# Patient Record
Sex: FEMALE | Race: WHITE | NOT HISPANIC OR LATINO | Employment: OTHER | URBAN - METROPOLITAN AREA
[De-identification: names, ages, dates, MRNs, and addresses within clinical notes are randomized per-mention and may not be internally consistent; named-entity substitution may affect disease eponyms.]

---

## 2019-03-29 ENCOUNTER — APPOINTMENT (OUTPATIENT)
Dept: RADIOLOGY | Facility: CLINIC | Age: 64
End: 2019-03-29
Payer: MEDICARE

## 2019-03-29 ENCOUNTER — OFFICE VISIT (OUTPATIENT)
Dept: URGENT CARE | Facility: CLINIC | Age: 64
End: 2019-03-29
Payer: MEDICARE

## 2019-03-29 VITALS
RESPIRATION RATE: 20 BRPM | HEART RATE: 72 BPM | DIASTOLIC BLOOD PRESSURE: 76 MMHG | OXYGEN SATURATION: 97 % | TEMPERATURE: 98.2 F | SYSTOLIC BLOOD PRESSURE: 128 MMHG

## 2019-03-29 DIAGNOSIS — M72.2 PLANTAR FASCIITIS, RIGHT: Primary | ICD-10-CM

## 2019-03-29 DIAGNOSIS — M79.671 RIGHT FOOT PAIN: ICD-10-CM

## 2019-03-29 PROCEDURE — 99203 OFFICE O/P NEW LOW 30 MIN: CPT | Performed by: NURSE PRACTITIONER

## 2019-03-29 PROCEDURE — G0463 HOSPITAL OUTPT CLINIC VISIT: HCPCS | Performed by: NURSE PRACTITIONER

## 2019-03-29 PROCEDURE — 73630 X-RAY EXAM OF FOOT: CPT

## 2019-03-29 RX ORDER — NAPROXEN 500 MG/1
250 TABLET ORAL 2 TIMES DAILY WITH MEALS
Qty: 30 TABLET | Refills: 0 | Status: SHIPPED | OUTPATIENT
Start: 2019-03-29 | End: 2019-12-03

## 2019-03-29 RX ORDER — PRAVASTATIN SODIUM 10 MG
10 TABLET ORAL DAILY
COMMUNITY
End: 2019-12-03

## 2019-03-29 RX ORDER — LEVOTHYROXINE SODIUM 0.07 MG/1
75 TABLET ORAL DAILY
COMMUNITY
End: 2019-12-03

## 2019-03-29 RX ORDER — IRBESARTAN AND HYDROCHLOROTHIAZIDE 150; 12.5 MG/1; MG/1
1 TABLET, FILM COATED ORAL DAILY
COMMUNITY
End: 2019-12-03

## 2019-03-29 NOTE — PROGRESS NOTES
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 since she has access to care there  Advised her that she will need to follow up with Podiatry for orthopedic specialist if symptoms persist     Follow up with PCP in 3-5 days  Proceed to  ER if symptoms worsen  Chief Complaint     Chief Complaint   Patient presents with    Foot Pain     right foot pain on bottom of foot for 2 days after walking         History of Present Illness       68-year-old female with a chief complaint of pain on the plantar aspect of the right foot  Four days ago she was walking [place] and felt a hole on the bottom of her right foot it has radiated to the right heel and ankle at times  It seemed to be improving until this morning  She took a step and felt a pull on the bottom of the foot again  Current pain level is a 5/10 on a 0-10 scale  She denies numbness tingling or weakness in her right lower extremity  Review of Systems   Review of Systems   Constitutional: Negative  HENT: Negative  Eyes: Negative  Respiratory: Negative  Cardiovascular: Negative  Gastrointestinal: Negative  Endocrine: Negative  Genitourinary: Negative  Musculoskeletal: Positive for arthralgias (Right foot pain)  Neurological: Negative  Psychiatric/Behavioral: Negative            Current Medications       Current Outpatient Medications:     irbesartan-hydrochlorothiazide (AVALIDE) 150-12 5 MG per tablet, Take 1 tablet by mouth daily, Disp: , Rfl:     levothyroxine 75 mcg tablet, Take 75 mcg by mouth daily, Disp: , Rfl:     pravastatin (PRAVACHOL) 10 mg tablet, Take 10 mg by mouth daily, Disp: , Rfl:     naproxen (NAPROSYN) 500 mg tablet, Take 0 5 tablets (250 mg total) by mouth 2 (two) times a day with meals for 15 days, Disp: 30 tablet, Rfl: 0    Current Allergies     Allergies as of 03/29/2019 - Reviewed 03/29/2019   Allergen Reaction Noted    Penicillins  03/29/2019            The following portions of the patient's history were reviewed and updated as appropriate: allergies, current medications, past family history, past medical history, past social history, past surgical history and problem list      Past Medical History:   Diagnosis Date    Disease of thyroid gland        History reviewed  No pertinent surgical history  History reviewed  No pertinent family history  Medications have been verified  Objective   /76   Pulse 72   Temp 98 2 °F (36 8 °C) (Tympanic)   Resp 20   SpO2 97%        Physical Exam     Physical Exam   Constitutional: She appears well-developed and well-nourished  No distress  HENT:   Head: Normocephalic and atraumatic  Right Ear: External ear normal    Left Ear: External ear normal    Mouth/Throat: Oropharynx is clear and moist    Eyes: Pupils are equal, round, and reactive to light  Conjunctivae and EOM are normal    Neck: Normal range of motion  Neck supple  Cardiovascular: Normal rate, regular rhythm and normal heart sounds  Pulmonary/Chest: Effort normal and breath sounds normal  No stridor  No respiratory distress  She has no wheezes  Abdominal: Soft  Bowel sounds are normal    Musculoskeletal:        Feet:    Lymphadenopathy:     She has no cervical adenopathy  Skin: Skin is warm and dry  She is not diaphoretic  Psychiatric: She has a normal mood and affect   Her behavior is normal  Judgment and thought content normal  Nursing note and vitals reviewed

## 2019-03-29 NOTE — PATIENT INSTRUCTIONS
Plantar Fasciitis   WHAT YOU NEED TO KNOW:   Rest, ice, and foot supports can help your plantar fascia heal  You may need medicines to decrease swelling and pain  A physical therapist can teach you exercises to help improve movement and strength, and to decrease pain  DISCHARGE INSTRUCTIONS:   Contact your healthcare provider or podiatrist if:   · Your pain and swelling increase  · You develop knee, hip, or back pain  · You have questions or concerns about your condition or care  Medicines: You may  need any of the following:  · Acetaminophen  decreases pain and fever  It is available without a doctor's order  Ask how much to give your child and how often to give it  Follow directions  Read the labels of all other medicines your child uses to see if they also contain acetaminophen, or ask your child's doctor or pharmacist  Acetaminophen can cause liver damage if not taken correctly  · NSAIDs , such as ibuprofen, help decrease swelling, pain, and fever  NSAIDs can cause stomach bleeding or kidney problems in certain people  If you take blood thinner medicine, always ask your healthcare provider if NSAIDs are safe for you  Always read the medicine label and follow directions  · Take your medicine as directed  Contact your healthcare provider if you think your medicine is not helping or if you have side effects  Tell him of her if you are allergic to any medicine  Keep a list of the medicines, vitamins, and herbs you take  Include the amounts, and when and why you take them  Bring the list or the pill bottles to follow-up visits  Carry your medicine list with you in case of an emergency  Self-care:   · Wear your splint or shoe inserts as directed  You may need to wear a splint at night to keep your foot stretched while you sleep  This will help prevent sharp pain first thing in the morning  Shoe inserts will help decrease stress on your plantar fascia when you walk or exercise       · Rest as directed  Rest as much as possible to decrease swelling and prevent more damage  Ask your healthcare provider when you can return to your normal activities  · Apply ice on your plantar fascia  Ice helps prevent tissue damage and decreases swelling and pain  Fill a water bottle with water and freeze it  Wrap a towel around the bottle or cover it with a pillow case  Roll the water bottle under your foot for 10 minutes in the morning and after work  · Massage your plantar fascia as directed  This may help decrease swelling and pain  Roll a golf ball under your foot for 10 minutes  Repeat 3 times each day  · Go to physical therapy as directed  A physical therapist teaches you exercises to help improve movement and strength, and to decrease pain  Prevent plantar fasciitis:   · Maintain a healthy weight  This will help decrease stress on your feet  Ask your healthcare provider how much you should weigh  Ask him to help you create a weight loss plan if you are overweight  · Do low-impact exercises  Low-impact exercises decrease stress on your plantar fascia  Examples include swimming or bicycling  · Start new activities slowly  Increase the intensity and time gradually  · Wear shoes that fit well and support your arch  Replace your shoes before the padding or shock absorption wears out  Do not walk or  bare feet or sandals for long periods of time  · Stretch before you exercise  Ask your healthcare provider how to stretch your plantar fascia and calf muscles  Follow up with your healthcare provider or podiatrist as directed:  Write down your questions so you remember to ask them during your visits  © 2017 [employer] Information is for End User's use only and may not be sold, redistributed or otherwise used for commercial purposes  All illustrations and images included in CareNotes® are the copyrighted property of [employer]  or [employer]    The above information is an  only  It is not intended as medical advice for individual conditions or treatments  Talk to your doctor, nurse or pharmacist before following any medical regimen to see if it is safe and effective for you

## 2019-12-03 ENCOUNTER — OFFICE VISIT (OUTPATIENT)
Dept: INTERNAL MEDICINE CLINIC | Facility: CLINIC | Age: 64
End: 2019-12-03
Payer: MEDICARE

## 2019-12-03 ENCOUNTER — APPOINTMENT (OUTPATIENT)
Dept: LAB | Facility: CLINIC | Age: 64
End: 2019-12-03
Payer: MEDICARE

## 2019-12-03 VITALS
HEART RATE: 78 BPM | SYSTOLIC BLOOD PRESSURE: 126 MMHG | RESPIRATION RATE: 14 BRPM | TEMPERATURE: 98.2 F | DIASTOLIC BLOOD PRESSURE: 74 MMHG | HEIGHT: 64 IN | WEIGHT: 237 LBS | BODY MASS INDEX: 40.46 KG/M2

## 2019-12-03 DIAGNOSIS — Z13.29 SCREENING FOR HYPOTHYROIDISM: ICD-10-CM

## 2019-12-03 DIAGNOSIS — Z13.1 SCREENING FOR DIABETES MELLITUS: ICD-10-CM

## 2019-12-03 DIAGNOSIS — Z78.0 MENOPAUSE: ICD-10-CM

## 2019-12-03 DIAGNOSIS — E78.5 HYPERLIPIDEMIA, UNSPECIFIED HYPERLIPIDEMIA TYPE: ICD-10-CM

## 2019-12-03 DIAGNOSIS — Z12.11 SCREENING FOR COLON CANCER: ICD-10-CM

## 2019-12-03 DIAGNOSIS — Z13.220 SCREENING FOR HYPERLIPIDEMIA: ICD-10-CM

## 2019-12-03 DIAGNOSIS — Z13.0 SCREENING FOR DEFICIENCY ANEMIA: ICD-10-CM

## 2019-12-03 DIAGNOSIS — Z12.39 SCREENING FOR BREAST CANCER: ICD-10-CM

## 2019-12-03 DIAGNOSIS — E03.9 ACQUIRED HYPOTHYROIDISM: ICD-10-CM

## 2019-12-03 DIAGNOSIS — Z13.21 ENCOUNTER FOR VITAMIN DEFICIENCY SCREENING: ICD-10-CM

## 2019-12-03 DIAGNOSIS — I10 ESSENTIAL HYPERTENSION: Primary | ICD-10-CM

## 2019-12-03 LAB
25(OH)D3 SERPL-MCNC: 19.6 NG/ML (ref 30–100)
ALBUMIN SERPL BCP-MCNC: 4.2 G/DL (ref 3.5–5)
ALP SERPL-CCNC: 81 U/L (ref 46–116)
ALT SERPL W P-5'-P-CCNC: 24 U/L (ref 12–78)
ANION GAP SERPL CALCULATED.3IONS-SCNC: 5 MMOL/L (ref 4–13)
AST SERPL W P-5'-P-CCNC: 10 U/L (ref 5–45)
BASOPHILS # BLD AUTO: 0.04 THOUSANDS/ΜL (ref 0–0.1)
BASOPHILS NFR BLD AUTO: 1 % (ref 0–1)
BILIRUB SERPL-MCNC: 0.58 MG/DL (ref 0.2–1)
BUN SERPL-MCNC: 12 MG/DL (ref 5–25)
CALCIUM SERPL-MCNC: 9.4 MG/DL (ref 8.3–10.1)
CHLORIDE SERPL-SCNC: 107 MMOL/L (ref 100–108)
CHOLEST SERPL-MCNC: 202 MG/DL (ref 50–200)
CO2 SERPL-SCNC: 29 MMOL/L (ref 21–32)
CREAT SERPL-MCNC: 0.61 MG/DL (ref 0.6–1.3)
EOSINOPHIL # BLD AUTO: 0.12 THOUSAND/ΜL (ref 0–0.61)
EOSINOPHIL NFR BLD AUTO: 2 % (ref 0–6)
ERYTHROCYTE [DISTWIDTH] IN BLOOD BY AUTOMATED COUNT: 13 % (ref 11.6–15.1)
GFR SERPL CREATININE-BSD FRML MDRD: 95 ML/MIN/1.73SQ M
GLUCOSE P FAST SERPL-MCNC: 112 MG/DL (ref 65–99)
HCT VFR BLD AUTO: 44.1 % (ref 34.8–46.1)
HDLC SERPL-MCNC: 56 MG/DL
HGB BLD-MCNC: 14 G/DL (ref 11.5–15.4)
IMM GRANULOCYTES # BLD AUTO: 0.01 THOUSAND/UL (ref 0–0.2)
IMM GRANULOCYTES NFR BLD AUTO: 0 % (ref 0–2)
LDLC SERPL CALC-MCNC: 109 MG/DL (ref 0–100)
LYMPHOCYTES # BLD AUTO: 1.98 THOUSANDS/ΜL (ref 0.6–4.47)
LYMPHOCYTES NFR BLD AUTO: 30 % (ref 14–44)
MCH RBC QN AUTO: 30.8 PG (ref 26.8–34.3)
MCHC RBC AUTO-ENTMCNC: 31.7 G/DL (ref 31.4–37.4)
MCV RBC AUTO: 97 FL (ref 82–98)
MONOCYTES # BLD AUTO: 0.64 THOUSAND/ΜL (ref 0.17–1.22)
MONOCYTES NFR BLD AUTO: 10 % (ref 4–12)
NEUTROPHILS # BLD AUTO: 3.93 THOUSANDS/ΜL (ref 1.85–7.62)
NEUTS SEG NFR BLD AUTO: 57 % (ref 43–75)
NRBC BLD AUTO-RTO: 0 /100 WBCS
PLATELET # BLD AUTO: 253 THOUSANDS/UL (ref 149–390)
PMV BLD AUTO: 9.7 FL (ref 8.9–12.7)
POTASSIUM SERPL-SCNC: 4 MMOL/L (ref 3.5–5.3)
PROT SERPL-MCNC: 7.1 G/DL (ref 6.4–8.2)
RBC # BLD AUTO: 4.54 MILLION/UL (ref 3.81–5.12)
SODIUM SERPL-SCNC: 141 MMOL/L (ref 136–145)
TRIGL SERPL-MCNC: 184 MG/DL
TSH SERPL DL<=0.05 MIU/L-ACNC: 2.88 UIU/ML (ref 0.36–3.74)
WBC # BLD AUTO: 6.72 THOUSAND/UL (ref 4.31–10.16)

## 2019-12-03 PROCEDURE — 99203 OFFICE O/P NEW LOW 30 MIN: CPT | Performed by: PHYSICIAN ASSISTANT

## 2019-12-03 PROCEDURE — 82306 VITAMIN D 25 HYDROXY: CPT

## 2019-12-03 PROCEDURE — 80053 COMPREHEN METABOLIC PANEL: CPT

## 2019-12-03 PROCEDURE — 84443 ASSAY THYROID STIM HORMONE: CPT

## 2019-12-03 PROCEDURE — 36415 COLL VENOUS BLD VENIPUNCTURE: CPT

## 2019-12-03 PROCEDURE — 80061 LIPID PANEL: CPT

## 2019-12-03 PROCEDURE — 85025 COMPLETE CBC W/AUTO DIFF WBC: CPT

## 2019-12-03 RX ORDER — MULTIVITAMIN
1 TABLET ORAL DAILY
COMMUNITY

## 2019-12-03 RX ORDER — LEVOTHYROXINE SODIUM 88 UG/1
TABLET ORAL
Refills: 0 | COMMUNITY
Start: 2019-09-01 | End: 2019-12-03 | Stop reason: SDUPTHER

## 2019-12-03 RX ORDER — PRAVASTATIN SODIUM 20 MG
20 TABLET ORAL DAILY
Qty: 30 TABLET | Refills: 2 | Status: SHIPPED | OUTPATIENT
Start: 2019-12-03 | End: 2019-12-30 | Stop reason: SDUPTHER

## 2019-12-03 RX ORDER — LEVOTHYROXINE SODIUM 88 UG/1
88 TABLET ORAL DAILY
Qty: 30 TABLET | Refills: 5 | Status: SHIPPED | OUTPATIENT
Start: 2019-12-03 | End: 2020-01-03 | Stop reason: SDUPTHER

## 2019-12-03 RX ORDER — PRAVASTATIN SODIUM 20 MG
TABLET ORAL
Refills: 0 | COMMUNITY
Start: 2019-09-01 | End: 2019-12-03 | Stop reason: SDUPTHER

## 2019-12-03 RX ORDER — LORATADINE 10 MG/1
10 TABLET ORAL DAILY
COMMUNITY
End: 2021-11-23

## 2019-12-03 RX ORDER — LOSARTAN POTASSIUM AND HYDROCHLOROTHIAZIDE 12.5; 1 MG/1; MG/1
1 TABLET ORAL DAILY
Qty: 30 TABLET | Refills: 5 | Status: SHIPPED | OUTPATIENT
Start: 2019-12-03 | End: 2020-01-03 | Stop reason: SDUPTHER

## 2019-12-03 RX ORDER — LOSARTAN POTASSIUM AND HYDROCHLOROTHIAZIDE 12.5; 1 MG/1; MG/1
TABLET ORAL
Refills: 0 | COMMUNITY
Start: 2019-10-05 | End: 2019-12-03 | Stop reason: SDUPTHER

## 2019-12-03 NOTE — PROGRESS NOTES
Assessment/Plan:  Problem List Items Addressed This Visit        Endocrine    Acquired hypothyroidism    Relevant Medications    levothyroxine 88 mcg tablet       Cardiovascular and Mediastinum    Essential hypertension - Primary     Pts symptoms are stable with current regime  No changes at present  Update labs and all other needed health maintenance studies  Relevant Medications    losartan-hydrochlorothiazide (HYZAAR) 100-12 5 MG per tablet       Other    Hyperlipidemia    Relevant Medications    pravastatin (PRAVACHOL) 20 mg tablet      Other Visit Diagnoses     Screening for hyperlipidemia        Relevant Orders    Lipid Panel with Direct LDL reflex    Screening for deficiency anemia        Relevant Orders    CBC and differential    Screening for diabetes mellitus        Relevant Orders    Comprehensive metabolic panel    Screening for hypothyroidism        Relevant Orders    TSH, 3rd generation with Free T4 reflex    Encounter for vitamin deficiency screening        Relevant Orders    Vitamin D 25 hydroxy    Screening for breast cancer        Relevant Orders    Mammo screening bilateral w 3d & cad    Menopause        Relevant Orders    DXA bone density spine hip and pelvis    Screening for colon cancer               Diagnoses and all orders for this visit:    Essential hypertension  -     losartan-hydrochlorothiazide (HYZAAR) 100-12 5 MG per tablet; Take 1 tablet by mouth daily    Screening for hyperlipidemia  -     Lipid Panel with Direct LDL reflex; Future    Screening for deficiency anemia  -     CBC and differential; Future    Screening for diabetes mellitus  -     Comprehensive metabolic panel; Future    Screening for hypothyroidism  -     TSH, 3rd generation with Free T4 reflex; Future    Encounter for vitamin deficiency screening  -     Vitamin D 25 hydroxy;  Future    Screening for breast cancer  -     Mammo screening bilateral w 3d & cad; Future    Menopause  -     DXA bone density spine hip and pelvis; Future    Screening for colon cancer    Hyperlipidemia, unspecified hyperlipidemia type  -     pravastatin (PRAVACHOL) 20 mg tablet; Take 1 tablet (20 mg total) by mouth daily    Acquired hypothyroidism  -     levothyroxine 88 mcg tablet; Take 1 tablet (88 mcg total) by mouth daily    Other orders  -     Discontinue: losartan-hydrochlorothiazide (HYZAAR) 100-12 5 MG per tablet  -     Discontinue: pravastatin (PRAVACHOL) 20 mg tablet  -     Discontinue: levothyroxine 88 mcg tablet  -     loratadine (CLARITIN) 10 mg tablet; Take 10 mg by mouth daily  -     Multiple Vitamin (MULTIVITAMIN) tablet; Take 1 tablet by mouth daily        Essential hypertension  Pts symptoms are stable with current regime  No changes at present  Update labs and all other needed health maintenance studies  Subjective:      Patient ID: Anayeli Mathews is a 75 y o  female  Pt presents to [facility] care as a transfer from [facility] significant for hypothyroidism, HTN, hyperlipidemia and seasonal allergies  PSurgHx includes tubal ligation and melanoma removal  Allergy to PCN  Medications noted in the chart  She works as a Physical Therapy Asst  She is   Both parents are , mother had thyroid disease and father had skin CA  She is due for dexa scan, mammogram, CRC screening  Depression screen is negative  She defers Shingrix or pneumonia vaccines at this time  BP is well controlled with current regime  The following portions of the patient's history were reviewed and updated as appropriate:   She has a past medical history of Allergic, Disease of thyroid gland, and Hypertension  ,  does not have any pertinent problems on file  ,   has no past surgical history on file  ,  family history includes Cancer in her father; Thyroid disease in her mother  ,   reports that she has quit smoking  She has never used smokeless tobacco  She reports that she drinks alcohol   She reports that she does not use drugs ,  is allergic to penicillins     Current Outpatient Medications   Medication Sig Dispense Refill    levothyroxine 88 mcg tablet Take 1 tablet (88 mcg total) by mouth daily 30 tablet 5    loratadine (CLARITIN) 10 mg tablet Take 10 mg by mouth daily      losartan-hydrochlorothiazide (HYZAAR) 100-12 5 MG per tablet Take 1 tablet by mouth daily 30 tablet 5    Multiple Vitamin (MULTIVITAMIN) tablet Take 1 tablet by mouth daily      pravastatin (PRAVACHOL) 20 mg tablet Take 1 tablet (20 mg total) by mouth daily 30 tablet 2     No current facility-administered medications for this visit  Review of Systems   Constitutional: Negative for chills and fever  HENT: Negative for congestion, ear pain, hearing loss, postnasal drip, rhinorrhea, sinus pressure, sinus pain, sore throat and trouble swallowing  Eyes: Negative for pain and visual disturbance  Respiratory: Negative for cough, chest tightness, shortness of breath and wheezing  Cardiovascular: Negative  Negative for chest pain, palpitations and leg swelling  Gastrointestinal: Negative for abdominal pain, blood in stool, constipation, diarrhea, nausea and vomiting  Endocrine: Negative for cold intolerance, heat intolerance, polydipsia, polyphagia and polyuria  Genitourinary: Negative for difficulty urinating, dysuria, flank pain and urgency  Musculoskeletal: Negative for arthralgias, back pain, gait problem and myalgias  Skin: Negative for rash  Allergic/Immunologic: Negative  Neurological: Negative for dizziness, weakness, light-headedness and headaches  Hematological: Negative  Psychiatric/Behavioral: Negative for behavioral problems, dysphoric mood and sleep disturbance  The patient is not nervous/anxious            PHQ-9 Depression Screening    PHQ-9:    Frequency of the following problems over the past two weeks:       Little interest or pleasure in doing things:  0 - not at all  Feeling down, depressed, or hopeless:  0 - not at all  PHQ-2 Score:  0          Objective:  Vitals:    12/03/19 0954   BP: 126/74   Pulse: 78   Resp: 14   Temp: 98 2 °F (36 8 °C)   TempSrc: Tympanic   Weight: 108 kg (237 lb)   Height: 5' 4" (1 626 m)     Body mass index is 40 68 kg/m²  Physical Exam   Constitutional: She is oriented to person, place, and time  She appears well-developed and well-nourished  No distress  HENT:   Head: Normocephalic and atraumatic  Right Ear: External ear normal    Left Ear: External ear normal    Nose: Nose normal    Mouth/Throat: Oropharynx is clear and moist  No oropharyngeal exudate  Eyes: Pupils are equal, round, and reactive to light  Conjunctivae and EOM are normal  Right eye exhibits no discharge  Left eye exhibits no discharge  No scleral icterus  Neck: Normal range of motion  Neck supple  No thyromegaly present  Cardiovascular: Normal rate, regular rhythm and normal heart sounds  Exam reveals no gallop and no friction rub  No murmur heard  Pulmonary/Chest: Effort normal and breath sounds normal  No respiratory distress  She has no wheezes  She has no rales  Abdominal: Soft  Bowel sounds are normal  She exhibits no distension  There is no tenderness  Musculoskeletal: Normal range of motion  She exhibits no edema, tenderness or deformity  Neurological: She is alert and oriented to person, place, and time  No cranial nerve deficit  Skin: Skin is warm and dry  She is not diaphoretic  Psychiatric: She has a normal mood and affect  Her behavior is normal  Judgment and thought content normal        BMI Counseling: Body mass index is 40 68 kg/m²  The BMI is above normal  Nutrition recommendations include decreasing portion sizes, consuming healthier snacks and limiting drinks that contain sugar

## 2019-12-03 NOTE — ASSESSMENT & PLAN NOTE
Pts symptoms are stable with current regime  No changes at present  Update labs and all other needed health maintenance studies

## 2019-12-06 RX ORDER — CHOLECALCIFEROL (VITAMIN D3) 125 MCG
2000 CAPSULE ORAL DAILY
COMMUNITY

## 2019-12-30 DIAGNOSIS — E78.5 HYPERLIPIDEMIA, UNSPECIFIED HYPERLIPIDEMIA TYPE: ICD-10-CM

## 2019-12-31 RX ORDER — PRAVASTATIN SODIUM 20 MG
20 TABLET ORAL DAILY
Qty: 30 TABLET | Refills: 5 | Status: SHIPPED | OUTPATIENT
Start: 2019-12-31 | End: 2020-01-03 | Stop reason: SDUPTHER

## 2020-01-02 ENCOUNTER — DOCUMENTATION (OUTPATIENT)
Dept: INTERNAL MEDICINE CLINIC | Facility: CLINIC | Age: 65
End: 2020-01-02

## 2020-01-03 DIAGNOSIS — E78.5 HYPERLIPIDEMIA, UNSPECIFIED HYPERLIPIDEMIA TYPE: ICD-10-CM

## 2020-01-03 DIAGNOSIS — I10 ESSENTIAL HYPERTENSION: ICD-10-CM

## 2020-01-03 DIAGNOSIS — E03.9 ACQUIRED HYPOTHYROIDISM: ICD-10-CM

## 2020-01-03 RX ORDER — LOSARTAN POTASSIUM AND HYDROCHLOROTHIAZIDE 12.5; 1 MG/1; MG/1
1 TABLET ORAL DAILY
Qty: 90 TABLET | Refills: 1 | Status: SHIPPED | OUTPATIENT
Start: 2020-01-03 | End: 2020-01-04 | Stop reason: SDUPTHER

## 2020-01-03 RX ORDER — LEVOTHYROXINE SODIUM 88 UG/1
88 TABLET ORAL DAILY
Qty: 90 TABLET | Refills: 1 | Status: SHIPPED | OUTPATIENT
Start: 2020-01-03 | End: 2020-04-25 | Stop reason: SDUPTHER

## 2020-01-03 RX ORDER — PRAVASTATIN SODIUM 20 MG
20 TABLET ORAL DAILY
Qty: 90 TABLET | Refills: 1 | Status: SHIPPED | OUTPATIENT
Start: 2020-01-03 | End: 2020-01-30 | Stop reason: SDUPTHER

## 2020-01-04 DIAGNOSIS — I10 ESSENTIAL HYPERTENSION: ICD-10-CM

## 2020-01-06 DIAGNOSIS — I10 ESSENTIAL HYPERTENSION: Primary | ICD-10-CM

## 2020-01-06 RX ORDER — LOSARTAN POTASSIUM AND HYDROCHLOROTHIAZIDE 12.5; 1 MG/1; MG/1
1 TABLET ORAL DAILY
Qty: 90 TABLET | Refills: 1 | Status: SHIPPED | OUTPATIENT
Start: 2020-01-06 | End: 2020-01-06

## 2020-01-06 RX ORDER — LOSARTAN POTASSIUM 100 MG/1
100 TABLET ORAL DAILY
Qty: 30 TABLET | Refills: 5 | Status: SHIPPED | OUTPATIENT
Start: 2020-01-06 | End: 2020-01-30 | Stop reason: SDUPTHER

## 2020-01-06 RX ORDER — HYDROCHLOROTHIAZIDE 12.5 MG/1
12.5 TABLET ORAL DAILY
Qty: 30 TABLET | Refills: 5 | Status: SHIPPED | OUTPATIENT
Start: 2020-01-06 | End: 2020-01-30 | Stop reason: SDUPTHER

## 2020-01-30 DIAGNOSIS — E78.5 HYPERLIPIDEMIA, UNSPECIFIED HYPERLIPIDEMIA TYPE: ICD-10-CM

## 2020-01-30 DIAGNOSIS — I10 ESSENTIAL HYPERTENSION: ICD-10-CM

## 2020-01-30 RX ORDER — LOSARTAN POTASSIUM 100 MG/1
100 TABLET ORAL DAILY
Qty: 30 TABLET | Refills: 0 | Status: SHIPPED | OUTPATIENT
Start: 2020-01-30 | End: 2020-02-25 | Stop reason: SDUPTHER

## 2020-01-30 RX ORDER — HYDROCHLOROTHIAZIDE 12.5 MG/1
12.5 TABLET ORAL DAILY
Qty: 30 TABLET | Refills: 0 | Status: SHIPPED | OUTPATIENT
Start: 2020-01-30 | End: 2020-02-25 | Stop reason: SDUPTHER

## 2020-01-30 RX ORDER — PRAVASTATIN SODIUM 20 MG
20 TABLET ORAL DAILY
Qty: 90 TABLET | Refills: 0 | Status: SHIPPED | OUTPATIENT
Start: 2020-01-30 | End: 2020-04-25 | Stop reason: SDUPTHER

## 2020-02-25 DIAGNOSIS — I10 ESSENTIAL HYPERTENSION: ICD-10-CM

## 2020-02-25 RX ORDER — HYDROCHLOROTHIAZIDE 12.5 MG/1
12.5 TABLET ORAL DAILY
Qty: 90 TABLET | Refills: 1 | Status: SHIPPED | OUTPATIENT
Start: 2020-02-25 | End: 2020-05-25 | Stop reason: SDUPTHER

## 2020-02-25 RX ORDER — LOSARTAN POTASSIUM 100 MG/1
100 TABLET ORAL DAILY
Qty: 90 TABLET | Refills: 1 | Status: SHIPPED | OUTPATIENT
Start: 2020-02-25 | End: 2020-05-25 | Stop reason: SDUPTHER

## 2020-04-25 DIAGNOSIS — E78.5 HYPERLIPIDEMIA, UNSPECIFIED HYPERLIPIDEMIA TYPE: ICD-10-CM

## 2020-04-25 DIAGNOSIS — E03.9 ACQUIRED HYPOTHYROIDISM: ICD-10-CM

## 2020-04-27 RX ORDER — LEVOTHYROXINE SODIUM 88 UG/1
88 TABLET ORAL DAILY
Qty: 90 TABLET | Refills: 0 | Status: SHIPPED | OUTPATIENT
Start: 2020-04-27 | End: 2020-07-29 | Stop reason: SDUPTHER

## 2020-04-27 RX ORDER — LEVOTHYROXINE SODIUM 88 UG/1
88 TABLET ORAL DAILY
Qty: 90 TABLET | Refills: 1 | Status: SHIPPED | OUTPATIENT
Start: 2020-04-27 | End: 2020-10-30 | Stop reason: SDUPTHER

## 2020-04-27 RX ORDER — PRAVASTATIN SODIUM 20 MG
20 TABLET ORAL DAILY
Qty: 90 TABLET | Refills: 1 | Status: SHIPPED | OUTPATIENT
Start: 2020-04-27 | End: 2020-10-30 | Stop reason: SDUPTHER

## 2020-04-27 RX ORDER — PRAVASTATIN SODIUM 20 MG
20 TABLET ORAL DAILY
Qty: 90 TABLET | Refills: 0 | Status: SHIPPED | OUTPATIENT
Start: 2020-04-27 | End: 2020-07-29 | Stop reason: SDUPTHER

## 2020-05-25 DIAGNOSIS — I10 ESSENTIAL HYPERTENSION: ICD-10-CM

## 2020-05-26 RX ORDER — LOSARTAN POTASSIUM 100 MG/1
100 TABLET ORAL DAILY
Qty: 90 TABLET | Refills: 1 | Status: SHIPPED | OUTPATIENT
Start: 2020-05-26 | End: 2020-08-27 | Stop reason: SDUPTHER

## 2020-05-26 RX ORDER — HYDROCHLOROTHIAZIDE 12.5 MG/1
12.5 TABLET ORAL DAILY
Qty: 90 TABLET | Refills: 1 | Status: SHIPPED | OUTPATIENT
Start: 2020-05-26 | End: 2020-08-27 | Stop reason: SDUPTHER

## 2020-07-29 DIAGNOSIS — E03.9 ACQUIRED HYPOTHYROIDISM: ICD-10-CM

## 2020-07-29 DIAGNOSIS — E78.5 HYPERLIPIDEMIA, UNSPECIFIED HYPERLIPIDEMIA TYPE: ICD-10-CM

## 2020-07-29 RX ORDER — LEVOTHYROXINE SODIUM 88 UG/1
88 TABLET ORAL DAILY
Qty: 90 TABLET | Refills: 1 | Status: SHIPPED | OUTPATIENT
Start: 2020-07-29 | End: 2020-10-30

## 2020-07-29 RX ORDER — PRAVASTATIN SODIUM 20 MG
20 TABLET ORAL DAILY
Qty: 90 TABLET | Refills: 1 | Status: SHIPPED | OUTPATIENT
Start: 2020-07-29 | End: 2020-10-30

## 2020-08-27 DIAGNOSIS — I10 ESSENTIAL HYPERTENSION: ICD-10-CM

## 2020-08-27 RX ORDER — HYDROCHLOROTHIAZIDE 12.5 MG/1
12.5 TABLET ORAL DAILY
Qty: 90 TABLET | Refills: 1 | Status: SHIPPED | OUTPATIENT
Start: 2020-08-27 | End: 2020-11-27 | Stop reason: SDUPTHER

## 2020-08-27 RX ORDER — LOSARTAN POTASSIUM 100 MG/1
100 TABLET ORAL DAILY
Qty: 90 TABLET | Refills: 1 | Status: SHIPPED | OUTPATIENT
Start: 2020-08-27 | End: 2020-11-27 | Stop reason: SDUPTHER

## 2020-10-29 DIAGNOSIS — E03.9 ACQUIRED HYPOTHYROIDISM: ICD-10-CM

## 2020-10-29 DIAGNOSIS — E78.5 HYPERLIPIDEMIA, UNSPECIFIED HYPERLIPIDEMIA TYPE: ICD-10-CM

## 2020-10-29 RX ORDER — LEVOTHYROXINE SODIUM 88 UG/1
88 TABLET ORAL DAILY
Qty: 90 TABLET | Refills: 0 | Status: CANCELLED | OUTPATIENT
Start: 2020-10-29

## 2020-10-29 RX ORDER — PRAVASTATIN SODIUM 20 MG
20 TABLET ORAL DAILY
Qty: 90 TABLET | Refills: 0 | Status: CANCELLED | OUTPATIENT
Start: 2020-10-29

## 2020-10-30 DIAGNOSIS — E78.5 HYPERLIPIDEMIA, UNSPECIFIED HYPERLIPIDEMIA TYPE: ICD-10-CM

## 2020-10-30 DIAGNOSIS — E03.9 ACQUIRED HYPOTHYROIDISM: ICD-10-CM

## 2020-10-30 RX ORDER — LEVOTHYROXINE SODIUM 88 UG/1
88 TABLET ORAL DAILY
Qty: 90 TABLET | Refills: 1 | Status: SHIPPED | OUTPATIENT
Start: 2020-10-30 | End: 2021-08-17

## 2020-10-30 RX ORDER — PRAVASTATIN SODIUM 20 MG
20 TABLET ORAL DAILY
Qty: 90 TABLET | Refills: 1 | Status: SHIPPED | OUTPATIENT
Start: 2020-10-30 | End: 2021-08-17

## 2020-11-02 RX ORDER — LEVOTHYROXINE SODIUM 88 UG/1
88 TABLET ORAL DAILY
Qty: 90 TABLET | Refills: 0 | Status: SHIPPED | OUTPATIENT
Start: 2020-11-02 | End: 2021-01-24 | Stop reason: SDUPTHER

## 2020-11-02 RX ORDER — PRAVASTATIN SODIUM 20 MG
20 TABLET ORAL DAILY
Qty: 90 TABLET | Refills: 0 | Status: SHIPPED | OUTPATIENT
Start: 2020-11-02 | End: 2021-01-24 | Stop reason: SDUPTHER

## 2020-11-27 DIAGNOSIS — I10 ESSENTIAL HYPERTENSION: ICD-10-CM

## 2020-11-30 RX ORDER — LOSARTAN POTASSIUM 100 MG/1
100 TABLET ORAL DAILY
Qty: 90 TABLET | Refills: 1 | Status: SHIPPED | OUTPATIENT
Start: 2020-11-30 | End: 2021-02-23 | Stop reason: SDUPTHER

## 2020-11-30 RX ORDER — HYDROCHLOROTHIAZIDE 12.5 MG/1
12.5 TABLET ORAL DAILY
Qty: 90 TABLET | Refills: 1 | Status: SHIPPED | OUTPATIENT
Start: 2020-11-30 | End: 2021-02-23 | Stop reason: SDUPTHER

## 2021-01-24 DIAGNOSIS — E78.5 HYPERLIPIDEMIA, UNSPECIFIED HYPERLIPIDEMIA TYPE: ICD-10-CM

## 2021-01-24 DIAGNOSIS — E03.9 ACQUIRED HYPOTHYROIDISM: ICD-10-CM

## 2021-01-25 DIAGNOSIS — Z23 ENCOUNTER FOR IMMUNIZATION: ICD-10-CM

## 2021-01-25 RX ORDER — PRAVASTATIN SODIUM 20 MG
20 TABLET ORAL DAILY
Qty: 90 TABLET | Refills: 1 | Status: SHIPPED | OUTPATIENT
Start: 2021-01-25 | End: 2021-10-22 | Stop reason: SDUPTHER

## 2021-01-25 RX ORDER — LEVOTHYROXINE SODIUM 88 UG/1
88 TABLET ORAL DAILY
Qty: 90 TABLET | Refills: 1 | Status: SHIPPED | OUTPATIENT
Start: 2021-01-25 | End: 2021-10-22 | Stop reason: SDUPTHER

## 2021-01-29 ENCOUNTER — IMMUNIZATIONS (OUTPATIENT)
Dept: FAMILY MEDICINE CLINIC | Facility: HOSPITAL | Age: 66
End: 2021-01-29

## 2021-01-29 DIAGNOSIS — Z23 ENCOUNTER FOR IMMUNIZATION: Primary | ICD-10-CM

## 2021-01-29 PROCEDURE — 0011A SARS-COV-2 / COVID-19 MRNA VACCINE (MODERNA) 100 MCG: CPT

## 2021-01-29 PROCEDURE — 91301 SARS-COV-2 / COVID-19 MRNA VACCINE (MODERNA) 100 MCG: CPT

## 2021-02-23 ENCOUNTER — NURSE TRIAGE (OUTPATIENT)
Dept: INTERNAL MEDICINE CLINIC | Facility: CLINIC | Age: 66
End: 2021-02-23

## 2021-02-23 DIAGNOSIS — I10 ESSENTIAL HYPERTENSION: ICD-10-CM

## 2021-02-23 RX ORDER — HYDROCHLOROTHIAZIDE 12.5 MG/1
12.5 TABLET ORAL DAILY
Qty: 90 TABLET | Refills: 1 | Status: SHIPPED | OUTPATIENT
Start: 2021-02-23 | End: 2021-08-17 | Stop reason: SDUPTHER

## 2021-02-23 RX ORDER — LOSARTAN POTASSIUM 100 MG/1
100 TABLET ORAL DAILY
Qty: 90 TABLET | Refills: 1 | Status: SHIPPED | OUTPATIENT
Start: 2021-02-23 | End: 2021-08-17 | Stop reason: SDUPTHER

## 2021-03-03 ENCOUNTER — IMMUNIZATIONS (OUTPATIENT)
Dept: FAMILY MEDICINE CLINIC | Facility: HOSPITAL | Age: 66
End: 2021-03-03

## 2021-03-03 DIAGNOSIS — Z23 ENCOUNTER FOR IMMUNIZATION: Primary | ICD-10-CM

## 2021-03-03 PROCEDURE — 91301 SARS-COV-2 / COVID-19 MRNA VACCINE (MODERNA) 100 MCG: CPT

## 2021-03-03 PROCEDURE — 0012A SARS-COV-2 / COVID-19 MRNA VACCINE (MODERNA) 100 MCG: CPT

## 2021-05-04 ENCOUNTER — TELEPHONE (OUTPATIENT)
Dept: INTERNAL MEDICINE CLINIC | Facility: CLINIC | Age: 66
End: 2021-05-04

## 2021-05-10 ENCOUNTER — TELEPHONE (OUTPATIENT)
Dept: INTERNAL MEDICINE CLINIC | Facility: CLINIC | Age: 66
End: 2021-05-10

## 2021-05-10 DIAGNOSIS — E78.5 HYPERLIPIDEMIA, UNSPECIFIED HYPERLIPIDEMIA TYPE: ICD-10-CM

## 2021-05-10 DIAGNOSIS — E55.9 VITAMIN D DEFICIENCY: ICD-10-CM

## 2021-05-10 DIAGNOSIS — E03.9 ACQUIRED HYPOTHYROIDISM: ICD-10-CM

## 2021-05-10 DIAGNOSIS — I10 ESSENTIAL HYPERTENSION: Primary | ICD-10-CM

## 2021-05-10 DIAGNOSIS — Z13.0 SCREENING FOR DEFICIENCY ANEMIA: ICD-10-CM

## 2021-08-10 ENCOUNTER — APPOINTMENT (OUTPATIENT)
Dept: LAB | Facility: CLINIC | Age: 66
End: 2021-08-10
Payer: MEDICARE

## 2021-08-10 DIAGNOSIS — Z13.0 SCREENING FOR DEFICIENCY ANEMIA: ICD-10-CM

## 2021-08-10 DIAGNOSIS — I10 ESSENTIAL HYPERTENSION: ICD-10-CM

## 2021-08-10 DIAGNOSIS — E55.9 VITAMIN D DEFICIENCY: ICD-10-CM

## 2021-08-10 DIAGNOSIS — E78.5 HYPERLIPIDEMIA, UNSPECIFIED HYPERLIPIDEMIA TYPE: ICD-10-CM

## 2021-08-10 DIAGNOSIS — E03.9 ACQUIRED HYPOTHYROIDISM: ICD-10-CM

## 2021-08-10 LAB
25(OH)D3 SERPL-MCNC: 36.6 NG/ML (ref 30–100)
ALBUMIN SERPL BCP-MCNC: 3.6 G/DL (ref 3.5–5)
ALP SERPL-CCNC: 80 U/L (ref 46–116)
ALT SERPL W P-5'-P-CCNC: 29 U/L (ref 12–78)
ANION GAP SERPL CALCULATED.3IONS-SCNC: 6 MMOL/L (ref 4–13)
AST SERPL W P-5'-P-CCNC: 14 U/L (ref 5–45)
BASOPHILS # BLD AUTO: 0.04 THOUSANDS/ΜL (ref 0–0.1)
BASOPHILS NFR BLD AUTO: 1 % (ref 0–1)
BILIRUB SERPL-MCNC: 0.75 MG/DL (ref 0.2–1)
BUN SERPL-MCNC: 10 MG/DL (ref 5–25)
CALCIUM SERPL-MCNC: 9.4 MG/DL (ref 8.3–10.1)
CHLORIDE SERPL-SCNC: 104 MMOL/L (ref 100–108)
CHOLEST SERPL-MCNC: 199 MG/DL (ref 50–200)
CO2 SERPL-SCNC: 28 MMOL/L (ref 21–32)
CREAT SERPL-MCNC: 0.62 MG/DL (ref 0.6–1.3)
EOSINOPHIL # BLD AUTO: 0.21 THOUSAND/ΜL (ref 0–0.61)
EOSINOPHIL NFR BLD AUTO: 3 % (ref 0–6)
ERYTHROCYTE [DISTWIDTH] IN BLOOD BY AUTOMATED COUNT: 13.6 % (ref 11.6–15.1)
GFR SERPL CREATININE-BSD FRML MDRD: 94 ML/MIN/1.73SQ M
GLUCOSE P FAST SERPL-MCNC: 137 MG/DL (ref 65–99)
HCT VFR BLD AUTO: 41.7 % (ref 34.8–46.1)
HDLC SERPL-MCNC: 51 MG/DL
HGB BLD-MCNC: 13.4 G/DL (ref 11.5–15.4)
IMM GRANULOCYTES # BLD AUTO: 0.01 THOUSAND/UL (ref 0–0.2)
IMM GRANULOCYTES NFR BLD AUTO: 0 % (ref 0–2)
LDLC SERPL CALC-MCNC: 105 MG/DL (ref 0–100)
LYMPHOCYTES # BLD AUTO: 2.15 THOUSANDS/ΜL (ref 0.6–4.47)
LYMPHOCYTES NFR BLD AUTO: 32 % (ref 14–44)
MCH RBC QN AUTO: 31.5 PG (ref 26.8–34.3)
MCHC RBC AUTO-ENTMCNC: 32.1 G/DL (ref 31.4–37.4)
MCV RBC AUTO: 98 FL (ref 82–98)
MONOCYTES # BLD AUTO: 0.65 THOUSAND/ΜL (ref 0.17–1.22)
MONOCYTES NFR BLD AUTO: 10 % (ref 4–12)
NEUTROPHILS # BLD AUTO: 3.71 THOUSANDS/ΜL (ref 1.85–7.62)
NEUTS SEG NFR BLD AUTO: 54 % (ref 43–75)
NONHDLC SERPL-MCNC: 148 MG/DL
NRBC BLD AUTO-RTO: 0 /100 WBCS
PLATELET # BLD AUTO: 259 THOUSANDS/UL (ref 149–390)
PMV BLD AUTO: 9.8 FL (ref 8.9–12.7)
POTASSIUM SERPL-SCNC: 4.2 MMOL/L (ref 3.5–5.3)
PROT SERPL-MCNC: 7 G/DL (ref 6.4–8.2)
RBC # BLD AUTO: 4.26 MILLION/UL (ref 3.81–5.12)
SODIUM SERPL-SCNC: 138 MMOL/L (ref 136–145)
TRIGL SERPL-MCNC: 216 MG/DL
TSH SERPL DL<=0.05 MIU/L-ACNC: 3 UIU/ML (ref 0.36–3.74)
WBC # BLD AUTO: 6.77 THOUSAND/UL (ref 4.31–10.16)

## 2021-08-10 PROCEDURE — 84443 ASSAY THYROID STIM HORMONE: CPT

## 2021-08-10 PROCEDURE — 80053 COMPREHEN METABOLIC PANEL: CPT

## 2021-08-10 PROCEDURE — 82306 VITAMIN D 25 HYDROXY: CPT

## 2021-08-10 PROCEDURE — 36415 COLL VENOUS BLD VENIPUNCTURE: CPT

## 2021-08-10 PROCEDURE — 85025 COMPLETE CBC W/AUTO DIFF WBC: CPT

## 2021-08-10 PROCEDURE — 80061 LIPID PANEL: CPT

## 2021-08-17 ENCOUNTER — APPOINTMENT (OUTPATIENT)
Dept: RADIOLOGY | Facility: CLINIC | Age: 66
End: 2021-08-17
Payer: MEDICARE

## 2021-08-17 ENCOUNTER — OFFICE VISIT (OUTPATIENT)
Dept: INTERNAL MEDICINE CLINIC | Facility: CLINIC | Age: 66
End: 2021-08-17
Payer: MEDICARE

## 2021-08-17 VITALS
TEMPERATURE: 97.5 F | SYSTOLIC BLOOD PRESSURE: 132 MMHG | WEIGHT: 243.38 LBS | BODY MASS INDEX: 41.55 KG/M2 | OXYGEN SATURATION: 97 % | HEIGHT: 64 IN | HEART RATE: 96 BPM | DIASTOLIC BLOOD PRESSURE: 80 MMHG

## 2021-08-17 DIAGNOSIS — I10 ESSENTIAL HYPERTENSION: ICD-10-CM

## 2021-08-17 DIAGNOSIS — M25.561 CHRONIC PAIN OF RIGHT KNEE: ICD-10-CM

## 2021-08-17 DIAGNOSIS — G89.29 CHRONIC PAIN OF RIGHT KNEE: ICD-10-CM

## 2021-08-17 DIAGNOSIS — M62.541 ATROPHY OF MUSCLE OF RIGHT HAND: ICD-10-CM

## 2021-08-17 DIAGNOSIS — Z12.11 COLON CANCER SCREENING: ICD-10-CM

## 2021-08-17 DIAGNOSIS — L23.7 ALLERGIC CONTACT DERMATITIS DUE TO PLANTS, EXCEPT FOOD: ICD-10-CM

## 2021-08-17 DIAGNOSIS — Z00.00 MEDICARE ANNUAL WELLNESS VISIT, SUBSEQUENT: ICD-10-CM

## 2021-08-17 DIAGNOSIS — Z12.31 ENCOUNTER FOR SCREENING MAMMOGRAM FOR MALIGNANT NEOPLASM OF BREAST: ICD-10-CM

## 2021-08-17 DIAGNOSIS — M25.531 RIGHT WRIST PAIN: ICD-10-CM

## 2021-08-17 DIAGNOSIS — R73.03 PRE-DIABETES: Primary | ICD-10-CM

## 2021-08-17 DIAGNOSIS — E66.01 OBESITY, MORBID (HCC): ICD-10-CM

## 2021-08-17 PROCEDURE — 1123F ACP DISCUSS/DSCN MKR DOCD: CPT | Performed by: PHYSICIAN ASSISTANT

## 2021-08-17 PROCEDURE — 73562 X-RAY EXAM OF KNEE 3: CPT

## 2021-08-17 PROCEDURE — 99214 OFFICE O/P EST MOD 30 MIN: CPT | Performed by: PHYSICIAN ASSISTANT

## 2021-08-17 PROCEDURE — G0438 PPPS, INITIAL VISIT: HCPCS | Performed by: PHYSICIAN ASSISTANT

## 2021-08-17 PROCEDURE — 73110 X-RAY EXAM OF WRIST: CPT

## 2021-08-17 RX ORDER — HYDROCHLOROTHIAZIDE 12.5 MG/1
12.5 TABLET ORAL DAILY
Qty: 90 TABLET | Refills: 1 | Status: SHIPPED | OUTPATIENT
Start: 2021-08-17 | End: 2022-02-18 | Stop reason: SDUPTHER

## 2021-08-17 RX ORDER — TRIAMCINOLONE ACETONIDE 1 MG/G
CREAM TOPICAL 2 TIMES DAILY
Qty: 30 G | Refills: 0 | Status: SHIPPED | OUTPATIENT
Start: 2021-08-17 | End: 2021-11-23

## 2021-08-17 RX ORDER — LOSARTAN POTASSIUM 100 MG/1
100 TABLET ORAL DAILY
Qty: 90 TABLET | Refills: 1 | Status: SHIPPED | OUTPATIENT
Start: 2021-08-17 | End: 2022-02-18 | Stop reason: SDUPTHER

## 2021-08-17 NOTE — ASSESSMENT & PLAN NOTE
Lifestyle modification discussed  Start low dose metformin  Directions for use and possible side effects discussed and patient verbalized understanding of these  Recheck 3 months

## 2021-08-17 NOTE — PROGRESS NOTES
Assessment and Plan:     Problem List Items Addressed This Visit     None      Visit Diagnoses     Encounter for screening mammogram for malignant neoplasm of breast    -  Primary        BMI Counseling: Body mass index is 41 78 kg/m²  The BMI is above normal  Nutrition recommendations include decreasing portion sizes, consuming healthier snacks and limiting drinks that contain sugar  Preventive health issues were discussed with patient, and age appropriate screening tests were ordered as noted in patient's After Visit Summary  Personalized health advice and appropriate referrals for health education or preventive services given if needed, as noted in patient's After Visit Summary  History of Present Illness:     Patient presents for Medicare Annual Wellness visit    Patient Care Team:  Ellis Loomis PA-C as PCP - General (Internal Medicine)     Problem List:     Patient Active Problem List   Diagnosis    Acquired hypothyroidism    Essential hypertension    Hyperlipidemia      Past Medical and Surgical History:     Past Medical History:   Diagnosis Date    Allergic     Disease of thyroid gland     Hypertension      No past surgical history on file     Family History:     Family History   Problem Relation Age of Onset    Thyroid disease Mother     Cancer Father       Social History:     Social History     Socioeconomic History    Marital status: /Civil Union     Spouse name: None    Number of children: None    Years of education: None    Highest education level: None   Occupational History    Occupation: PTA   Tobacco Use    Smoking status: Former Smoker    Smokeless tobacco: Never Used   Substance and Sexual Activity    Alcohol use: Yes     Comment: Socially    Drug use: Never    Sexual activity: Not Currently   Other Topics Concern    None   Social History Narrative    None     Social Determinants of Health     Financial Resource Strain:     Difficulty of Paying Living Expenses:    Food Insecurity:     Worried About Running Out of Food in the Last Year:     [address] in the Last Year:    Transportation Needs:     Lack of Transportation (Medical):  Lack of Transportation (Non-Medical):    Physical Activity:     Days of Exercise per Week:     Minutes of Exercise per Session:    Stress:     Feeling of Stress :    Social Connections:     Frequency of Communication with Friends and Family:     Frequency of Social Gatherings with Friends and Family:     Attends [employer] Services:     Active Member of Clubs or Organizations:     Attends Club or Organization Meetings:     Marital Status:    Intimate Partner Violence:     Fear of Current or Ex-Partner:     Emotionally Abused:     Physically Abused:     Sexually Abused:       Medications and Allergies:     Current Outpatient Medications   Medication Sig Dispense Refill    Cholecalciferol (VITAMIN D3) 50 MCG (2000 UT) TABS Take 2,000 Units by mouth daily      hydrochlorothiazide (HYDRODIURIL) 12 5 mg tablet Take 1 tablet (12 5 mg total) by mouth daily 90 tablet 1    levothyroxine 88 mcg tablet Take 1 tablet (88 mcg total) by mouth daily 90 tablet 1    losartan (COZAAR) 100 MG tablet Take 1 tablet (100 mg total) by mouth daily 90 tablet 1    Multiple Vitamin (MULTIVITAMIN) tablet Take 1 tablet by mouth daily      pravastatin (PRAVACHOL) 20 mg tablet Take 1 tablet (20 mg total) by mouth daily 90 tablet 1    levothyroxine 88 mcg tablet Take 1 tablet (88 mcg total) by mouth daily 90 tablet 1    loratadine (CLARITIN) 10 mg tablet Take 10 mg by mouth daily      pravastatin (PRAVACHOL) 20 mg tablet Take 1 tablet (20 mg total) by mouth daily 90 tablet 1     No current facility-administered medications for this visit       Allergies   Allergen Reactions    Penicillins       Immunizations:     Immunization History   Administered Date(s) Administered    INFLUENZA 10/16/2019, 09/10/2020    SARS-CoV-2 / COVID-19 mRNA IM (Moderna) 01/29/2021, 03/03/2021    Tdap 01/15/2018      Health Maintenance:         Topic Date Due    Hepatitis C Screening  Never done    Breast Cancer Screening: Mammogram  Never done    Colorectal Cancer Screening  Never done         Topic Date Due    Pneumococcal Vaccine: 65+ Years (1 of 1 - PPSV23) Never done    Influenza Vaccine (1) 09/01/2021      Medicare Health Risk Assessment:     /80   Pulse 96   Temp 97 5 °F (36 4 °C)   Ht 5' 4" (1 626 m)   Wt 110 kg (243 lb 6 oz)   SpO2 97%   BMI 41 78 kg/m²      John Hodge is here for her Subsequent Wellness visit  Last Medicare Wellness visit information reviewed, patient interviewed and updates made to the record today  Health Risk Assessment:   Patient rates overall health as good  Patient feels that their physical health rating is same  Patient is satisfied with their life  Eyesight was rated as same  Hearing was rated as same  Patient feels that their emotional and mental health rating is slightly better  Patients states they are sometimes angry  Patient states they are sometimes unusually tired/fatigued  Pain experienced in the last 7 days has been some  Patient's pain rating has been 5/10  Patient states that she has experienced no weight loss or gain in last 6 months  Depression Screening:   PHQ-2 Score: 0      Fall Risk Screening: In the past year, patient has experienced: no history of falling in past year      Urinary Incontinence Screening:   Patient has leaked urine accidently in the last six months  Home Safety:  Patient does not have trouble with stairs inside or outside of their home  Patient has working smoke alarms and has no working carbon monoxide detector  Home safety hazards include: none  Step to pattern on stairs when having knee pain    Nutrition:   Current diet is Regular  Medications:   Patient is currently taking over-the-counter supplements   OTC medications include: Centrum silver multivitamin, vitamin D and CoQ10 as asvised, claritin discontinued at this time without problem    Patient is able to manage medications  Interested in somethimg to address rash when touching pines, cucumber and zucchini  jose    Activities of Daily Living (ADLs)/Instrumental Activities of Daily Living (IADLs):   Walk and transfer into and out of bed and chair?: Yes  Dress and groom yourself?: Yes    Bathe or shower yourself?: Yes    Feed yourself?  Yes  Do your laundry/housekeeping?: Yes  Manage your money, pay your bills and track your expenses?: Yes  Make your own meals?: Yes    Do your own shopping?: Yes    Previous Hospitalizations:   Any hospitalizations or ED visits within the last 12 months?: No      Advance Care Planning:   Living will: No    Durable POA for healthcare: No    Advanced directive: No    Advanced directive counseling given: Yes    Five wishes given: Yes    Patient declined ACP directive: No    End of Life Decisions reviewed with patient: Yes    Provider agrees with end of life decisions: Yes      Cognitive Screening:   Provider or family/friend/caregiver concerned regarding cognition?: No    PREVENTIVE SCREENINGS      Cardiovascular Screening:    General: Screening Not Indicated and History Lipid Disorder      Diabetes Screening:     General: Screening Current      Colorectal Cancer Screening:       Due for: Colonoscopy - Low Risk      Breast Cancer Screening:       Due for: Mammogram        Cervical Cancer Screening:    General: Screening Not Indicated      Osteoporosis Screening:      Due for: DXA Appendicular      Abdominal Aortic Aneurysm (AAA) Screening:        General: Screening Not Indicated      Lung Cancer Screening:     General: Screening Not Indicated      Hepatitis C Screening:    General: Screening Not Indicated    Screening, Brief Intervention, and Referral to Treatment (SBIRT)    Screening  Typical number of drinks in a day: 1  Typical number of drinks in a week: 2  Interpretation: Low risk drinking behavior  AUDIT-C Screenin) How often did you have a drink containing alcohol in the past year? 2 to 3 times a week  2) How many drinks did you have on a typical day when you were drinking in the past year? 1 to 2  3) How often did you have 6 or more drinks on one occasion in the past year? never    AUDIT-C Score: 3  Interpretation: Score 3-12 (female): POSITIVE screen for alcohol misuse    AUDIT Screenin) How often during the last year have you found that you were not able to stop drinking once you had started? 0 - never  5) How often during the last year have you failed to do what was normally expected from you because of drinking? 0 - never  6) How often during the last year have you needed a first drink in the morning to get yourself going after a heavy drinking session?  0 - never  7) How often during the last year have you had a feeling of guilt or remorse after drinking? 0 - never  8) How often during the last year have you been unable to remember what happened the night before because you had been drinking? 0 - never  9) Have you or someone else been injured as a result of your drinking? 0 - no  10) Has a relative or friend or a doctor or another health worker been concerned about your drinking or suggested you cut down? 0 - no    AUDIT Score: 3  Interpretation: Low risk alcohol consumption    Single Item Drug Screening:  How often have you used an illegal drug (including marijuana) or a prescription medication for non-medical reasons in the past year? never    Single Item Drug Screen Score: 0  Interpretation: Negative screen for possible drug use disorder      Antwon Allison PA-C

## 2021-08-17 NOTE — PROGRESS NOTES
Assessment/Plan:  Problem List Items Addressed This Visit        Cardiovascular and Mediastinum    Essential hypertension    Relevant Medications    losartan (COZAAR) 100 MG tablet    hydrochlorothiazide (HYDRODIURIL) 12 5 mg tablet       Musculoskeletal and Integument    Atrophy of muscle of right hand     Will get xray right wrist and refer to hand surgery  Pt may need EMG  Defers medication at this time  Relevant Orders    XR wrist 3+ vw right    Ambulatory referral to Hand Surgery    Allergic contact dermatitis due to plants, except food     Triamcinolone cream ordered  If no improvement consider PO hydroxyzine prn  Relevant Medications    triamcinolone (KENALOG) 0 1 % cream       Other    Obesity, morbid (HCC)    Right wrist pain    Pre-diabetes - Primary     Lifestyle modification discussed  Start low dose metformin  Directions for use and possible side effects discussed and patient verbalized understanding of these  Recheck 3 months  Relevant Medications    metFORMIN (GLUCOPHAGE) 500 mg tablet    Chronic pain of right knee     Will get xray right knee  Orthopedic referral placed  Defers medication at this time  Relevant Orders    XR knee 3 vw right non injury    Ambulatory referral to Orthopedic Surgery      Other Visit Diagnoses     Encounter for screening mammogram for malignant neoplasm of breast        Relevant Orders    Mammo screening bilateral w 3d & cad    Medicare annual wellness visit, subsequent        Colon cancer screening        Relevant Orders    Ambulatory referral to Gastroenterology           Diagnoses and all orders for this visit:    Pre-diabetes  -     metFORMIN (GLUCOPHAGE) 500 mg tablet;  Take 1 tablet (500 mg total) by mouth 2 (two) times a day with meals    Encounter for screening mammogram for malignant neoplasm of breast  -     Mammo screening bilateral w 3d & cad; Future    Medicare annual wellness visit, subsequent    Colon cancer screening  - Ambulatory referral to Gastroenterology; Future    Obesity, morbid (HCC)    Right wrist pain    Atrophy of muscle of right hand  -     XR wrist 3+ vw right; Future  -     Ambulatory referral to Hand Surgery; Future    Allergic contact dermatitis due to plants, except food  -     triamcinolone (KENALOG) 0 1 % cream; Apply topically 2 (two) times a day    Essential hypertension  -     losartan (COZAAR) 100 MG tablet; Take 1 tablet (100 mg total) by mouth daily  -     hydrochlorothiazide (HYDRODIURIL) 12 5 mg tablet; Take 1 tablet (12 5 mg total) by mouth daily    Chronic pain of right knee  -     XR knee 3 vw right non injury; Future  -     Ambulatory referral to Orthopedic Surgery; Future        Allergic contact dermatitis due to plants, except food  Triamcinolone cream ordered  If no improvement consider PO hydroxyzine prn  Atrophy of muscle of right hand  Will get xray right wrist and refer to hand surgery  Pt may need EMG  Defers medication at this time  Chronic pain of right knee  Will get xray right knee  Orthopedic referral placed  Defers medication at this time  Pre-diabetes  Lifestyle modification discussed  Start low dose metformin  Directions for use and possible side effects discussed and patient verbalized understanding of these  Recheck 3 months  Subjective:      Patient ID: Ray Taylor is a 77 y o  female  Pt presents for routine visit  Subsequent AWV also performed  Labs are up to date and revealed elevated fasting glucose and elevated triglycerides  A1C in office is 6 3  PT agreeable to starting low herrera metformin  She is due for CRC Screening, mammogram, dexa scan  Covid vaccination complete  She desires a cream for a rash that she gets on her arms when gardening that is red and itchy  She also desires evaluation for right knee pain and right wrist pain  Both are chronic  She has atrophy of the muscles along her right forearm  She is left hand dominant   She has numbness and tingling in her thumb, index, middle fingers  ROM of wrist is limited  Strength intact  There is swelling of the medial wrist  She tried voltaren gel for both her knee and wrist without relief  The following portions of the patient's history were reviewed and updated as appropriate:   She has a past medical history of Allergic, Disease of thyroid gland, and Hypertension  ,  does not have any pertinent problems on file  ,   has no past surgical history on file  ,  family history includes Cancer in her father; Thyroid disease in her mother  ,   reports that she has quit smoking  She has never used smokeless tobacco  She reports current alcohol use  She reports that she does not use drugs  ,  is allergic to penicillins     Current Outpatient Medications   Medication Sig Dispense Refill    Cholecalciferol (VITAMIN D3) 50 MCG (2000 UT) TABS Take 2,000 Units by mouth daily      hydrochlorothiazide (HYDRODIURIL) 12 5 mg tablet Take 1 tablet (12 5 mg total) by mouth daily 90 tablet 1    levothyroxine 88 mcg tablet Take 1 tablet (88 mcg total) by mouth daily 90 tablet 1    losartan (COZAAR) 100 MG tablet Take 1 tablet (100 mg total) by mouth daily 90 tablet 1    Multiple Vitamin (MULTIVITAMIN) tablet Take 1 tablet by mouth daily      pravastatin (PRAVACHOL) 20 mg tablet Take 1 tablet (20 mg total) by mouth daily 90 tablet 1    loratadine (CLARITIN) 10 mg tablet Take 10 mg by mouth daily      metFORMIN (GLUCOPHAGE) 500 mg tablet Take 1 tablet (500 mg total) by mouth 2 (two) times a day with meals 60 tablet 5    triamcinolone (KENALOG) 0 1 % cream Apply topically 2 (two) times a day 30 g 0     No current facility-administered medications for this visit  Review of Systems   Constitutional: Negative for chills and fever  HENT: Negative for congestion, ear pain, hearing loss, postnasal drip, rhinorrhea, sinus pressure, sinus pain, sore throat and trouble swallowing      Eyes: Negative for pain and visual disturbance  Respiratory: Negative for cough, chest tightness, shortness of breath and wheezing  Cardiovascular: Negative  Negative for chest pain, palpitations and leg swelling  Gastrointestinal: Negative for abdominal pain, blood in stool, constipation, diarrhea, nausea and vomiting  Endocrine: Negative for cold intolerance, heat intolerance, polydipsia, polyphagia and polyuria  Genitourinary: Negative for difficulty urinating, dysuria, flank pain and urgency  Musculoskeletal: Positive for arthralgias  Negative for back pain, gait problem and myalgias  Skin: Positive for rash  Allergic/Immunologic: Negative  Neurological: Positive for numbness  Negative for dizziness, weakness, light-headedness and headaches  Hematological: Negative  Psychiatric/Behavioral: Negative for behavioral problems, dysphoric mood and sleep disturbance  The patient is not nervous/anxious  PHQ-9 Depression Screening    PHQ-9:   Frequency of the following problems over the past two weeks:      Little interest or pleasure in doing things: 0 - not at all  Feeling down, depressed, or hopeless: 0 - not at all  PHQ-2 Score: 0          Objective:  Vitals:    08/17/21 1018   BP: 132/80   Pulse: 96   Temp: 97 5 °F (36 4 °C)   SpO2: 97%   Weight: 110 kg (243 lb 6 oz)   Height: 5' 4" (1 626 m)     Body mass index is 41 78 kg/m²  Physical Exam  Constitutional:       General: She is not in acute distress  Appearance: She is well-developed  She is not diaphoretic  HENT:      Head: Normocephalic and atraumatic  Right Ear: External ear normal       Left Ear: External ear normal       Nose: Nose normal       Mouth/Throat:      Pharynx: No oropharyngeal exudate  Eyes:      General: No scleral icterus  Right eye: No discharge  Left eye: No discharge  Conjunctiva/sclera: Conjunctivae normal       Pupils: Pupils are equal, round, and reactive to light  Neck:      Thyroid: No thyromegaly  Cardiovascular:      Rate and Rhythm: Normal rate and regular rhythm  Heart sounds: Normal heart sounds  No murmur heard  No friction rub  No gallop  Pulmonary:      Effort: Pulmonary effort is normal  No respiratory distress  Breath sounds: Normal breath sounds  No wheezing or rales  Abdominal:      General: Bowel sounds are normal  There is no distension  Palpations: Abdomen is soft  Tenderness: There is no abdominal tenderness  Musculoskeletal:         General: No tenderness or deformity  Right wrist: Swelling present  Decreased range of motion  Cervical back: Normal range of motion and neck supple  Skin:     General: Skin is warm and dry  Neurological:      Mental Status: She is alert and oriented to person, place, and time  Cranial Nerves: No cranial nerve deficit  Psychiatric:         Behavior: Behavior normal          Thought Content:  Thought content normal          Judgment: Judgment normal

## 2021-08-17 NOTE — PATIENT INSTRUCTIONS
Medicare Preventive Visit Patient Instructions  Thank you for completing your Welcome to Medicare Visit or Medicare Annual Wellness Visit today  Your next wellness visit will be due in one year (8/18/2022)  The screening/preventive services that you may require over the next 5-10 years are detailed below  Some tests may not apply to you based off risk factors and/or age  Screening tests ordered at today's visit but not completed yet may show as past due  Also, please note that scanned in results may not display below  Preventive Screenings:  Service Recommendations Previous Testing/Comments   Colorectal Cancer Screening  * Colonoscopy    * Fecal Occult Blood Test (FOBT)/Fecal Immunochemical Test (FIT)  * Fecal DNA/Cologuard Test  * Flexible Sigmoidoscopy Age: 54-65 years old   Colonoscopy: every 10 years (may be performed more frequently if at higher risk)  OR  FOBT/FIT: every 1 year  OR  Cologuard: every 3 years  OR  Sigmoidoscopy: every 5 years  Screening may be recommended earlier than age 48 if at higher risk for colorectal cancer  Also, an individualized decision between you and your healthcare provider will decide whether screening between the ages of 74-80 would be appropriate  Colonoscopy: Not on file  FOBT/FIT: Not on file  Cologuard: Not on file  Sigmoidoscopy: Not on file          Breast Cancer Screening Age: 36 years old  Frequency: every 1-2 years  Not required if history of left and right mastectomy Mammogram: Not on file        Cervical Cancer Screening Between the ages of 21-29, pap smear recommended once every 3 years  Between the ages of 33-67, can perform pap smear with HPV co-testing every 5 years     Recommendations may differ for women with a history of total hysterectomy, cervical cancer, or abnormal pap smears in past  Pap Smear: Not on file    Screening Not Indicated   Hepatitis C Screening Once for adults born between [facility]  More frequently in patients at high risk for Hepatitis C Hep C Antibody: Not on file        Diabetes Screening 1-2 times per year if you're at risk for diabetes or have pre-diabetes Fasting glucose: 137 mg/dL   A1C: No results in last 5 years    Screening Current   Cholesterol Screening Once every 5 years if you don't have a lipid disorder  May order more often based on risk factors  Lipid panel: 08/10/2021    Screening Not Indicated  History Lipid Disorder     Other Preventive Screenings Covered by Medicare:  1  Abdominal Aortic Aneurysm (AAA) Screening: covered once if your at risk  You're considered to be at risk if you have a family history of AAA  2  Lung Cancer Screening: covers low dose CT scan once per year if you meet all of the following conditions: (1) Age 50-69; (2) No signs or symptoms of lung cancer; (3) Current smoker or have quit smoking within the last 15 years; (4) You have a tobacco smoking history of at least 30 pack years (packs per day multiplied by number of years you smoked); (5) You get a written order from a healthcare provider  3  Glaucoma Screening: covered annually if you're considered high risk: (1) You have diabetes OR (2) Family history of glaucoma OR (3)  aged 48 and older OR (3)  American aged 72 and older  3  Osteoporosis Screening: covered every 2 years if you meet one of the following conditions: (1) You're estrogen deficient and at risk for osteoporosis based off medical history and other findings; (2) Have a vertebral abnormality; (3) On glucocorticoid therapy for more than 3 months; (4) Have primary hyperparathyroidism; (5) On osteoporosis medications and need to assess response to drug therapy  · Last bone density test (DXA Scan): Not on file  5  HIV Screening: covered annually if you're between the age of 12-76  Also covered annually if you are younger than 13 and older than 72 with risk factors for HIV infection   For pregnant patients, it is covered up to 3 times per pregnancy  Immunizations:  Immunization Recommendations   Influenza Vaccine Annual influenza vaccination during flu season is recommended for all persons aged >= 6 months who do not have contraindications   Pneumococcal Vaccine (Prevnar and Pneumovax)  * Prevnar = PCV13  * Pneumovax = PPSV23   Adults 25-60 years old: 1-3 doses may be recommended based on certain risk factors  Adults 72 years old: Prevnar (PCV13) vaccine recommended followed by Pneumovax (PPSV23) vaccine  If already received PPSV23 since turning 65, then PCV13 recommended at least one year after PPSV23 dose  Hepatitis B Vaccine 3 dose series if at intermediate or high risk (ex: diabetes, end stage renal disease, liver disease)   Tetanus (Td) Vaccine - COST NOT COVERED BY MEDICARE PART B Following completion of primary series, a booster dose should be given every 10 years to maintain immunity against tetanus  Td may also be given as tetanus wound prophylaxis  Tdap Vaccine - COST NOT COVERED BY MEDICARE PART B Recommended at least once for all adults  For pregnant patients, recommended with each pregnancy  Shingles Vaccine (Shingrix) - COST NOT COVERED BY MEDICARE PART B  2 shot series recommended in those aged 48 and above     Health Maintenance Due:      Topic Date Due    Hepatitis C Screening  Never done    Breast Cancer Screening: Mammogram  Never done    Colorectal Cancer Screening  Never done     Immunizations Due:      Topic Date Due    Pneumococcal Vaccine: 65+ Years (1 of 1 - PPSV23) Never done    Influenza Vaccine (1) 09/01/2021     Advance Directives   What are advance directives? Advance directives are legal documents that state your wishes and plans for medical care  These plans are made ahead of time in case you lose your ability to make decisions for yourself  Advance directives can apply to any medical decision, such as the treatments you want, and if you want to donate organs  What are the types of advance directives? There are many types of advance directives, and each state has rules about how to use them  You may choose a combination of any of the following:  · Living will: This is a written record of the treatment you want  You can also choose which treatments you do not want, which to limit, and which to stop at a certain time  This includes surgery, medicine, IV fluid, and tube feedings  · Durable power of  for healthcare [facility]): This is a written record that states who you want to make healthcare choices for you when you are unable to make them for yourself  This person, called a proxy, is usually a family member or a friend  You may choose more than 1 proxy  · Do not resuscitate (DNR) order:  A DNR order is used in case your heart stops beating or you stop breathing  It is a request not to have certain forms of treatment, such as CPR  A DNR order may be included in other types of advance directives  · Medical directive: This covers the care that you want if you are in a coma, near death, or unable to make decisions for yourself  You can list the treatments you want for each condition  Treatment may include pain medicine, surgery, blood transfusions, dialysis, IV or tube feedings, and a ventilator (breathing machine)  · Values history: This document has questions about your views, beliefs, and how you feel and think about life  This information can help others choose the care that you would choose  Why are advance directives important? An advance directive helps you control your care  Although spoken wishes may be used, it is better to have your wishes written down  Spoken wishes can be misunderstood, or not followed  Treatments may be given even if you do not want them  An advance directive may make it easier for your family to make difficult choices about your care  Urinary Incontinence   Urinary incontinence (UI)  is when you lose control of your bladder   UI develops because your bladder cannot store or empty urine properly  The 3 most common types of UI are stress incontinence, urge incontinence, or both  Medicines:   · May be given to help strengthen your bladder control  Report any side effects of medication to your healthcare provider  Do pelvic muscle exercises often:  Your pelvic muscles help you stop urinating  Squeeze these muscles tight for 5 seconds, then relax for 5 seconds  Gradually work up to squeezing for 10 seconds  Do 3 sets of 15 repetitions a day, or as directed  This will help strengthen your pelvic muscles and improve bladder control  Train your bladder:  Go to the bathroom at set times, such as every 2 hours, even if you do not feel the urge to go  You can also try to hold your urine when you feel the urge to go  For example, hold your urine for 5 minutes when you feel the urge to go  As that becomes easier, hold your urine for 10 minutes  Self-care:   · Keep a UI record  Write down how often you leak urine and how much you leak  Make a note of what you were doing when you leaked urine  · Drink liquids as directed  You may need to limit the amount of liquid you drink to help control your urine leakage  Do not drink any liquid right before you go to bed  Limit or do not have drinks that contain caffeine or alcohol  · Prevent constipation  Eat a variety of high-fiber foods  Good examples are high-fiber cereals, beans, vegetables, and whole-grain breads  Walking is the best way to trigger your intestines to have a bowel movement  · Exercise regularly and maintain a healthy weight  Weight loss and exercise will decrease pressure on your bladder and help you control your leakage  · Use a catheter as directed  to help empty your bladder  A catheter is a tiny, plastic tube that is put into your bladder to drain your urine  · Go to behavior therapy as directed  Behavior therapy may be used to help you learn to control your urge to urinate      Weight Management   Why it is important to manage your weight:  Being overweight increases your risk of health conditions such as heart disease, high blood pressure, type 2 diabetes, and certain types of cancer  It can also increase your risk for osteoarthritis, sleep apnea, and other respiratory problems  Aim for a slow, steady weight loss  Even a small amount of weight loss can lower your risk of health problems  How to lose weight safely:  A safe and healthy way to lose weight is to eat fewer calories and get regular exercise  You can lose up about 1 pound a week by decreasing the number of calories you eat by 500 calories each day  Healthy meal plan for weight management:  A healthy meal plan includes a variety of foods, contains fewer calories, and helps you stay healthy  A healthy meal plan includes the following:  · Eat whole-grain foods more often  A healthy meal plan should contain fiber  Fiber is the part of grains, fruits, and vegetables that is not broken down by your body  Whole-grain foods are healthy and provide extra fiber in your diet  Some examples of whole-grain foods are whole-wheat breads and pastas, oatmeal, brown rice, and bulgur  · Eat a variety of vegetables every day  Include dark, leafy greens such as spinach, kale, live greens, and mustard greens  Eat yellow and orange vegetables such as carrots, sweet potatoes, and winter squash  · Eat a variety of fruits every day  Choose fresh or canned fruit (canned in its own juice or light syrup) instead of juice  Fruit juice has very little or no fiber  · Eat low-fat dairy foods  Drink fat-free (skim) milk or 1% milk  Eat fat-free yogurt and low-fat cottage cheese  Try low-fat cheeses such as mozzarella and other reduced-fat cheeses  · Choose meat and other protein foods that are low in fat  Choose beans or other legumes such as split peas or lentils  Choose fish, skinless poultry (chicken or turkey), or lean cuts of red meat (beef or pork)   Before you cook meat or poultry, cut off any visible fat  · Use less fat and oil  Try baking foods instead of frying them  Add less fat, such as margarine, sour cream, regular salad dressing and mayonnaise to foods  Eat fewer high-fat foods  Some examples of high-fat foods include french fries, doughnuts, ice cream, and cakes  · Eat fewer sweets  Limit foods and drinks that are high in sugar  This includes candy, cookies, regular soda, and sweetened drinks  Exercise:  Exercise at least 30 minutes per day on most days of the week  Some examples of exercise include walking, biking, dancing, and swimming  You can also fit in more physical activity by taking the stairs instead of the elevator or parking farther away from stores  Ask your healthcare provider about the best exercise plan for you  Alcohol Use and Your Health    Drinking too much can harm your health  Excessive alcohol use leads to about 88,000 death in the [place] each year, and shortens the life of those who diet by almost 30 years  Further, excessive drinking cost the economy $249 billion in 2010  Most excessive drinkers are not alcohol dependent  Excessive alcohol use has immediate effects that increase the risk of many harmful health conditions  These are most often the result of binge drinking  Over time, excessive alcohol use can lead to the development of chronic diseases and other series health problems  What is considered a "drink"? Excessive alcohol use includes:  · Binge Drinking: For women, 4 or more drinks consumed on one occasion  For men, 5 or more drinks consumed on one occasion  · Heavy Drinking: For women, 8 or more drinks per week   For men, 15 or more drinks per week  · Any alcohol used by pregnant women  · Any alcohol used by those under the age of 21 years    If you choose to drink, do so in moderation:  · Do not drink at all if you are under the age of 24, or if you are or may be pregnant, or have health problems that could be made worse by drinking  · For women, up to 1 drink per day  · For men, up to 2 drinks a day    No one should begin drinking or drink more frequently based on potential health benefits    Short-Term Health Risks:  · Injuries: motor vehicle crashes, falls, drownings, burns  · Violence: homicide, suicide, sexual assault, intimate partner violence  · Alcohol poisoning  · Reproductive health: risky sexual behaviors, unintended prengnacy, sexually transmitted diseases, miscarriage, stillbirth, fetal alcohol syndrome    Long-Term Health Risks:  · Chronic diseases: high blood pressure, heart disease, stroke, liver disease, digestive problems  · Cancers: breast, mouth and throat, liver, colon  · Learning and memory problems: dementia, poor school performance  · Mental health: depression, anxiety, insomnia  · Social problems: lost productivity, family problems, unemployment  · Alcohol dependence    For support and more information:  · Substance Abuse and [facility] [address] [place]  Web Address: https://Lumaqco/    · Alcoholics Anonymous        Web Address: http://www parker info/    https://www cdc gov/alcohol/fact-sheets/alcohol-use htm     © Copyright [employer] 2018 Information is for End User's use only and may not be sold, redistributed or otherwise used for commercial purposes   All illustrations and images included in CareNotes® are the copyrighted property of A D A M , Inc  or [address] [place] [address]

## 2021-08-17 NOTE — ASSESSMENT & PLAN NOTE
Will get xray right wrist and refer to hand surgery  Pt may need EMG  Defers medication at this time

## 2021-08-24 ENCOUNTER — TELEPHONE (OUTPATIENT)
Dept: INTERNAL MEDICINE CLINIC | Facility: CLINIC | Age: 66
End: 2021-08-24

## 2021-08-24 NOTE — TELEPHONE ENCOUNTER
----- Message from Ramon Navarrete sent at 8/24/2021  1:05 PM EDT -----  Regarding: Test Results Question  Contact: [phone]  I had x-rays of right wrist and right knee on 8/17  I have yet to see results on my chart  Will these be posted?

## 2021-08-24 NOTE — TELEPHONE ENCOUNTER
Patient had xrays completed 8/17  Can you please review these so we can update the patient on results  Thank you!

## 2021-08-30 ENCOUNTER — TELEPHONE (OUTPATIENT)
Dept: GASTROENTEROLOGY | Facility: CLINIC | Age: 66
End: 2021-08-30

## 2021-09-15 ENCOUNTER — TELEPHONE (OUTPATIENT)
Dept: OBGYN CLINIC | Facility: CLINIC | Age: 66
End: 2021-09-15

## 2021-09-16 DIAGNOSIS — M25.531 RIGHT WRIST PAIN: Primary | ICD-10-CM

## 2021-10-22 DIAGNOSIS — E78.5 HYPERLIPIDEMIA, UNSPECIFIED HYPERLIPIDEMIA TYPE: ICD-10-CM

## 2021-10-22 DIAGNOSIS — E03.9 ACQUIRED HYPOTHYROIDISM: ICD-10-CM

## 2021-10-22 RX ORDER — PRAVASTATIN SODIUM 20 MG
20 TABLET ORAL DAILY
Qty: 90 TABLET | Refills: 0 | Status: SHIPPED | OUTPATIENT
Start: 2021-10-22 | End: 2022-01-22 | Stop reason: SDUPTHER

## 2021-10-22 RX ORDER — LEVOTHYROXINE SODIUM 88 UG/1
88 TABLET ORAL DAILY
Qty: 90 TABLET | Refills: 0 | Status: SHIPPED | OUTPATIENT
Start: 2021-10-22 | End: 2022-01-22 | Stop reason: SDUPTHER

## 2021-11-23 ENCOUNTER — OFFICE VISIT (OUTPATIENT)
Dept: INTERNAL MEDICINE CLINIC | Facility: CLINIC | Age: 66
End: 2021-11-23
Payer: MEDICARE

## 2021-11-23 ENCOUNTER — APPOINTMENT (OUTPATIENT)
Dept: RADIOLOGY | Facility: CLINIC | Age: 66
End: 2021-11-23
Payer: MEDICARE

## 2021-11-23 VITALS
DIASTOLIC BLOOD PRESSURE: 70 MMHG | TEMPERATURE: 98.7 F | SYSTOLIC BLOOD PRESSURE: 158 MMHG | BODY MASS INDEX: 41.57 KG/M2 | WEIGHT: 243.5 LBS | OXYGEN SATURATION: 97 % | HEIGHT: 64 IN | HEART RATE: 84 BPM

## 2021-11-23 DIAGNOSIS — R73.03 PRE-DIABETES: Primary | ICD-10-CM

## 2021-11-23 DIAGNOSIS — M54.50 CHRONIC BILATERAL LOW BACK PAIN WITHOUT SCIATICA: ICD-10-CM

## 2021-11-23 DIAGNOSIS — G89.29 CHRONIC BILATERAL LOW BACK PAIN WITHOUT SCIATICA: ICD-10-CM

## 2021-11-23 DIAGNOSIS — I10 ESSENTIAL HYPERTENSION: ICD-10-CM

## 2021-11-23 DIAGNOSIS — M25.531 RIGHT WRIST PAIN: ICD-10-CM

## 2021-11-23 PROBLEM — F11.20 CONTINUOUS OPIOID DEPENDENCE (HCC): Status: RESOLVED | Noted: 2021-11-23 | Resolved: 2021-11-23

## 2021-11-23 PROBLEM — F11.20 CONTINUOUS OPIOID DEPENDENCE (HCC): Status: ACTIVE | Noted: 2021-11-23

## 2021-11-23 LAB — SL AMB POCT HEMOGLOBIN AIC: 6.5 (ref ?–6.5)

## 2021-11-23 PROCEDURE — 72110 X-RAY EXAM L-2 SPINE 4/>VWS: CPT

## 2021-11-23 PROCEDURE — 99214 OFFICE O/P EST MOD 30 MIN: CPT | Performed by: PHYSICIAN ASSISTANT

## 2021-11-23 PROCEDURE — 83036 HEMOGLOBIN GLYCOSYLATED A1C: CPT | Performed by: PHYSICIAN ASSISTANT

## 2021-11-23 RX ORDER — NAPROXEN 500 MG/1
TABLET ORAL
COMMUNITY
Start: 2021-10-13 | End: 2021-11-23

## 2021-11-23 RX ORDER — OXYCODONE HYDROCHLORIDE 5 MG/1
TABLET ORAL
COMMUNITY
Start: 2021-10-13 | End: 2021-11-23

## 2021-11-23 RX ORDER — CLINDAMYCIN HYDROCHLORIDE 300 MG/1
CAPSULE ORAL
COMMUNITY
Start: 2021-10-13 | End: 2021-11-23

## 2021-11-24 PROBLEM — M54.50 CHRONIC BILATERAL LOW BACK PAIN WITHOUT SCIATICA: Status: ACTIVE | Noted: 2021-11-24

## 2021-11-24 PROBLEM — G89.29 CHRONIC BILATERAL LOW BACK PAIN WITHOUT SCIATICA: Status: ACTIVE | Noted: 2021-11-24

## 2021-12-18 DIAGNOSIS — R73.03 PRE-DIABETES: ICD-10-CM

## 2022-01-22 DIAGNOSIS — E03.9 ACQUIRED HYPOTHYROIDISM: ICD-10-CM

## 2022-01-22 DIAGNOSIS — R73.03 PRE-DIABETES: ICD-10-CM

## 2022-01-22 DIAGNOSIS — E78.5 HYPERLIPIDEMIA, UNSPECIFIED HYPERLIPIDEMIA TYPE: ICD-10-CM

## 2022-01-25 RX ORDER — LEVOTHYROXINE SODIUM 88 UG/1
88 TABLET ORAL DAILY
Qty: 90 TABLET | Refills: 0 | Status: SHIPPED | OUTPATIENT
Start: 2022-01-25 | End: 2022-04-23 | Stop reason: SDUPTHER

## 2022-01-25 RX ORDER — PRAVASTATIN SODIUM 20 MG
20 TABLET ORAL DAILY
Qty: 90 TABLET | Refills: 0 | Status: SHIPPED | OUTPATIENT
Start: 2022-01-25 | End: 2022-04-23 | Stop reason: SDUPTHER

## 2022-02-25 ENCOUNTER — TELEMEDICINE (OUTPATIENT)
Dept: INTERNAL MEDICINE CLINIC | Facility: CLINIC | Age: 67
End: 2022-02-25
Payer: MEDICARE

## 2022-02-25 DIAGNOSIS — E28.39 PRIMARY OVARIAN FAILURE: ICD-10-CM

## 2022-02-25 DIAGNOSIS — Z12.31 VISIT FOR SCREENING MAMMOGRAM: ICD-10-CM

## 2022-02-25 DIAGNOSIS — Z12.11 COLON CANCER SCREENING: ICD-10-CM

## 2022-02-25 DIAGNOSIS — G89.29 CHRONIC BILATERAL LOW BACK PAIN WITHOUT SCIATICA: Primary | ICD-10-CM

## 2022-02-25 DIAGNOSIS — R73.03 PRE-DIABETES: Primary | ICD-10-CM

## 2022-02-25 DIAGNOSIS — M54.50 CHRONIC BILATERAL LOW BACK PAIN WITHOUT SCIATICA: Primary | ICD-10-CM

## 2022-02-25 PROCEDURE — 99214 OFFICE O/P EST MOD 30 MIN: CPT | Performed by: PHYSICIAN ASSISTANT

## 2022-02-25 RX ORDER — DICLOFENAC SODIUM 75 MG/1
75 TABLET, DELAYED RELEASE ORAL 2 TIMES DAILY
Qty: 60 TABLET | Refills: 2 | Status: SHIPPED | OUTPATIENT
Start: 2022-02-25

## 2022-02-25 NOTE — PROGRESS NOTES
Virtual Regular Visit    Verification of patient location:    Patient is located in the following state in which I hold an active license [place]      Assessment/Plan:    Problem List Items Addressed This Visit        Other    Pre-diabetes - Primary     Update A1c  If it remains elevated will need to find an alternative that is more affordable for pt          Relevant Orders    Hemoglobin A1C      Other Visit Diagnoses     Visit for screening mammogram        Relevant Orders    Mammo screening bilateral w 3d & cad    Primary ovarian failure        Relevant Orders    DXA bone density spine hip and pelvis    Colon cancer screening        Relevant Orders    Ambulatory referral for colonoscopy          BMI Counseling: There is no height or weight on file to calculate BMI  The BMI is above normal  Nutrition recommendations include decreasing portion sizes, consuming healthier snacks and limiting drinks that contain sugar  Rationale for BMI follow-up plan is due to patient being overweight or obese  Reason for visit is No chief complaint on file  Encounter provider Kirkwood Dandy, PA-C    Provider located at [address] [place] [id number]      Recent Visits  No visits were found meeting these conditions  Showing recent visits within past 7 days and meeting all other requirements  Today's Visits  Date Type Provider Dept   02/25/22 Telemedicine Benita Santos PA-C  Abner Coreas today's visits and meeting all other requirements  Future Appointments  No visits were found meeting these conditions  Showing future appointments within next 150 days and meeting all other requirements       The patient was identified by name and date of birth  Mira Laguerre was informed that this is a telemedicine visit and that the visit is being conducted through [facility] and patient was informed this is a secure, HIPAA-complaint platform   She agrees to proceed     My office door was closed  No one else was in the room  She acknowledged consent and understanding of privacy and security of the video platform  The patient has agreed to participate and understands they can discontinue the visit at any time  Patient is aware this is a billable service  Subjective  Ana Bhatia is a 74 y o  female    Pt is seen for virtual visit  She is doing well overall  She is noting ongoing back pain  Xray revealed moderate to severe degenerative and facet disease  She is holding off on seeing ortho  Will provide PO anti-inflammatory  She would be interested in toradol at our next visit in office  She is due for A1C  If remains elevated will need an alternative to invokana due to cost  She was unable to tolerate metformin due to GI issues  She is also due for mammogram, dexa scan, CRC screening  Labs are up to date  Past Medical History:   Diagnosis Date    Allergic     Disease of thyroid gland     Hypertension        Past Surgical History:   Procedure Laterality Date    ENDOSCOPIC RELEASE TRANSVERSE CARPAL LIGAMENT OF HAND Right        Current Outpatient Medications   Medication Sig Dispense Refill    canagliflozin (Invokana) 100 mg Take 1 tablet (100 mg total) by mouth daily before breakfast 30 tablet 0    Cholecalciferol (VITAMIN D3) 50 MCG (2000 UT) TABS Take 2,000 Units by mouth daily      hydrochlorothiazide (HYDRODIURIL) 12 5 mg tablet Take 1 tablet (12 5 mg total) by mouth daily 90 tablet 1    levothyroxine 88 mcg tablet Take 1 tablet (88 mcg total) by mouth daily 90 tablet 0    losartan (COZAAR) 100 MG tablet Take 1 tablet (100 mg total) by mouth daily 90 tablet 1    Multiple Vitamin (MULTIVITAMIN) tablet Take 1 tablet by mouth daily      pravastatin (PRAVACHOL) 20 mg tablet Take 1 tablet (20 mg total) by mouth daily 90 tablet 0     No current facility-administered medications for this visit          Allergies   Allergen Reactions  Penicillins        Review of Systems   Constitutional: Negative for chills and fever  HENT: Negative for congestion, ear pain, hearing loss, postnasal drip, rhinorrhea, sinus pressure, sinus pain, sore throat and trouble swallowing  Eyes: Negative for pain and visual disturbance  Respiratory: Negative for cough, chest tightness, shortness of breath and wheezing  Cardiovascular: Negative  Negative for chest pain, palpitations and leg swelling  Gastrointestinal: Negative for abdominal pain, blood in stool, constipation, diarrhea, nausea and vomiting  Endocrine: Negative for cold intolerance, heat intolerance, polydipsia, polyphagia and polyuria  Genitourinary: Negative for difficulty urinating, dysuria, flank pain and urgency  Musculoskeletal: Negative for arthralgias, back pain, gait problem and myalgias  Skin: Negative for rash  Allergic/Immunologic: Negative  Neurological: Negative for dizziness, weakness, light-headedness and headaches  Hematological: Negative  Psychiatric/Behavioral: Negative for behavioral problems, dysphoric mood and sleep disturbance  The patient is not nervous/anxious  Video Exam    There were no vitals filed for this visit  Physical Exam  Nursing note reviewed  Constitutional:       Appearance: She is well-developed  HENT:      Head: Normocephalic and atraumatic  Musculoskeletal:         General: Normal range of motion  Cervical back: Normal range of motion  Neurological:      Mental Status: She is alert  Psychiatric:         Behavior: Behavior normal          Thought Content: Thought content normal          Judgment: Judgment normal           I spent 15 minutes directly with the patient during this visit    VIRTUAL VISIT Wesley Scanlon verbally agrees to participate in [employer]   Pt is aware that [employer] could be limited without vital signs or the ability to perform a full hands-on physical exam  Asha Navarrete understands she or the provider may request at any time to terminate the video visit and request the patient to seek care or treatment in person

## 2022-02-28 ENCOUNTER — APPOINTMENT (OUTPATIENT)
Dept: LAB | Facility: CLINIC | Age: 67
End: 2022-02-28
Payer: MEDICARE

## 2022-02-28 DIAGNOSIS — R73.03 PRE-DIABETES: ICD-10-CM

## 2022-02-28 PROCEDURE — 36415 COLL VENOUS BLD VENIPUNCTURE: CPT

## 2022-02-28 PROCEDURE — 83036 HEMOGLOBIN GLYCOSYLATED A1C: CPT

## 2022-03-01 LAB
EST. AVERAGE GLUCOSE BLD GHB EST-MCNC: 140 MG/DL
HBA1C MFR BLD: 6.5 %

## 2022-04-09 ENCOUNTER — HOSPITAL ENCOUNTER (OUTPATIENT)
Dept: MAMMOGRAPHY | Facility: HOSPITAL | Age: 67
Discharge: HOME | End: 2022-04-09
Payer: MEDICARE

## 2022-04-09 VITALS — HEIGHT: 64 IN | BODY MASS INDEX: 39.27 KG/M2 | WEIGHT: 230 LBS

## 2022-04-09 DIAGNOSIS — Z12.31 VISIT FOR SCREENING MAMMOGRAM: ICD-10-CM

## 2022-04-09 PROCEDURE — 77067 SCR MAMMO BI INCL CAD: CPT

## 2022-04-09 PROCEDURE — 77063 BREAST TOMOSYNTHESIS BI: CPT

## 2022-04-12 ENCOUNTER — OFFICE VISIT (OUTPATIENT)
Dept: INTERNAL MEDICINE CLINIC | Facility: CLINIC | Age: 67
End: 2022-04-12
Payer: MEDICARE

## 2022-04-12 VITALS
SYSTOLIC BLOOD PRESSURE: 128 MMHG | DIASTOLIC BLOOD PRESSURE: 68 MMHG | TEMPERATURE: 97.8 F | HEART RATE: 69 BPM | WEIGHT: 242 LBS | BODY MASS INDEX: 41.32 KG/M2 | HEIGHT: 64 IN | OXYGEN SATURATION: 96 %

## 2022-04-12 DIAGNOSIS — R73.03 PRE-DIABETES: ICD-10-CM

## 2022-04-12 DIAGNOSIS — E11.65 TYPE 2 DIABETES MELLITUS WITH HYPERGLYCEMIA, WITHOUT LONG-TERM CURRENT USE OF INSULIN (HCC): Primary | ICD-10-CM

## 2022-04-12 DIAGNOSIS — Z12.11 COLON CANCER SCREENING: ICD-10-CM

## 2022-04-12 DIAGNOSIS — G89.29 CHRONIC BILATERAL LOW BACK PAIN WITHOUT SCIATICA: ICD-10-CM

## 2022-04-12 DIAGNOSIS — M54.50 CHRONIC BILATERAL LOW BACK PAIN WITHOUT SCIATICA: ICD-10-CM

## 2022-04-12 LAB — SL AMB POCT HEMOGLOBIN AIC: 6.8 (ref ?–6.5)

## 2022-04-12 PROCEDURE — 83036 HEMOGLOBIN GLYCOSYLATED A1C: CPT

## 2022-04-12 PROCEDURE — 96372 THER/PROPH/DIAG INJ SC/IM: CPT

## 2022-04-12 PROCEDURE — 99214 OFFICE O/P EST MOD 30 MIN: CPT

## 2022-04-12 RX ORDER — KETOROLAC TROMETHAMINE 30 MG/ML
60 INJECTION, SOLUTION INTRAMUSCULAR; INTRAVENOUS ONCE
Status: COMPLETED | OUTPATIENT
Start: 2022-04-12 | End: 2022-04-12

## 2022-04-12 RX ADMIN — KETOROLAC TROMETHAMINE 60 MG: 30 INJECTION, SOLUTION INTRAMUSCULAR; INTRAVENOUS at 11:15

## 2022-04-12 NOTE — ASSESSMENT & PLAN NOTE
60mg toradol given IM  Directions for use and possible side effects discussed and patient verbalized understanding of these

## 2022-04-12 NOTE — ASSESSMENT & PLAN NOTE
Lab Results   Component Value Date    HGBA1C 6 8 (A) 04/12/2022     Start Kalie Norton  Foot exam at next visit  Update eye exam  Continue Arb and statin

## 2022-04-12 NOTE — PROGRESS NOTES
Assessment/Plan:  Problem List Items Addressed This Visit        Endocrine    Type 2 diabetes mellitus with hyperglycemia, without long-term current use of insulin [facility])       Lab Results   Component Value Date    HGBA1C 6 8 (A) 04/12/2022     Start [facility]  Foot exam at next visit  Update eye exam  Continue Arb and statin  Relevant Medications    sitaGLIPtin (JANUVIA) 50 mg tablet       Other    Chronic bilateral low back pain without sciatica     60mg toradol given IM  Directions for use and possible side effects discussed and patient verbalized understanding of these  Relevant Medications    ketorolac (TORADOL) 60 mg/2 mL IM injection 60 mg (Completed)      Other Visit Diagnoses     Colon cancer screening    -  Primary    Relevant Orders    Cologuard           Diagnoses and all orders for this visit:    Colon cancer screening  -     Cologuard    Pre-diabetes  -     sitaGLIPtin (JANUVIA) 50 mg tablet; Take 1 tablet (50 mg total) by mouth daily  -     POCT hemoglobin A1c    Chronic bilateral low back pain without sciatica  -     ketorolac (TORADOL) 60 mg/2 mL IM injection 60 mg    Type 2 diabetes mellitus with hyperglycemia, without long-term current use of insulin (HCC)    Other orders  -     Coenzyme Q10-Fish Oil-Vit E (CO-Q 10 Omega-3 Fish Oil) CAPS; Take by mouth        Type 2 diabetes mellitus with hyperglycemia, without long-term current use of insulin [facility])    Lab Results   Component Value Date    HGBA1C 6 8 (A) 04/12/2022     Start [facility]  Foot exam at next visit  Update eye exam  Continue Arb and statin  Chronic bilateral low back pain without sciatica  60mg toradol given IM  Directions for use and possible side effects discussed and patient verbalized understanding of these  Subjective:      Patient ID: Claudia Bryan is a 74 y o  female  Pt presents for routine visit  She is up to date with labs and covid vaccination  She has a hx of pre-diabetes   She was unable to tolerate metformin due to gi side effects and could not afford invokana  She is not on any diabetic medications at this time  A1C today is 6 8  She is due for dexa scan and CRC screening  She complains of increased back pain due to spending more time outside and getting her garden ready  She uses diclofenac prn with relief  The following portions of the patient's history were reviewed and updated as appropriate:   She has a past medical history of Allergic, Disease of thyroid gland, and Hypertension  ,  does not have any pertinent problems on file  ,   has a past surgical history that includes Endoscopic release transverse carpal ligament of hand (Right)  ,  family history includes Breast cancer in her paternal aunt; Cancer in her father; No Known Problems in her daughter, maternal grandmother, paternal aunt, paternal aunt, paternal aunt, and paternal grandmother; Thyroid disease in her mother  ,   reports that she has quit smoking  Her smoking use included cigarettes  She has a 3 75 pack-year smoking history  She has never used smokeless tobacco  She reports current alcohol use  She reports that she does not use drugs  ,  is allergic to penicillins     Current Outpatient Medications   Medication Sig Dispense Refill    Cholecalciferol (VITAMIN D3) 50 MCG (2000 UT) TABS Take 2,000 Units by mouth daily      Coenzyme Q10-Fish Oil-Vit E (CO-Q 10 Omega-3 Fish Oil) CAPS Take by mouth      diclofenac (VOLTAREN) 75 mg EC tablet Take 1 tablet (75 mg total) by mouth 2 (two) times a day (Patient taking differently: Take 75 mg by mouth 2 (two) times a day PRN ) 60 tablet 2    hydrochlorothiazide (HYDRODIURIL) 12 5 mg tablet Take 1 tablet (12 5 mg total) by mouth daily 90 tablet 1    levothyroxine 88 mcg tablet Take 1 tablet (88 mcg total) by mouth daily 90 tablet 0    losartan (COZAAR) 100 MG tablet Take 1 tablet (100 mg total) by mouth daily 90 tablet 1    Multiple Vitamin (MULTIVITAMIN) tablet Take 1 tablet by mouth daily      pravastatin (PRAVACHOL) 20 mg tablet Take 1 tablet (20 mg total) by mouth daily 90 tablet 0    sitaGLIPtin (JANUVIA) 50 mg tablet Take 1 tablet (50 mg total) by mouth daily 30 tablet 5     No current facility-administered medications for this visit  Review of Systems   Constitutional: Negative for chills and fever  HENT: Negative for congestion, ear pain, hearing loss, postnasal drip, rhinorrhea, sinus pressure, sinus pain, sore throat and trouble swallowing  Eyes: Negative for pain and visual disturbance  Respiratory: Negative for cough, chest tightness, shortness of breath and wheezing  Cardiovascular: Negative  Negative for chest pain, palpitations and leg swelling  Gastrointestinal: Negative for abdominal pain, blood in stool, constipation, diarrhea, nausea and vomiting  Endocrine: Negative for cold intolerance, heat intolerance, polydipsia, polyphagia and polyuria  Genitourinary: Negative for difficulty urinating, dysuria, flank pain and urgency  Musculoskeletal: Positive for back pain  Negative for arthralgias, gait problem and myalgias  Skin: Negative for rash  Allergic/Immunologic: Negative  Neurological: Negative for dizziness, weakness, light-headedness and headaches  Hematological: Negative  Psychiatric/Behavioral: Negative for behavioral problems, dysphoric mood and sleep disturbance  The patient is not nervous/anxious  PHQ-2/9 Depression Screening    Little interest or pleasure in doing things: 0 - not at all  Feeling down, depressed, or hopeless: 0 - not at all          Objective:  Vitals:    04/12/22 1039   BP: 128/68   BP Location: Left arm   Patient Position: Sitting   Pulse: 69   Temp: 97 8 °F (36 6 °C)   SpO2: 96%   Weight: 110 kg (242 lb)   Height: 5' 4" (1 626 m)     Body mass index is 41 54 kg/m²  Physical Exam  Constitutional:       General: She is not in acute distress  Appearance: She is well-developed   She is not diaphoretic  HENT:      Head: Normocephalic and atraumatic  Right Ear: External ear normal       Left Ear: External ear normal       Nose: Nose normal       Mouth/Throat:      Pharynx: No oropharyngeal exudate  Eyes:      General: No scleral icterus  Right eye: No discharge  Left eye: No discharge  Conjunctiva/sclera: Conjunctivae normal       Pupils: Pupils are equal, round, and reactive to light  Neck:      Thyroid: No thyromegaly  Cardiovascular:      Rate and Rhythm: Normal rate and regular rhythm  Heart sounds: Normal heart sounds  No murmur heard  No friction rub  No gallop  Pulmonary:      Effort: Pulmonary effort is normal  No respiratory distress  Breath sounds: Normal breath sounds  No wheezing or rales  Abdominal:      General: Bowel sounds are normal  There is no distension  Palpations: Abdomen is soft  Tenderness: There is no abdominal tenderness  Musculoskeletal:         General: No tenderness or deformity  Cervical back: Normal range of motion and neck supple  Lumbar back: Spasms present  Decreased range of motion  Skin:     General: Skin is warm and dry  Neurological:      Mental Status: She is alert and oriented to person, place, and time  Cranial Nerves: No cranial nerve deficit  Psychiatric:         Behavior: Behavior normal          Thought Content:  Thought content normal          Judgment: Judgment normal

## 2022-04-13 DIAGNOSIS — E11.65 TYPE 2 DIABETES MELLITUS WITH HYPERGLYCEMIA, WITHOUT LONG-TERM CURRENT USE OF INSULIN (HCC): Primary | ICD-10-CM

## 2022-04-13 RX ORDER — GLIMEPIRIDE 2 MG/1
2 TABLET ORAL
Qty: 90 TABLET | Refills: 3 | Status: SHIPPED | OUTPATIENT
Start: 2022-04-13 | End: 2022-04-25

## 2022-04-23 DIAGNOSIS — E78.5 HYPERLIPIDEMIA, UNSPECIFIED HYPERLIPIDEMIA TYPE: ICD-10-CM

## 2022-04-23 DIAGNOSIS — E03.9 ACQUIRED HYPOTHYROIDISM: ICD-10-CM

## 2022-04-25 DIAGNOSIS — E11.65 TYPE 2 DIABETES MELLITUS WITH HYPERGLYCEMIA, WITHOUT LONG-TERM CURRENT USE OF INSULIN (HCC): Primary | ICD-10-CM

## 2022-04-25 RX ORDER — LEVOTHYROXINE SODIUM 88 UG/1
88 TABLET ORAL DAILY
Qty: 90 TABLET | Refills: 0 | Status: SHIPPED | OUTPATIENT
Start: 2022-04-25 | End: 2022-07-22 | Stop reason: SDUPTHER

## 2022-04-25 RX ORDER — PRAVASTATIN SODIUM 20 MG
20 TABLET ORAL DAILY
Qty: 90 TABLET | Refills: 0 | Status: SHIPPED | OUTPATIENT
Start: 2022-04-25 | End: 2022-07-22 | Stop reason: SDUPTHER

## 2022-04-29 LAB — COLOGUARD RESULT REPORTABLE: NEGATIVE

## 2022-05-02 ENCOUNTER — TELEPHONE (OUTPATIENT)
Dept: INTERNAL MEDICINE CLINIC | Facility: CLINIC | Age: 67
End: 2022-05-02

## 2022-05-02 DIAGNOSIS — M54.50 CHRONIC BILATERAL LOW BACK PAIN WITHOUT SCIATICA: Primary | ICD-10-CM

## 2022-05-02 DIAGNOSIS — G89.29 CHRONIC BILATERAL LOW BACK PAIN WITHOUT SCIATICA: Primary | ICD-10-CM

## 2022-05-02 NOTE — TELEPHONE ENCOUNTER
Called pt, spoke w/ her about cologaurd results and she mentioned that the shot she got in office at her last visit, didn't really help, so she was asking what you think is the next step

## 2022-05-17 ENCOUNTER — HOSPITAL ENCOUNTER (OUTPATIENT)
Dept: ULTRASOUND IMAGING | Facility: HOSPITAL | Age: 67
Discharge: HOME | End: 2022-05-17
Payer: MEDICARE

## 2022-05-17 DIAGNOSIS — R92.8 ABNORMAL SCREENING MAMMOGRAM: ICD-10-CM

## 2022-05-17 PROCEDURE — 76642 ULTRASOUND BREAST LIMITED: CPT

## 2022-06-27 ENCOUNTER — CONSULT (OUTPATIENT)
Dept: PAIN MEDICINE | Facility: CLINIC | Age: 67
End: 2022-06-27
Payer: MEDICARE

## 2022-06-27 VITALS
DIASTOLIC BLOOD PRESSURE: 78 MMHG | WEIGHT: 237.4 LBS | HEIGHT: 64 IN | TEMPERATURE: 98.6 F | BODY MASS INDEX: 40.53 KG/M2 | HEART RATE: 73 BPM | SYSTOLIC BLOOD PRESSURE: 153 MMHG

## 2022-06-27 DIAGNOSIS — M51.36 LUMBAR DEGENERATIVE DISC DISEASE: ICD-10-CM

## 2022-06-27 DIAGNOSIS — G89.29 CHRONIC BILATERAL LOW BACK PAIN WITHOUT SCIATICA: Primary | ICD-10-CM

## 2022-06-27 DIAGNOSIS — M47.816 LUMBAR SPONDYLOSIS: ICD-10-CM

## 2022-06-27 DIAGNOSIS — M54.50 CHRONIC BILATERAL LOW BACK PAIN WITHOUT SCIATICA: Primary | ICD-10-CM

## 2022-06-27 PROBLEM — M51.369 LUMBAR DEGENERATIVE DISC DISEASE: Status: ACTIVE | Noted: 2022-06-27

## 2022-06-27 PROCEDURE — 99214 OFFICE O/P EST MOD 30 MIN: CPT | Performed by: ANESTHESIOLOGY

## 2022-06-27 NOTE — PATIENT INSTRUCTIONS
Core Strengthening Exercises   WHAT YOU NEED TO KNOW:   What do I need to know about core strengthening exercises? Your core includes the muscles of your lower back, hip, pelvis, and abdomen  Core strengthening exercises help heal and strengthen these muscles  This helps prevent another injury, and keeps your pelvis, spine, and hips in the correct position  What do I need to know about exercise safety? Talk to your healthcare provider before you start an exercise program  A physical therapist can teach you how to do core strengthening exercises safely  Do the exercises on a mat or firm surface  A firm surface will support your spine and prevent low back pain  Do not do these exercises on a bed  Move slowly and smoothly  Avoid fast or jerky motions  Stop if you feel pain  Core exercises should not be painful  Stop if you feel pain  Breathe normally during core exercises  Do not hold your breath  This may cause an increase in blood pressure and prevent muscle strengthening  Your healthcare provider will tell you when to inhale and exhale during the exercise  Begin all of your exercises with abdominal bracing  Abdominal bracing helps warm up your core muscles  You can also practice abdominal bracing throughout the day  Lie on your back with your knees bent and feet flat on the floor  Place your arms in a relaxed position beside your body  Tighten your abdominal muscles  Pull your belly button in and up toward your spine  Hold for 5 seconds  Relax your muscles  Repeat 10 times  How do I perform core strengthening exercises? Your healthcare provider will tell you how often to do these exercises  The provider will also tell you how many repetitions of each exercise you should do  Hold each exercise for 5 seconds or as directed  As you get stronger, increase your hold to 10 to 15 seconds  You can do some of these exercises on a stability ball, or with a weight   Ask your healthcare provider June 28, 2021      Ju Gonzales  [address] [place] [id number]        To Whom It May Concern:    Ju Gonzales  was seen on 6/28/2021.  Please excuse her  until 6/30/2021 due to injury.        Sincerely,        NAIMA Dickens CNP     how to use a stability ball or weight for these exercises:  Bridging:  Lie on your back with your knees bent and feet flat on the floor  Rest your arms at your side  Tighten your buttocks, and then lift your hips 1 inch off the floor  Hold for 5 seconds  When you can do this exercise without pain for 10 seconds, increase the distance you lift your hips  A good goal is to be able to lift your hips so that your shoulders, hips, and knees are in a straight line  Dead bug:  Lie on your back with your knees bent and feet flat on the floor  Place your arms in a relaxed position beside your body  Begin with abdominal bracing  Next, raise one leg, keeping your knee bent  Hold for 5 seconds  Repeat with the other leg  When you can do this exercise without pain for 10 to 15 seconds, you may raise one straight leg and hold  Repeat with the other leg  Quadruped:  Place your hands and knees on the floor  Keep your wrists directly below your shoulders and your knees directly below your hips  Pull your belly button in toward your spine  Do not flatten or arch your back  Tighten your abdominal muscles below your belly button  Hold for 5 seconds  When you can do this exercise without pain for 10 to 15 seconds, you may extend one arm and hold  Repeat on the other side  Side bridge exercises:      Standing side bridge:  Stand next to a wall and extend one arm toward the wall  Place your palm flat on the wall with your fingers pointing upward  Begin with abdominal bracing  Next, without moving your feet, slowly bend your arm to 90 degrees  Hold for 5 seconds  Repeat on the other side  When you can do this exercise without pain for 10 to 15 seconds, you may do the bent leg side bridge on the floor  Bent leg side bridge:  Lie on one side with your legs, hips, and shoulders in a straight line  Prop yourself up onto your forearm so your elbow is directly below your shoulder   Bend your knees back to 90 degrees  Begin with abdominal bracing  Next, lift your hips and balance yourself on your forearm and knees  Hold for 5 seconds  Repeat on the other side  When you can do this exercise without pain for 10 to 15 seconds, you may do the straight leg side bridge on the floor  Straight leg side bridge:  Lie on one side with your legs, hips, and shoulders in a straight line  Prop yourself up onto your forearm so your elbow is directly below your shoulder  Begin with abdominal bracing  Lift your hips off the floor and balance yourself on your forearm and the outside of your flexed foot  Do not let your ankle bend sideways  Hold for 5 seconds  Repeat on the other side  When you can do this exercise without pain for 10 to 15 seconds, ask your healthcare provider for more advanced exercises  Superman:  Lie on your stomach  Extend your arms forward on the floor  Tighten your abdominal muscles and lift your right hand and left leg off the floor  Hold this position  Slowly return to the starting position  Tighten your abdominal muscles and lift your left hand and right leg off the floor  Hold this position  Slowly return to the starting position  Clam:  Lie on your side with your knees bent  Put your bottom arm under your head to keep your neck in line  Put your top hand on your hip to keep your pelvis from moving  Put your heels together, and keep them together during this exercise  Slowly raise your top knee toward the ceiling  Then lower your leg so your knees are together  Repeat this exercise 10 times  Then switch sides and do the exercise 10 times with the other leg  Curl up:  Lie on your back with your knees bent and feet flat on the floor  Place your hands, palms down, underneath your lower back  Next, with your elbows on the floor, lift your shoulders and chest 2 to 3 inches off the floor  Keep your head in line with your shoulders  Hold this position   Slowly return to the starting position  Straight leg raises:  Lie on your back with one leg straight  Bend the other knee and place your foot flat on the floor  Tighten your abdominal muscles  Keep your leg straight and slowly lift it straight up 6 to 12 inches off the floor  Hold this position  Lower your leg slowly  Do as many repetitions as directed on this side  Repeat with the other leg  Plank:  Lie on your stomach  Bend your elbows and place your forearms flat on the floor  Lift your chest, stomach, and knees off the floor  Make sure your elbows are below your shoulders  Your body should be in a straight line  Do not let your hips or lower back sink to the ground  Squeeze your abdominal muscles together and hold for 15 seconds  To make this exercise harder, hold for 30 seconds or lift 1 leg at a time  Bicycles:  Lie on your back  Bend both knees and bring them toward your chest  Your calves should be parallel to the floor  Place the palms of your hands on the back of your head  Straighten your right leg and keep it lifted 2 inches off the floor  Raise your head and shoulders off the floor and twist towards your left  Keep your head and shoulders lifted  Bend your right knee while you straighten your left leg  Keep your left leg 2 inches off the floor  Twist your head and chest towards the left leg  Continue to straighten 1 leg at a time and twist        When should I contact my healthcare provider? You have sharp or worsening pain during exercise or at rest     You have questions or concerns about your condition, care, or exercise program     CARE AGREEMENT:   You have the right to help plan your care  Learn about your health condition and how it may be treated  Discuss treatment options with your healthcare providers to decide what care you want to receive  You always have the right to refuse treatment  The above information is an  only   It is not intended as medical advice for individual conditions or treatments  Talk to your doctor, nurse or pharmacist before following any medical regimen to see if it is safe and effective for you  © Copyright [facility] 2022 Information is for End User's use only and may not be sold, redistributed or otherwise used for commercial purposes   All illustrations and images included in CareNotes® are the copyrighted property of A D A M , Inc  or [address] [place] [address]

## 2022-06-27 NOTE — PROGRESS NOTES
Assessment:  1  Chronic bilateral low back pain without sciatica    2  Lumbar degenerative disc disease        Plan:  Patient is a 25-year-old female with complaints of low back pain occasional leg pain left worse than right presents to office for initial consultation  X-ray showed multilevel degenerative disc changes with facet arthropathy  Patient does report difficulty standing for any prolonged period of time secondary to a dull/aching pressure in the low back often having to bend to alleviate her symptoms  Patient also reports difficulty with sweeping and pushing the broom  1  Patient provided with contact information for Pilates  2  Patient provided with a physical therapy script for lumbar core strengthening exercises   3  MRI lumbar spine order to assess for the discogenic pathology behind patient's low back pain and bilateral leg pain  4  Follow-up in 1 month     History of Present Illness: The patient is a 74 y o  female who presents for consultation in regards to Hip Pain  Symptoms have been present for 1 year  Symptoms began without any precipitating injury or trauma  Pain is reported to be 3 on the numeric rating scale  Symptoms are felt intermittently and worst in the no typical pattern  Symptoms are characterized as dull/aching  Symptoms are associated with no weakness  Aggravating factors include standing and walking  Relieving factors include lying down and sitting  No change in symptoms with kneeling, exercise, turning the head, relaxation, coughing/sneezing and bowel movements  Patient has not tried any pain relieving modalities at this time  Medications to relieve symptoms include naproxen  Review of Systems:    Review of Systems   Constitutional: Negative for fever and unexpected weight change  HENT: Negative for trouble swallowing  Eyes: Negative for visual disturbance  Respiratory: Negative for shortness of breath and wheezing      Cardiovascular: Negative for chest pain and palpitations  Gastrointestinal: Negative for constipation, diarrhea, nausea and vomiting  Endocrine: Negative for cold intolerance, heat intolerance and polydipsia  Genitourinary: Negative for difficulty urinating and frequency  Musculoskeletal: Positive for arthralgias  Negative for gait problem, joint swelling and myalgias  Skin: Negative for rash  Neurological: Negative for dizziness, seizures, syncope, weakness and headaches  Hematological: Does not bruise/bleed easily  Psychiatric/Behavioral: Negative for dysphoric mood  All other systems reviewed and are negative          Past Medical History:   Diagnosis Date    Allergic     Disease of thyroid gland     Hypertension        Past Surgical History:   Procedure Laterality Date    ENDOSCOPIC RELEASE TRANSVERSE CARPAL LIGAMENT OF HAND Right        Family History   Problem Relation Age of Onset    Thyroid disease Mother     Cancer Father     No Known Problems Daughter     No Known Problems Maternal Grandmother     No Known Problems Paternal Grandmother     Breast cancer Paternal Aunt         in her 29's   [address] [place] [address] [place] No Known Problems Paternal Aunt     No Known Problems Paternal Aunt     No Known Problems Paternal [de-identified]        Social History     Occupational History    Occupation: PTA     Comment: Retired   Tobacco Use    Smoking status: Former Smoker     Packs/day: 0 25     Years: 15 00     Pack years: 3 75     Types: Cigarettes    Smokeless tobacco: Never Used   Substance and Sexual Activity    Alcohol use: Yes     Comment: Socially    Drug use: Never    Sexual activity: Not Currently         Current Outpatient Medications:     Cholecalciferol (VITAMIN D3) 50 MCG (2000 UT) TABS, Take 2,000 Units by mouth daily, Disp: , Rfl:     Coenzyme Q10-Fish Oil-Vit E (CO-Q 10 Omega-3 Fish Oil) CAPS, Take by mouth, Disp: , Rfl:     diclofenac (VOLTAREN) 75 mg EC tablet, Take 1 tablet (75 mg total) by mouth 2 (two) times a day (Patient taking differently: Take 75 mg by mouth 2 (two) times a day PRN ), Disp: 60 tablet, Rfl: 2    hydrochlorothiazide (HYDRODIURIL) 12 5 mg tablet, Take 1 tablet (12 5 mg total) by mouth in the morning , Disp: 90 tablet, Rfl: 1    levothyroxine 88 mcg tablet, Take 1 tablet (88 mcg total) by mouth daily, Disp: 90 tablet, Rfl: 0    losartan (COZAAR) 100 MG tablet, Take 1 tablet (100 mg total) by mouth in the morning , Disp: 90 tablet, Rfl: 1    metFORMIN (GLUCOPHAGE) 500 mg tablet, Take 1 tablet (500 mg total) by mouth daily with breakfast, Disp: 30 tablet, Rfl: 5    Multiple Vitamin (MULTIVITAMIN) tablet, Take 1 tablet by mouth daily, Disp: , Rfl:     pravastatin (PRAVACHOL) 20 mg tablet, Take 1 tablet (20 mg total) by mouth daily, Disp: 90 tablet, Rfl: 0    Allergies   Allergen Reactions    Penicillins        Physical Exam:    /78 (BP Location: Right arm, Patient Position: Sitting, Cuff Size: Large)   Pulse 73   Temp 98 6 °F (37 °C)   Ht 5' 4" (1 626 m)   Wt 108 kg (237 lb 6 4 oz)   BMI 40 75 kg/m²     Constitutional: normal, well developed, well nourished, alert, in no distress and non-toxic and no overt pain behavior  and obese  Eyes: anicteric  HEENT: grossly intact  Neck: supple, symmetric, trachea midline and no masses   Pulmonary:even and unlabored  Cardiovascular:No edema or pitting edema present  Skin:Normal without rashes or lesions and well hydrated  Psychiatric:Mood and affect appropriate  Neurologic:Cranial Nerves II-XII grossly intact  Musculoskeletal:antalgic     Lumbar/Sacral Spine examination demonstrates  Decreased range of motion lumbar spine with pain upon: flexion, lateral rotation to the left/right, and bending to the left/right  Bilateral lumbar paraspinals tender to palpation  Muscle spasms noted in the lumbar area bilaterally  4/5 lower extremity strength in all muscle groups bilaterally  Positive seated straight leg raise for bilateral lower extremities    Sensitivity to light touch intact bilateral lower extremities  2+ reflexes in the patella and Achilles  No ankle clonus           Imaging  LUMBAR SPINE     INDICATION:   M54 50: Low back pain, unspecified  G89 29:  Other chronic pain      COMPARISON:  None     VIEWS:  XR SPINE LUMBAR MINIMUM 4 VIEWS NON INJURY        FINDINGS:     There are 5 non rib bearing lumbar vertebral bodies       There is no evidence of acute fracture or destructive osseous lesion      Moderate lumbar scoliosis convex right, apex at L1       Moderate to severe degenerative facet disease throughout the lumbar spine      Moderate degenerative disc space narrowing at L4-L5 and L5-S1      The pedicles appear intact      Soft tissues are unremarkable      IMPRESSION:     No acute osseous abnormality        Scoliosis and degenerative changes as described

## 2022-07-06 ENCOUNTER — HOSPITAL ENCOUNTER (OUTPATIENT)
Dept: MRI IMAGING | Facility: HOSPITAL | Age: 67
Discharge: HOME | End: 2022-07-06
Payer: MEDICARE

## 2022-07-06 DIAGNOSIS — M51.36 LUMBAR DEGENERATIVE DISC DISEASE: ICD-10-CM

## 2022-07-06 DIAGNOSIS — M54.50 CHRONIC BILATERAL LOW BACK PAIN WITHOUT SCIATICA: ICD-10-CM

## 2022-07-06 DIAGNOSIS — G89.29 CHRONIC BILATERAL LOW BACK PAIN WITHOUT SCIATICA: ICD-10-CM

## 2022-07-06 PROCEDURE — G1004 CDSM NDSC: HCPCS

## 2022-07-06 PROCEDURE — 72148 MRI LUMBAR SPINE W/O DYE: CPT

## 2022-07-22 DIAGNOSIS — E78.5 HYPERLIPIDEMIA, UNSPECIFIED HYPERLIPIDEMIA TYPE: ICD-10-CM

## 2022-07-22 DIAGNOSIS — E11.65 TYPE 2 DIABETES MELLITUS WITH HYPERGLYCEMIA, WITHOUT LONG-TERM CURRENT USE OF INSULIN (HCC): ICD-10-CM

## 2022-07-22 DIAGNOSIS — E03.9 ACQUIRED HYPOTHYROIDISM: ICD-10-CM

## 2022-07-22 RX ORDER — LEVOTHYROXINE SODIUM 88 UG/1
88 TABLET ORAL DAILY
Qty: 90 TABLET | Refills: 0 | Status: SHIPPED | OUTPATIENT
Start: 2022-07-22 | End: 2022-10-24 | Stop reason: SDUPTHER

## 2022-07-22 RX ORDER — PRAVASTATIN SODIUM 20 MG
20 TABLET ORAL DAILY
Qty: 90 TABLET | Refills: 0 | Status: SHIPPED | OUTPATIENT
Start: 2022-07-22 | End: 2022-10-24 | Stop reason: SDUPTHER

## 2022-08-12 ENCOUNTER — RA CDI HCC (OUTPATIENT)
Dept: OTHER | Facility: HOSPITAL | Age: 67
End: 2022-08-12

## 2022-08-12 NOTE — PROGRESS NOTES
[address]  coding opportunities       Chart reviewed, no opportunity found: CHART REVIEWED, NO OPPORTUNITY FOUND        Patients Insurance     Medicare Insurance: Medicare

## 2022-08-14 DIAGNOSIS — I10 ESSENTIAL HYPERTENSION: ICD-10-CM

## 2022-08-15 RX ORDER — HYDROCHLOROTHIAZIDE 12.5 MG/1
12.5 TABLET ORAL DAILY
Qty: 90 TABLET | Refills: 1 | Status: SHIPPED | OUTPATIENT
Start: 2022-08-15

## 2022-08-15 RX ORDER — LOSARTAN POTASSIUM 100 MG/1
100 TABLET ORAL DAILY
Qty: 90 TABLET | Refills: 1 | Status: SHIPPED | OUTPATIENT
Start: 2022-08-15

## 2022-08-25 DIAGNOSIS — E11.65 TYPE 2 DIABETES MELLITUS WITH HYPERGLYCEMIA, WITHOUT LONG-TERM CURRENT USE OF INSULIN (HCC): ICD-10-CM

## 2022-08-31 ENCOUNTER — OFFICE VISIT (OUTPATIENT)
Dept: PAIN MEDICINE | Facility: CLINIC | Age: 67
End: 2022-08-31
Payer: MEDICARE

## 2022-08-31 VITALS
BODY MASS INDEX: 38.58 KG/M2 | HEART RATE: 68 BPM | SYSTOLIC BLOOD PRESSURE: 127 MMHG | DIASTOLIC BLOOD PRESSURE: 72 MMHG | HEIGHT: 64 IN | WEIGHT: 226 LBS

## 2022-08-31 DIAGNOSIS — G89.4 CHRONIC PAIN SYNDROME: Primary | ICD-10-CM

## 2022-08-31 DIAGNOSIS — M51.36 LUMBAR DEGENERATIVE DISC DISEASE: ICD-10-CM

## 2022-08-31 DIAGNOSIS — M54.50 CHRONIC BILATERAL LOW BACK PAIN WITHOUT SCIATICA: ICD-10-CM

## 2022-08-31 DIAGNOSIS — M48.061 SPINAL STENOSIS OF LUMBAR REGION WITHOUT NEUROGENIC CLAUDICATION: ICD-10-CM

## 2022-08-31 DIAGNOSIS — G89.29 CHRONIC BILATERAL LOW BACK PAIN WITHOUT SCIATICA: ICD-10-CM

## 2022-08-31 PROCEDURE — 99214 OFFICE O/P EST MOD 30 MIN: CPT | Performed by: NURSE PRACTITIONER

## 2022-08-31 NOTE — PROGRESS NOTES
Assessment:  1  Chronic pain syndrome    2  Chronic bilateral low back pain without sciatica    3  Lumbar degenerative disc disease    4  Spinal stenosis of lumbar region without neurogenic claudication        Plan:  While the patient was in the office today, I did have a thorough conversation regarding their chronic pain syndrome, medication management, and treatment plan options  Patient is being seen for follow-up visit  She was initially seen here for consultation on 06/27/2022  An MRI of her lumbar spine was ordered  MRI reveals multilevel severe facet arthritis  There is a left-sided disc protrusion at L1-2 resulting in left lateral recess stenosis potentially impacting the left L2 nerve root  There is prominent epidural fat dorsally in the spinal canal at L2-3 and L3-4 contributing to central canal stenosis which is moderate to severe  There is spondylotic degenerative disc disease at L4-5 resulting in severe central stenosis and compression of the thecal sac  There is severe right lateral recess stenosis and right foraminal narrowing  There is severe left foraminal narrowing at L5-S1 related to degenerative disc disease  MRI results were reviewed with patient during today's visit  Patient states that her pain has improved from a combination of lumbar core strengthening exercises that she does at home and weight loss  She uses diclofenac on a strictly as needed basis  Will consider interventional therapy in the future if her pain worsens  Will follow-up in 3 months  Patient will call sooner if her pain worsens  History of Present Illness: The patient is a 74 y o  female who presents for a follow up office visit in regards to Back Pain (Follow-up)  The patients current symptoms include complaints of right-sided low back pain, nonradiating  Current pain level is a 0-4/10  Quality pain is described as dull and aching      Current pain medications includes:  Diclofenac 75 mg as needed   The patient reports that this regimen is providing 25 % pain relief  The patient is reporting no side effects from this pain medication regimen  I have personally reviewed and/or updated the patient's past medical history, past surgical history, family history, social history, current medications, allergies, and vital signs today  Review of Systems  Review of Systems   Constitutional: Negative for chills and fever  HENT: Negative for ear pain and sore throat  Eyes: Negative for pain and visual disturbance  Respiratory: Negative for cough and shortness of breath  Cardiovascular: Negative for chest pain and palpitations  Gastrointestinal: Negative for abdominal pain and vomiting  Genitourinary: Negative for dysuria and hematuria  Musculoskeletal: Negative for arthralgias and back pain  Joint stiffness   Skin: Negative for color change and rash  Neurological: Negative for seizures and syncope  All other systems reviewed and are negative          Past Medical History:   Diagnosis Date    Allergic     Disease of thyroid gland     Hypertension        Past Surgical History:   Procedure Laterality Date    ENDOSCOPIC RELEASE TRANSVERSE CARPAL LIGAMENT OF HAND Right        Family History   Problem Relation Age of Onset    Thyroid disease Mother     Cancer Father     No Known Problems Daughter     No Known Problems Maternal Grandmother     No Known Problems Paternal Grandmother     Breast cancer Paternal Aunt         in her 29's   [place] No Known Problems Paternal Aunt     No Known Problems Paternal Aunt     No Known Problems Paternal [de-identified]        Social History     Occupational History    Occupation: PTA     Comment: Retired   Tobacco Use    Smoking status: Former Smoker     Packs/day: 0 25     Years: 15 00     Pack years: 3 75     Types: Cigarettes    Smokeless tobacco: Never Used   Substance and Sexual Activity    Alcohol use: Yes     Comment: Socially    Drug use: Never    Sexual activity: Not Currently         Current Outpatient Medications:     Cholecalciferol (VITAMIN D3) 50 MCG (2000 UT) TABS, Take 2,000 Units by mouth daily, Disp: , Rfl:     Coenzyme Q10-Fish Oil-Vit E (CO-Q 10 Omega-3 Fish Oil) CAPS, Take by mouth, Disp: , Rfl:     diclofenac (VOLTAREN) 75 mg EC tablet, Take 1 tablet (75 mg total) by mouth 2 (two) times a day (Patient taking differently: Take 75 mg by mouth 2 (two) times a day PRN), Disp: 60 tablet, Rfl: 2    hydrochlorothiazide (HYDRODIURIL) 12 5 mg tablet, Take 1 tablet (12 5 mg total) by mouth daily, Disp: 90 tablet, Rfl: 1    levothyroxine 88 mcg tablet, Take 1 tablet (88 mcg total) by mouth daily, Disp: 90 tablet, Rfl: 0    losartan (COZAAR) 100 MG tablet, Take 1 tablet (100 mg total) by mouth daily, Disp: 90 tablet, Rfl: 1    metFORMIN (GLUCOPHAGE) 500 mg tablet, Take 1 tablet (500 mg total) by mouth daily with breakfast, Disp: 30 tablet, Rfl: 5    Multiple Vitamin (MULTIVITAMIN) tablet, Take 1 tablet by mouth daily, Disp: , Rfl:     pravastatin (PRAVACHOL) 20 mg tablet, Take 1 tablet (20 mg total) by mouth daily, Disp: 90 tablet, Rfl: 0    Allergies   Allergen Reactions    Penicillins        Physical Exam:    /72 (BP Location: Left arm, Patient Position: Sitting, Cuff Size: Large)   Pulse 68   Ht 5' 4" (1 626 m)   Wt 103 kg (226 lb)   BMI 38 79 kg/m²     Constitutional:normal, well developed, well nourished, alert, in no distress and non-toxic and no overt pain behavior  Eyes:anicteric  HEENT:grossly intact  Neck:supple, symmetric, trachea midline and no masses   Pulmonary:even and unlabored  Cardiovascular:No edema or pitting edema present  Skin:Normal without rashes or lesions and well hydrated  Psychiatric:Mood and affect appropriate  Neurologic:Cranial Nerves II-XII grossly intact  Musculoskeletal:normal    Imaging  No orders to display       No orders of the defined types were placed in this encounter

## 2022-09-06 ENCOUNTER — OFFICE VISIT (OUTPATIENT)
Dept: INTERNAL MEDICINE CLINIC | Facility: CLINIC | Age: 67
End: 2022-09-06
Payer: MEDICARE

## 2022-09-06 ENCOUNTER — APPOINTMENT (OUTPATIENT)
Dept: LAB | Facility: CLINIC | Age: 67
End: 2022-09-06
Payer: MEDICARE

## 2022-09-06 VITALS
HEART RATE: 67 BPM | HEIGHT: 64 IN | DIASTOLIC BLOOD PRESSURE: 72 MMHG | OXYGEN SATURATION: 97 % | WEIGHT: 227.5 LBS | BODY MASS INDEX: 38.84 KG/M2 | TEMPERATURE: 97.6 F | SYSTOLIC BLOOD PRESSURE: 142 MMHG

## 2022-09-06 DIAGNOSIS — E11.65 TYPE 2 DIABETES MELLITUS WITH HYPERGLYCEMIA, WITHOUT LONG-TERM CURRENT USE OF INSULIN (HCC): Primary | ICD-10-CM

## 2022-09-06 DIAGNOSIS — Z00.00 MEDICARE ANNUAL WELLNESS VISIT, SUBSEQUENT: ICD-10-CM

## 2022-09-06 DIAGNOSIS — E78.5 HYPERLIPIDEMIA, UNSPECIFIED HYPERLIPIDEMIA TYPE: ICD-10-CM

## 2022-09-06 DIAGNOSIS — E66.01 OBESITY, MORBID (HCC): ICD-10-CM

## 2022-09-06 DIAGNOSIS — E03.9 ACQUIRED HYPOTHYROIDISM: ICD-10-CM

## 2022-09-06 DIAGNOSIS — Z13.0 SCREENING FOR DEFICIENCY ANEMIA: ICD-10-CM

## 2022-09-06 DIAGNOSIS — E55.9 VITAMIN D DEFICIENCY: ICD-10-CM

## 2022-09-06 DIAGNOSIS — I10 ESSENTIAL HYPERTENSION: ICD-10-CM

## 2022-09-06 DIAGNOSIS — E11.65 TYPE 2 DIABETES MELLITUS WITH HYPERGLYCEMIA, WITHOUT LONG-TERM CURRENT USE OF INSULIN (HCC): ICD-10-CM

## 2022-09-06 LAB
25(OH)D3 SERPL-MCNC: 38.4 NG/ML (ref 30–100)
ALBUMIN SERPL BCP-MCNC: 3.8 G/DL (ref 3.5–5)
ALP SERPL-CCNC: 75 U/L (ref 46–116)
ALT SERPL W P-5'-P-CCNC: 28 U/L (ref 12–78)
ANION GAP SERPL CALCULATED.3IONS-SCNC: 3 MMOL/L (ref 4–13)
AST SERPL W P-5'-P-CCNC: 14 U/L (ref 5–45)
BASOPHILS # BLD AUTO: 0.04 THOUSANDS/ΜL (ref 0–0.1)
BASOPHILS NFR BLD AUTO: 1 % (ref 0–1)
BILIRUB SERPL-MCNC: 0.88 MG/DL (ref 0.2–1)
BUN SERPL-MCNC: 10 MG/DL (ref 5–25)
CALCIUM SERPL-MCNC: 9.8 MG/DL (ref 8.3–10.1)
CHLORIDE SERPL-SCNC: 107 MMOL/L (ref 96–108)
CHOLEST SERPL-MCNC: 168 MG/DL
CO2 SERPL-SCNC: 28 MMOL/L (ref 21–32)
CREAT SERPL-MCNC: 0.77 MG/DL (ref 0.6–1.3)
CREAT UR-MCNC: 89.5 MG/DL
EOSINOPHIL # BLD AUTO: 0.14 THOUSAND/ΜL (ref 0–0.61)
EOSINOPHIL NFR BLD AUTO: 2 % (ref 0–6)
ERYTHROCYTE [DISTWIDTH] IN BLOOD BY AUTOMATED COUNT: 13 % (ref 11.6–15.1)
GFR SERPL CREATININE-BSD FRML MDRD: 79 ML/MIN/1.73SQ M
GLUCOSE P FAST SERPL-MCNC: 129 MG/DL (ref 65–99)
HCT VFR BLD AUTO: 42.9 % (ref 34.8–46.1)
HDLC SERPL-MCNC: 52 MG/DL
HGB BLD-MCNC: 14 G/DL (ref 11.5–15.4)
IMM GRANULOCYTES # BLD AUTO: 0.02 THOUSAND/UL (ref 0–0.2)
IMM GRANULOCYTES NFR BLD AUTO: 0 % (ref 0–2)
LDLC SERPL CALC-MCNC: 83 MG/DL (ref 0–100)
LYMPHOCYTES # BLD AUTO: 2.2 THOUSANDS/ΜL (ref 0.6–4.47)
LYMPHOCYTES NFR BLD AUTO: 35 % (ref 14–44)
MCH RBC QN AUTO: 31.5 PG (ref 26.8–34.3)
MCHC RBC AUTO-ENTMCNC: 32.6 G/DL (ref 31.4–37.4)
MCV RBC AUTO: 96 FL (ref 82–98)
MICROALBUMIN UR-MCNC: 5.7 MG/L (ref 0–20)
MICROALBUMIN/CREAT 24H UR: 6 MG/G CREATININE (ref 0–30)
MONOCYTES # BLD AUTO: 0.63 THOUSAND/ΜL (ref 0.17–1.22)
MONOCYTES NFR BLD AUTO: 10 % (ref 4–12)
NEUTROPHILS # BLD AUTO: 3.24 THOUSANDS/ΜL (ref 1.85–7.62)
NEUTS SEG NFR BLD AUTO: 52 % (ref 43–75)
NONHDLC SERPL-MCNC: 116 MG/DL
NRBC BLD AUTO-RTO: 0 /100 WBCS
PLATELET # BLD AUTO: 269 THOUSANDS/UL (ref 149–390)
PMV BLD AUTO: 10.2 FL (ref 8.9–12.7)
POTASSIUM SERPL-SCNC: 3.9 MMOL/L (ref 3.5–5.3)
PROT SERPL-MCNC: 7.4 G/DL (ref 6.4–8.4)
RBC # BLD AUTO: 4.45 MILLION/UL (ref 3.81–5.12)
SL AMB POCT HEMOGLOBIN AIC: 6.3 (ref ?–6.5)
SODIUM SERPL-SCNC: 138 MMOL/L (ref 135–147)
TRIGL SERPL-MCNC: 166 MG/DL
TSH SERPL DL<=0.05 MIU/L-ACNC: 2.85 UIU/ML (ref 0.45–4.5)
WBC # BLD AUTO: 6.27 THOUSAND/UL (ref 4.31–10.16)

## 2022-09-06 PROCEDURE — 83036 HEMOGLOBIN GLYCOSYLATED A1C: CPT | Performed by: PHYSICIAN ASSISTANT

## 2022-09-06 PROCEDURE — 99213 OFFICE O/P EST LOW 20 MIN: CPT | Performed by: PHYSICIAN ASSISTANT

## 2022-09-06 PROCEDURE — 82043 UR ALBUMIN QUANTITATIVE: CPT | Performed by: PHYSICIAN ASSISTANT

## 2022-09-06 PROCEDURE — 82306 VITAMIN D 25 HYDROXY: CPT

## 2022-09-06 PROCEDURE — 84443 ASSAY THYROID STIM HORMONE: CPT

## 2022-09-06 PROCEDURE — 2028F FOOT EXAM PERFORMED: CPT | Performed by: PHYSICIAN ASSISTANT

## 2022-09-06 PROCEDURE — 85025 COMPLETE CBC W/AUTO DIFF WBC: CPT

## 2022-09-06 PROCEDURE — 82570 ASSAY OF URINE CREATININE: CPT | Performed by: PHYSICIAN ASSISTANT

## 2022-09-06 PROCEDURE — 36415 COLL VENOUS BLD VENIPUNCTURE: CPT

## 2022-09-06 PROCEDURE — G0439 PPPS, SUBSEQ VISIT: HCPCS | Performed by: PHYSICIAN ASSISTANT

## 2022-09-06 PROCEDURE — 80061 LIPID PANEL: CPT

## 2022-09-06 PROCEDURE — 80053 COMPREHEN METABOLIC PANEL: CPT

## 2022-09-06 NOTE — PROGRESS NOTES
Assessment and Plan:     Problem List Items Addressed This Visit        Endocrine    Acquired hypothyroidism    Relevant Orders    TSH, 3rd generation (Completed)    Type 2 diabetes mellitus with hyperglycemia, without long-term current use of insulin ([facility] [address] ) - Primary       Lab Results   Component Value Date    HGBA1C 6 3 09/06/2022       Pts symptoms are stable with current regime  No changes at present  Relevant Orders    Comprehensive metabolic panel (Completed)    Microalbumin / creatinine urine ratio (Completed)    POCT hemoglobin A1c (Completed)       Cardiovascular and Mediastinum    Essential hypertension     Pts symptoms are stable with current regime  No changes at present  Other    Hyperlipidemia    Relevant Orders    Lipid panel (Completed)    Obesity, morbid ([facility] [address] )      Other Visit Diagnoses     Medicare annual wellness visit, subsequent        Screening for deficiency anemia        Relevant Orders    CBC and differential (Completed)    Vitamin D deficiency        Relevant Orders    Vitamin D 25 hydroxy (Completed)          Urinary Incontinence Plan of Care: counseling topics discussed: use restroom every 2 hours and limiting fluid intake to 60 oz  per day  Preventive health issues were discussed with patient, and age appropriate screening tests were ordered as noted in patient's After Visit Summary  Personalized health advice and appropriate referrals for health education or preventive services given if needed, as noted in patient's After Visit Summary  History of Present Illness:     Patient presents for a Medicare Wellness Visit    Pt presents for routine visit  Subsequent AWV also performed  She is up to date with CRC Screening  She is due for labs and dexa scan  Foot exam performed  Eye exam is due  She is due for prevnar 20, shingrix and flu vaccine but she defers today as she would like to do this with her  at the pharmacy   A1C today remains nicely controlled at 6 3 and she has lost weight also  She notes her back pain has been stable with current regime and she requires no further intervention at this time  Patient Care Team:  Arabella Ortega PA-C as PCP - General (Internal Medicine)     Review of Systems:     Review of Systems   Constitutional: Negative for chills and fever  HENT: Negative for congestion, ear pain, hearing loss, postnasal drip, rhinorrhea, sinus pressure, sinus pain, sore throat and trouble swallowing  Eyes: Negative for pain and visual disturbance  Respiratory: Negative for cough, chest tightness, shortness of breath and wheezing  Cardiovascular: Negative  Negative for chest pain, palpitations and leg swelling  Gastrointestinal: Negative for abdominal pain, blood in stool, constipation, diarrhea, nausea and vomiting  Endocrine: Negative for cold intolerance, heat intolerance, polydipsia, polyphagia and polyuria  Genitourinary: Negative for difficulty urinating, dysuria, flank pain and urgency  Musculoskeletal: Negative for arthralgias, back pain, gait problem and myalgias  Skin: Negative for rash  Allergic/Immunologic: Negative  Neurological: Negative for dizziness, weakness, light-headedness and headaches  Hematological: Negative  Psychiatric/Behavioral: Negative for behavioral problems, dysphoric mood and sleep disturbance  The patient is not nervous/anxious           Problem List:     Patient Active Problem List   Diagnosis    Acquired hypothyroidism    Essential hypertension    Hyperlipidemia    Obesity, morbid ([address] )    Atrophy of muscle of right hand    Right wrist pain    Type 2 diabetes mellitus with hyperglycemia, without long-term current use of insulin (HCC)    Allergic contact dermatitis due to plants, except food    Chronic pain of right knee    Chronic bilateral low back pain without sciatica    Lumbar degenerative disc disease    Chronic pain syndrome    Spinal stenosis of lumbar region without neurogenic claudication      Past Medical and Surgical History:     Past Medical History:   Diagnosis Date    Allergic     Disease of thyroid gland     Hypertension      Past Surgical History:   Procedure Laterality Date    ENDOSCOPIC RELEASE TRANSVERSE CARPAL LIGAMENT OF HAND Right       Family History:     Family History   Problem Relation Age of Onset    Thyroid disease Mother     Cancer Father     No Known Problems Daughter     No Known Problems Maternal Grandmother     No Known Problems Paternal Grandmother     Breast cancer Paternal Aunt         in her 29's   [place] No Known Problems Paternal Aunt     No Known Problems Paternal Aunt     No Known Problems Paternal Aunt       Social History:     Social History     Socioeconomic History    Marital status: /Civil Union     Spouse name: None    Number of children: None    Years of education: None    Highest education level: None   Occupational History    Occupation: PTA     Comment: Retired   Tobacco Use    Smoking status: Former Smoker     Packs/day: 0 25     Years: 15 00     Pack years: 3 75     Types: Cigarettes    Smokeless tobacco: Never Used   Substance and Sexual Activity    Alcohol use: Yes     Comment: Socially    Drug use: Never    Sexual activity: Not Currently   Other Topics Concern    None   Social History Narrative    None     Social Determinants of Health     Financial Resource Strain: Low Risk     Difficulty of Paying Living Expenses: Not very hard   Food Insecurity: Not on file   Transportation Needs: No Transportation Needs    Lack of Transportation (Medical): No    Lack of Transportation (Non-Medical):  No   Physical Activity: Not on file   Stress: Not on file   Social Connections: Not on file   Intimate Partner Violence: Not on file   Housing Stability: Not on file      Medications and Allergies:     Current Outpatient Medications   Medication Sig Dispense Refill    Cholecalciferol (VITAMIN D3) 50 MCG (2000 UT) TABS Take 2,000 Units by mouth daily      Coenzyme Q10-Fish Oil-Vit E (CO-Q 10 Omega-3 Fish Oil) CAPS Take by mouth      diclofenac (VOLTAREN) 75 mg EC tablet Take 1 tablet (75 mg total) by mouth 2 (two) times a day (Patient taking differently: Take 75 mg by mouth 2 (two) times a day PRN) 60 tablet 2    hydrochlorothiazide (HYDRODIURIL) 12 5 mg tablet Take 1 tablet (12 5 mg total) by mouth daily 90 tablet 1    levothyroxine 88 mcg tablet Take 1 tablet (88 mcg total) by mouth daily 90 tablet 0    losartan (COZAAR) 100 MG tablet Take 1 tablet (100 mg total) by mouth daily 90 tablet 1    metFORMIN (GLUCOPHAGE) 500 mg tablet Take 1 tablet (500 mg total) by mouth daily with breakfast 30 tablet 5    Multiple Vitamin (MULTIVITAMIN) tablet Take 1 tablet by mouth daily      pravastatin (PRAVACHOL) 20 mg tablet Take 1 tablet (20 mg total) by mouth daily 90 tablet 0     No current facility-administered medications for this visit  Allergies   Allergen Reactions    Penicillins       Immunizations:     Immunization History   Administered Date(s) Administered    COVID-19 MODERNA VACC 0 5 ML IM 01/29/2021, 03/03/2021, 10/23/2021, 03/31/2022    INFLUENZA 10/16/2019, 09/10/2020, 09/02/2021    Tdap 01/15/2018      Health Maintenance:         Topic Date Due    Hepatitis C Screening  Never done    Breast Cancer Screening: Mammogram  04/09/2023    Colorectal Cancer Screening  04/25/2025         Topic Date Due    Pneumococcal Vaccine: 65+ Years (1 - PCV) Never done    Influenza Vaccine (1) 09/01/2022      Medicare Screening Tests and Risk Assessments:     Heath Montez is here for her Subsequent Wellness visit  Last Medicare Wellness visit information reviewed, patient interviewed and updates made to the record today  Health Risk Assessment:   Patient rates overall health as good  Patient feels that their physical health rating is slightly better  Patient is satisfied with their life   Eyesight was rated as same  Hearing was rated as same  Patient feels that their emotional and mental health rating is same  Patients states they are never, rarely angry  Patient states they are never, rarely unusually tired/fatigued  Pain experienced in the last 7 days has been some  Patient's pain rating has been 4/10  Patient states that she has experienced no weight loss or gain in last 6 months  Trying to lose    Depression Screening:   PHQ-2 Score: 0      Fall Risk Screening: In the past year, patient has experienced: no history of falling in past year      Urinary Incontinence Screening:   Patient has leaked urine accidently in the last six months  Home Safety:  Patient does not have trouble with stairs inside or outside of their home  Patient has working smoke alarms and has no working carbon monoxide detector  Home safety hazards include: none  Nutrition:   Current diet is Regular  Intermittent fasting 16, 8 hr schedule    Medications:   Patient is currently taking over-the-counter supplements  OTC medications include: CoQ 10 , Vit D (both Dr  Recommended) and centrum silver multivitamin for women  Patient is able to manage medications  Activities of Daily Living (ADLs)/Instrumental Activities of Daily Living (IADLs):   Walk and transfer into and out of bed and chair?: Yes  Dress and groom yourself?: Yes    Bathe or shower yourself?: Yes    Feed yourself?  Yes  Do your laundry/housekeeping?: Yes  Manage your money, pay your bills and track your expenses?: Yes  Make your own meals?: Yes    Do your own shopping?: Yes    Previous Hospitalizations:   Any hospitalizations or ED visits within the last 12 months?: No      Advance Care Planning:   Living will: No    Durable POA for healthcare: No    Advanced directive: No      PREVENTIVE SCREENINGS      Cardiovascular Screening:    General: Screening Not Indicated and History Lipid Disorder      Diabetes Screening:     General: Screening Not Indicated and History Diabetes      Colorectal Cancer Screening:     General: Screening Current      Breast Cancer Screening:     General: Screening Current      Cervical Cancer Screening:    General: Screening Not Indicated      Lung Cancer Screening:     General: Screening Not Indicated    Screening, Brief Intervention, and Referral to Treatment (SBIRT)    Screening  Typical number of drinks in a day: 0  Typical number of drinks in a week: 1  Interpretation: Low risk drinking behavior  AUDIT-C Screenin) How often did you have a drink containing alcohol in the past year? 2 to 4 times a month  2) How many drinks did you have on a typical day when you were drinking in the past year? 1 to 2  3) How often did you have 6 or more drinks on one occasion in the past year? never    AUDIT-C Score: 2  Interpretation: Score 0-2 (female): Negative screen for alcohol misuse    Single Item Drug Screening:  How often have you used an illegal drug (including marijuana) or a prescription medication for non-medical reasons in the past year? never    Single Item Drug Screen Score: 0  Interpretation: Negative screen for possible drug use disorder    No exam data present     Physical Exam:     /72 (BP Location: Left arm, Patient Position: Sitting)   Pulse 67   Temp 97 6 °F (36 4 °C)   Ht 5' 4" (1 626 m)   Wt 103 kg (227 lb 8 oz)   SpO2 97%   BMI 39 05 kg/m²     Physical Exam  Constitutional:       Appearance: She is well-developed  HENT:      Head: Normocephalic and atraumatic  Right Ear: External ear normal       Left Ear: External ear normal       Nose: Nose normal    Eyes:      Conjunctiva/sclera: Conjunctivae normal    Cardiovascular:      Rate and Rhythm: Normal rate and regular rhythm  Pulses: no weak pulses          Dorsalis pedis pulses are 2+ on the right side and 2+ on the left side  Posterior tibial pulses are 2+ on the right side and 2+ on the left side  Heart sounds: Normal heart sounds     Pulmonary: Effort: Pulmonary effort is normal       Breath sounds: Normal breath sounds  Abdominal:      General: Bowel sounds are normal       Palpations: Abdomen is soft  Musculoskeletal:         General: Normal range of motion  Cervical back: Normal range of motion and neck supple  Lumbar back: Spasms present  Feet:      Right foot:      Skin integrity: No ulcer, skin breakdown, erythema, warmth, callus or dry skin  Left foot:      Skin integrity: No ulcer, skin breakdown, erythema, warmth, callus or dry skin  Skin:     General: Skin is warm and dry  Neurological:      Mental Status: She is alert and oriented to person, place, and time  Psychiatric:         Behavior: Behavior normal          Thought Content: Thought content normal          Judgment: Judgment normal         Patient's shoes and socks removed  Right Foot/Ankle   Right Foot Inspection  Skin Exam: skin normal and skin intact  No dry skin, no warmth, no callus, no erythema, no maceration, no abnormal color, no pre-ulcer, no ulcer and no callus  Toe Exam: ROM and strength within normal limits  Sensory   Vibration: intact  Proprioception: intact  Monofilament testing: intact    Vascular  Capillary refills: < 3 seconds  The right DP pulse is 2+  The right PT pulse is 2+  Left Foot/Ankle  Left Foot Inspection  Skin Exam: skin normal and skin intact  No dry skin, no warmth, no erythema, no maceration, normal color, no pre-ulcer, no ulcer and no callus  Toe Exam: ROM and strength within normal limits  Sensory   Vibration: intact  Proprioception: intact  Monofilament testing: intact    Vascular  Capillary refills: < 3 seconds  The left DP pulse is 2+  The left PT pulse is 2+       Assign Risk Category  No deformity present  No loss of protective sensation  No weak pulses  Risk: 0        Benita Santos PA-C

## 2022-09-06 NOTE — PATIENT INSTRUCTIONS
Medicare Preventive Visit Patient Instructions  Thank you for completing your Welcome to Medicare Visit or Medicare Annual Wellness Visit today  Your next wellness visit will be due in one year (9/7/2023)  The screening/preventive services that you may require over the next 5-10 years are detailed below  Some tests may not apply to you based off risk factors and/or age  Screening tests ordered at today's visit but not completed yet may show as past due  Also, please note that scanned in results may not display below  Preventive Screenings:  Service Recommendations Previous Testing/Comments   Colorectal Cancer Screening  * Colonoscopy    * Fecal Occult Blood Test (FOBT)/Fecal Immunochemical Test (FIT)  * Fecal DNA/Cologuard Test  * Flexible Sigmoidoscopy Age: 39-70 years old   Colonoscopy: every 10 years (may be performed more frequently if at higher risk)  OR  FOBT/FIT: every 1 year  OR  Cologuard: every 3 years  OR  Sigmoidoscopy: every 5 years  Screening may be recommended earlier than age 39 if at higher risk for colorectal cancer  Also, an individualized decision between you and your healthcare provider will decide whether screening between the ages of 74-80 would be appropriate  Colonoscopy: 04/25/2022  FOBT/FIT: Not on file  Cologuard: 04/25/2022  Sigmoidoscopy: Not on file    Screening Current     Breast Cancer Screening Age: 36 years old  Frequency: every 1-2 years  Not required if history of left and right mastectomy Mammogram: 04/09/2022    Screening Current   Cervical Cancer Screening Between the ages of 21-29, pap smear recommended once every 3 years  Between the ages of 33-67, can perform pap smear with HPV co-testing every 5 years     Recommendations may differ for women with a history of total hysterectomy, cervical cancer, or abnormal pap smears in past  Pap Smear: Not on file    Screening Not Indicated   Hepatitis C Screening Once for adults born between 1945 and 1965  More frequently in patients at high risk for Hepatitis C Hep C Antibody: Not on file        Diabetes Screening 1-2 times per year if you're at risk for diabetes or have pre-diabetes Fasting glucose: 137 mg/dL (8/10/2021)  A1C: 6 8 (4/12/2022)  Screening Not Indicated  History Diabetes   Cholesterol Screening Once every 5 years if you don't have a lipid disorder  May order more often based on risk factors  Lipid panel: 08/10/2021    Screening Not Indicated  History Lipid Disorder     Other Preventive Screenings Covered by Medicare:  1  Abdominal Aortic Aneurysm (AAA) Screening: covered once if your at risk  You're considered to be at risk if you have a family history of AAA  2  Lung Cancer Screening: covers low dose CT scan once per year if you meet all of the following conditions: (1) Age 50-69; (2) No signs or symptoms of lung cancer; (3) Current smoker or have quit smoking within the last 15 years; (4) You have a tobacco smoking history of at least 20 pack years (packs per day multiplied by number of years you smoked); (5) You get a written order from a healthcare provider  3  Glaucoma Screening: covered annually if you're considered high risk: (1) You have diabetes OR (2) Family history of glaucoma OR (3)  aged 48 and older OR (3)  American aged 72 and older  3  Osteoporosis Screening: covered every 2 years if you meet one of the following conditions: (1) You're estrogen deficient and at risk for osteoporosis based off medical history and other findings; (2) Have a vertebral abnormality; (3) On glucocorticoid therapy for more than 3 months; (4) Have primary hyperparathyroidism; (5) On osteoporosis medications and need to assess response to drug therapy  · Last bone density test (DXA Scan): Not on file  5  HIV Screening: covered annually if you're between the age of 12-76  Also covered annually if you are younger than 13 and older than 72 with risk factors for HIV infection   For pregnant patients, it is covered up to 3 times per pregnancy  Immunizations:  Immunization Recommendations   Influenza Vaccine Annual influenza vaccination during flu season is recommended for all persons aged >= 6 months who do not have contraindications   Pneumococcal Vaccine   * Pneumococcal conjugate vaccine = PCV13 (Prevnar 13), PCV15 (Vaxneuvance), PCV20 (Prevnar 20)  * Pneumococcal polysaccharide vaccine = PPSV23 (Pneumovax) Adults 25-60 years old: 1-3 doses may be recommended based on certain risk factors  Adults 72 years old: 1-2 doses may be recommended based off what pneumonia vaccine you previously received   Hepatitis B Vaccine 3 dose series if at intermediate or high risk (ex: diabetes, end stage renal disease, liver disease)   Tetanus (Td) Vaccine - COST NOT COVERED BY MEDICARE PART B Following completion of primary series, a booster dose should be given every 10 years to maintain immunity against tetanus  Td may also be given as tetanus wound prophylaxis  Tdap Vaccine - COST NOT COVERED BY MEDICARE PART B Recommended at least once for all adults  For pregnant patients, recommended with each pregnancy  Shingles Vaccine (Shingrix) - COST NOT COVERED BY MEDICARE PART B  2 shot series recommended in those aged 48 and above     Health Maintenance Due:      Topic Date Due    Hepatitis C Screening  Never done    Breast Cancer Screening: Mammogram  04/09/2023    Colorectal Cancer Screening  04/25/2025     Immunizations Due:      Topic Date Due    Pneumococcal Vaccine: 65+ Years (1 - PCV) Never done    Influenza Vaccine (1) 09/01/2022     Advance Directives   What are advance directives? Advance directives are legal documents that state your wishes and plans for medical care  These plans are made ahead of time in case you lose your ability to make decisions for yourself  Advance directives can apply to any medical decision, such as the treatments you want, and if you want to donate organs     What are the types of advance directives? There are many types of advance directives, and each state has rules about how to use them  You may choose a combination of any of the following:  · Living will: This is a written record of the treatment you want  You can also choose which treatments you do not want, which to limit, and which to stop at a certain time  This includes surgery, medicine, IV fluid, and tube feedings  · Durable power of  for healthcare [facility]): This is a written record that states who you want to make healthcare choices for you when you are unable to make them for yourself  This person, called a proxy, is usually a family member or a friend  You may choose more than 1 proxy  · Do not resuscitate (DNR) order:  A DNR order is used in case your heart stops beating or you stop breathing  It is a request not to have certain forms of treatment, such as CPR  A DNR order may be included in other types of advance directives  · Medical directive: This covers the care that you want if you are in a coma, near death, or unable to make decisions for yourself  You can list the treatments you want for each condition  Treatment may include pain medicine, surgery, blood transfusions, dialysis, IV or tube feedings, and a ventilator (breathing machine)  · Values history: This document has questions about your views, beliefs, and how you feel and think about life  This information can help others choose the care that you would choose  Why are advance directives important? An advance directive helps you control your care  Although spoken wishes may be used, it is better to have your wishes written down  Spoken wishes can be misunderstood, or not followed  Treatments may be given even if you do not want them  An advance directive may make it easier for your family to make difficult choices about your care  Urinary Incontinence   Urinary incontinence (UI)  is when you lose control of your bladder   UI develops because your bladder cannot store or empty urine properly  The 3 most common types of UI are stress incontinence, urge incontinence, or both  Medicines:   · May be given to help strengthen your bladder control  Report any side effects of medication to your healthcare provider  Do pelvic muscle exercises often:  Your pelvic muscles help you stop urinating  Squeeze these muscles tight for 5 seconds, then relax for 5 seconds  Gradually work up to squeezing for 10 seconds  Do 3 sets of 15 repetitions a day, or as directed  This will help strengthen your pelvic muscles and improve bladder control  Train your bladder:  Go to the bathroom at set times, such as every 2 hours, even if you do not feel the urge to go  You can also try to hold your urine when you feel the urge to go  For example, hold your urine for 5 minutes when you feel the urge to go  As that becomes easier, hold your urine for 10 minutes  Self-care:   · Keep a UI record  Write down how often you leak urine and how much you leak  Make a note of what you were doing when you leaked urine  · Drink liquids as directed  You may need to limit the amount of liquid you drink to help control your urine leakage  Do not drink any liquid right before you go to bed  Limit or do not have drinks that contain caffeine or alcohol  · Prevent constipation  Eat a variety of high-fiber foods  Good examples are high-fiber cereals, beans, vegetables, and whole-grain breads  Walking is the best way to trigger your intestines to have a bowel movement  · Exercise regularly and maintain a healthy weight  Weight loss and exercise will decrease pressure on your bladder and help you control your leakage  · Use a catheter as directed  to help empty your bladder  A catheter is a tiny, plastic tube that is put into your bladder to drain your urine  · Go to behavior therapy as directed  Behavior therapy may be used to help you learn to control your urge to urinate      Weight Management   Why it is important to manage your weight:  Being overweight increases your risk of health conditions such as heart disease, high blood pressure, type 2 diabetes, and certain types of cancer  It can also increase your risk for osteoarthritis, sleep apnea, and other respiratory problems  Aim for a slow, steady weight loss  Even a small amount of weight loss can lower your risk of health problems  How to lose weight safely:  A safe and healthy way to lose weight is to eat fewer calories and get regular exercise  You can lose up about 1 pound a week by decreasing the number of calories you eat by 500 calories each day  Healthy meal plan for weight management:  A healthy meal plan includes a variety of foods, contains fewer calories, and helps you stay healthy  A healthy meal plan includes the following:  · Eat whole-grain foods more often  A healthy meal plan should contain fiber  Fiber is the part of grains, fruits, and vegetables that is not broken down by your body  Whole-grain foods are healthy and provide extra fiber in your diet  Some examples of whole-grain foods are whole-wheat breads and pastas, oatmeal, brown rice, and bulgur  · Eat a variety of vegetables every day  Include dark, leafy greens such as spinach, kale, live greens, and mustard greens  Eat yellow and orange vegetables such as carrots, sweet potatoes, and winter squash  · Eat a variety of fruits every day  Choose fresh or canned fruit (canned in its own juice or light syrup) instead of juice  Fruit juice has very little or no fiber  · Eat low-fat dairy foods  Drink fat-free (skim) milk or 1% milk  Eat fat-free yogurt and low-fat cottage cheese  Try low-fat cheeses such as mozzarella and other reduced-fat cheeses  · Choose meat and other protein foods that are low in fat  Choose beans or other legumes such as split peas or lentils   Choose fish, skinless poultry (chicken or turkey), or lean cuts of red meat (beef or pork)  Before you cook meat or poultry, cut off any visible fat  · Use less fat and oil  Try baking foods instead of frying them  Add less fat, such as margarine, sour cream, regular salad dressing and mayonnaise to foods  Eat fewer high-fat foods  Some examples of high-fat foods include french fries, doughnuts, ice cream, and cakes  · Eat fewer sweets  Limit foods and drinks that are high in sugar  This includes candy, cookies, regular soda, and sweetened drinks  Exercise:  Exercise at least 30 minutes per day on most days of the week  Some examples of exercise include walking, biking, dancing, and swimming  You can also fit in more physical activity by taking the stairs instead of the elevator or parking farther away from stores  Ask your healthcare provider about the best exercise plan for you  © Copyright [employer] 2018 Information is for End User's use only and may not be sold, redistributed or otherwise used for commercial purposes   All illustrations and images included in CareNotes® are the copyrighted property of A D A M [employer]  or [address] [place] [address]

## 2022-09-07 NOTE — ASSESSMENT & PLAN NOTE
Lab Results   Component Value Date    HGBA1C 6 3 09/06/2022       Pts symptoms are stable with current regime  No changes at present

## 2022-09-12 ENCOUNTER — TELEPHONE (OUTPATIENT)
Dept: PAIN MEDICINE | Facility: CLINIC | Age: 67
End: 2022-09-12

## 2022-09-12 DIAGNOSIS — M48.061 SPINAL STENOSIS OF LUMBAR REGION WITHOUT NEUROGENIC CLAUDICATION: ICD-10-CM

## 2022-09-12 DIAGNOSIS — G89.29 CHRONIC BILATERAL LOW BACK PAIN WITHOUT SCIATICA: ICD-10-CM

## 2022-09-12 DIAGNOSIS — G89.4 CHRONIC PAIN SYNDROME: ICD-10-CM

## 2022-09-12 DIAGNOSIS — M51.36 LUMBAR DEGENERATIVE DISC DISEASE: Primary | ICD-10-CM

## 2022-09-12 DIAGNOSIS — M54.50 CHRONIC BILATERAL LOW BACK PAIN WITHOUT SCIATICA: ICD-10-CM

## 2022-09-12 NOTE — TELEPHONE ENCOUNTER
----- Message from Huseyin Chan RN sent at 9/12/2022  8:59 AM EDT -----  Regarding: FW: Therapy  Please advise   ----- Message -----  From: Delfino Davis  Sent: 9/10/2022   1:19 PM EDT  To: [facility]  Subject: Therapy                                          I see that [facility] physical therapy is affiliated with [facility]    I  am noticing an increase symptoms including heaviness left leg since I  have not been in my pool and was wondering if I could get aquatic therapy at this facility

## 2022-09-18 DIAGNOSIS — E11.65 TYPE 2 DIABETES MELLITUS WITH HYPERGLYCEMIA, WITHOUT LONG-TERM CURRENT USE OF INSULIN (HCC): ICD-10-CM

## 2022-09-22 DIAGNOSIS — E11.65 TYPE 2 DIABETES MELLITUS WITH HYPERGLYCEMIA, WITHOUT LONG-TERM CURRENT USE OF INSULIN (HCC): ICD-10-CM

## 2022-09-29 ENCOUNTER — EVALUATION (OUTPATIENT)
Dept: PHYSICAL THERAPY | Age: 67
End: 2022-09-29
Payer: MEDICARE

## 2022-09-29 DIAGNOSIS — M48.061 SPINAL STENOSIS OF LUMBAR REGION WITHOUT NEUROGENIC CLAUDICATION: ICD-10-CM

## 2022-09-29 DIAGNOSIS — M54.50 CHRONIC BILATERAL LOW BACK PAIN WITHOUT SCIATICA: ICD-10-CM

## 2022-09-29 DIAGNOSIS — G89.29 CHRONIC BILATERAL LOW BACK PAIN WITHOUT SCIATICA: ICD-10-CM

## 2022-09-29 DIAGNOSIS — G89.4 CHRONIC PAIN SYNDROME: ICD-10-CM

## 2022-09-29 DIAGNOSIS — M51.36 LUMBAR DEGENERATIVE DISC DISEASE: ICD-10-CM

## 2022-09-29 PROCEDURE — 97113 AQUATIC THERAPY/EXERCISES: CPT | Performed by: PHYSICAL THERAPIST

## 2022-09-29 PROCEDURE — 97162 PT EVAL MOD COMPLEX 30 MIN: CPT | Performed by: PHYSICAL THERAPIST

## 2022-09-29 NOTE — PROGRESS NOTES
PT Evaluation     Today's date: 2022  Patient name: Connie King  : 1955  MRN: [id number]  Referring provider: TAMELA Odonnell  Dx:   Encounter Diagnosis     ICD-10-CM    1  Lumbar degenerative disc disease  M51 36 Ambulatory Referral to Physical Therapy   2  Chronic bilateral low back pain without sciatica  M54 50 Ambulatory Referral to Physical Therapy    G89 29    3  Chronic pain syndrome  G89 4 Ambulatory Referral to Physical Therapy   4  Spinal stenosis of lumbar region without neurogenic claudication  M48 061 Ambulatory Referral to Physical Therapy       Start Time: 1446  Stop Time: 1244  Total time in clinic (min): 58 minutes    Assessment  Assessment details: Connie King is a 74 y o  female who presents with pain, decreased strength and decreased ROM  Due to these impairments, Patient has difficulty performing a/iadls and recreational activities  Patient's clinical presentation is consistent with their referring diagnosis of spinal stenosis, low back pain  Patient seen today for PT evaluation and initiated aquatic session with good tolerance  Patient would benefit from skilled physical therapy to address their aforementioned impairments, improve their level of function and to improve their overall quality of life  Impairments: abnormal or restricted ROM, activity intolerance, impaired physical strength, lacks appropriate home exercise program, pain with function, poor posture  and poor body mechanics    Symptom irritability: moderateUnderstanding of Dx/Px/POC: excellent  Goals  ST-4 WEEKS  1  Decrease LB pain < 4/10 on VAS at its worst   2   Increase ROM by > 5 deg in all deficients planes  3   Increase core strength by 1/2 MMT grade  4  Increase tolerance to activity and pool therapy > 45 min  LT-6 WEEKS  1  Patient to be independent with a/iadls  2  Increase functional activities for leisure and home activities to previous LOF    3  Independent with HEP and/or fitness program   4  Able to stand > 30 min to cook, clean and shop without increased symptoms  Plan  Patient would benefit from: skilled physical therapy  Planned modality interventions: cryotherapy, thermotherapy: hydrocollator packs and unattended electrical stimulation  Planned therapy interventions: activity modification, behavior modification, body mechanics training, aquatic therapy, flexibility, functional ROM exercises, home exercise program, IADL retraining, joint mobilization, manual therapy, neuromuscular re-education, patient education, postural training, strengthening, stretching, therapeutic activities, therapeutic exercise and abdominal trunk stabilization  Frequency: 2-3x week  Duration in weeks: 12  Plan of Care beginning date: 2022  Plan of Care expiration date: 2022  Treatment plan discussed with: patient        Subjective Evaluation    History of Present Illness  Mechanism of injury: Patient reports to PT due to spinal stenosis in L/s  She had xrays and then MRI showing deteriation  She has home pool and noticed how good she feels  She had one injection and only c/o right LBP with prolonged standing > 5 min  Patient referred for aquatic PT             Recurrent probem    Pain  Current pain ratin  At worst pain ratin  Location: RLB  Relieving factors: rest and change in position  Aggravating factors: standing, walking and stair climbing  Progression: no change    Social Support  Steps to enter house: yes  Stairs in house: yes   Lives in: multiple-level home  Lives with: spouse    Employment status: not working    Diagnostic Tests  X-ray: abnormal  MRI studies: abnormal  Treatments  Previous treatment: injection treatment  Patient Goals  Patient goals for therapy: decreased pain, increased motion and increased strength  Patient goal: stand > 30 min to cook, carissa, walk better        Objective     Concurrent Complaints  Negative for night pain, disturbed sleep, bladder dysfunction, bowel dysfunction, saddle (S4) numbness, cardiac problem, kidney problem, gallbladder problem, stomach problem, ulcer, appendix problem, spleen problem, pancreas problem, history of cancer, history of trauma and infection    Static Posture     Lumbar Spine   Increased lordosis  Postural Observations  Seated posture: fair  Standing posture: fair        Palpation   Left   Hypertonic in the erector spinae  Right   Hypertonic in the erector spinae  Tenderness     Right Hip   Tenderness in the PSIS  Neurological Testing     Sensation     Lumbar   Left   Intact: light touch    Right   Intact: light touch    Reflexes   Left   Patellar (L4): normal (2+)    Right   Patellar (L4): trace (1+)    Active Range of Motion     Lumbar   Flexion: Active lumbar flexion: tips to ankles  Extension:  WFL  Left lateral flexion:  WFL  Right lateral flexion:  WFL and with pain    Additional Active Range of Motion Details  Hamstring flex: 80° bilateral    Strength/Myotome Testing     Additional Strength Details  Core/abdominals:4-4+/5  BLE's WFL    Tests     Lumbar     Left   Negative passive SLR  Right   Negative passive SLR  Left Hip   Negative GERARDO and FADIR  Right Hip   Negative GERARDO and FADIR       Ambulation   Weight-Bearing Status   Assistive device used: none    Ambulation: Level Surfaces   Ambulation without assistive device: independent    Ambulation: Stairs   Pattern: non-reciprocal  Railings: one rail  Pattern: non-reciprocal  Railings: one rail    General Comments:    Lower quarter screen   Hips: unremarkable  Knees: unremarkable  Foot/ankle: unremarkable    Knee Comments  Patient c/o DJD both knees but WFL for ambulation and ADL's      Flowsheet Rows    Flowsheet Row Most Recent Value   PT/OT G-Codes    Current Score 55   Projected Score 66               Precautions: thyroid disease, R wrist sx, HTN, B knee DJD, DM,  Daily Treatment Diary     Date 9/29          Water Walk (FW, BW, side) x10  10'          LE work at wall               Hip FF/ext swing              Toe/heel              Hip ABD/ADD              March             Knee flexion & extension             Squats           UE noodle x3             Push/pull              Rotate              Row forward & back              OH side bend            UE/Core (AROM, paddles, MB)              ABD/ADD              Horizontal Pec Fly              Alt  arm swing (shld flex/ext)              Push pull              Single paddle rotation           SLS (EO, EC, ball toss)            Aqua Step (FW, lat, eccentric)            Core work:              MF press (red, blue, blk)             Kickboard press (blue, red)              Row/ext w/ tubing (red, blue, blk)           Seated on bench             ankle DF/PF              March              ABD/ADD              Knee flexion/extension            DW TX - hang in the deep water  10'            DW ABD/ADD              DW Bicycle              DW Scissor hip flexion/extension            Stretches              HS/calf              Wall stretches             Other:            Hot Tub - 10 minutes only  10'

## 2022-10-03 ENCOUNTER — OFFICE VISIT (OUTPATIENT)
Dept: PHYSICAL THERAPY | Age: 67
End: 2022-10-03
Payer: MEDICARE

## 2022-10-03 DIAGNOSIS — M54.50 CHRONIC BILATERAL LOW BACK PAIN WITHOUT SCIATICA: ICD-10-CM

## 2022-10-03 DIAGNOSIS — M51.36 LUMBAR DEGENERATIVE DISC DISEASE: Primary | ICD-10-CM

## 2022-10-03 DIAGNOSIS — G89.29 CHRONIC BILATERAL LOW BACK PAIN WITHOUT SCIATICA: ICD-10-CM

## 2022-10-03 DIAGNOSIS — M48.061 SPINAL STENOSIS OF LUMBAR REGION WITHOUT NEUROGENIC CLAUDICATION: ICD-10-CM

## 2022-10-03 DIAGNOSIS — G89.4 CHRONIC PAIN SYNDROME: ICD-10-CM

## 2022-10-03 PROCEDURE — 97113 AQUATIC THERAPY/EXERCISES: CPT

## 2022-10-03 NOTE — PROGRESS NOTES
Daily Note     Today's date: 10/3/2022  Patient name: Jose A De Paz  : 1955  MRN: [id number]  Referring provider: TAMELA Gonzalez  Dx:   Encounter Diagnosis     ICD-10-CM    1  Lumbar degenerative disc disease  M51 36    2  Chronic bilateral low back pain without sciatica  M54 50     G89 29    3  Chronic pain syndrome  G89 4    4  Spinal stenosis of lumbar region without neurogenic claudication  M48 061        Start Time: 1400  Stop Time: 1500  Total time in clinic (min): 60 minutes    Subjective: pt notes she did have some increased LBP today from the weather  Objective: See treatment diary below      Assessment: Tolerated treatment well  Today we added standing Le ex's w/ a focus on posture and core activation  Also added core press downs and resistive bands for posture and core  Pt has good technique w/ ex's  cueing for core activation and breathing  Will progress through the aquatic ex's as pt tolerates  She does have limited ROM to R wrist and will modify if needed  Patient would benefit from continued PT      Plan: Continue per plan of care          Precautions: thyroid disease, R wrist sx, HTN, B knee DJD, DM,  Daily Treatment Diary     Date 9/29 10/3         Water Walk (FW, BW, side) x10  10' 10         LE work at wall               Hip FF/ext swing   2           Toe/heel   1           Hip ABD/ADD   2                      Knee flexion & extension  1           Squats  1         UE noodle x3             Push/pull   1           Rotate   1           Row forward & back              OH side bend            UE/Core (AROM, paddles, MB)              ABD/ADD              Horizontal Pec Fly              Alt  arm swing (shld flex/ext)              Push pull              Single paddle rotation           SLS (EO, EC, ball toss)            Aqua Step (FW, lat, eccentric)            Core work:              MF press (red, blue, blk)             Kickboard press ( red)   10''x5           Row/ext w/ tubing ( blk)  2'         Seated on bench             ankle DF/PF              March              ABD/ADD              Knee flexion/extension            DW TX - hang in the deep water  10' 10           DW ABD/ADD              DW Bicycle   5           DW Scissor hip flexion/extension            Stretches              HS/calf   2'           Wall stretches             Other:            Hot Tub - 10 minutes only  10' 10

## 2022-10-06 ENCOUNTER — OFFICE VISIT (OUTPATIENT)
Dept: PHYSICAL THERAPY | Age: 67
End: 2022-10-06
Payer: MEDICARE

## 2022-10-06 DIAGNOSIS — M54.50 CHRONIC BILATERAL LOW BACK PAIN WITHOUT SCIATICA: ICD-10-CM

## 2022-10-06 DIAGNOSIS — M48.061 SPINAL STENOSIS OF LUMBAR REGION WITHOUT NEUROGENIC CLAUDICATION: ICD-10-CM

## 2022-10-06 DIAGNOSIS — G89.29 CHRONIC BILATERAL LOW BACK PAIN WITHOUT SCIATICA: ICD-10-CM

## 2022-10-06 DIAGNOSIS — G89.4 CHRONIC PAIN SYNDROME: ICD-10-CM

## 2022-10-06 DIAGNOSIS — M51.36 LUMBAR DEGENERATIVE DISC DISEASE: Primary | ICD-10-CM

## 2022-10-06 PROCEDURE — 97113 AQUATIC THERAPY/EXERCISES: CPT

## 2022-10-06 NOTE — PROGRESS NOTES
Daily Note     Today's date: 10/6/2022  Patient name: Abel Guerra  : 1955  MRN: [id number]  Referring provider: TAMELA Rudd  Dx:   Encounter Diagnosis     ICD-10-CM    1  Lumbar degenerative disc disease  M51 36    2  Chronic bilateral low back pain without sciatica  M54 50     G89 29    3  Chronic pain syndrome  G89 4    4  Spinal stenosis of lumbar region without neurogenic claudication  M48 061                   Subjective: ***      Objective: See treatment diary below      Assessment: Tolerated treatment {Tolerated treatment :9082438548}   Patient {assessment:[id number]}      Plan: {PLAN:3163695507}       Precautions: thyroid disease, R wrist sx, HTN, B knee DJD, DM,  Daily Treatment Diary     Date 9/29 10/3         Water Walk (FW, BW, side) x10  10' 10         LE work at wall               Hip FF/ext swing   2           Toe/heel   1           Hip ABD/ADD   2                      Knee flexion & extension  1           Squats  1         UE noodle x3             Push/pull   1           Rotate   1           Row forward & back              OH side bend            UE/Core (AROM, paddles, MB)              ABD/ADD              Horizontal Pec Fly              Alt  arm swing (shld flex/ext)              Push pull              Single paddle rotation           SLS (EO, EC, ball toss)            Aqua Step (FW, lat, eccentric)            Core work:              MF press (red, blue, blk)             Kickboard press ( red)   10''x5           Row/ext w/ tubing ( blk)  2'         Seated on bench             ankle DF/PF              March              ABD/ADD              Knee flexion/extension            DW TX - hang in the deep water  10' 10           DW ABD/ADD              DW Bicycle   5           DW Scissor hip flexion/extension            Stretches              HS/calf   2'           Wall stretches             Other:            Hot Tub - 10 minutes only  10' 10

## 2022-10-06 NOTE — PROGRESS NOTES
Daily Note     Today's date: 10/6/2022  Patient name: Zeina Rocha  : 1955  MRN: [id number]  Referring provider: TAMELA Bowden  Dx:   Encounter Diagnosis     ICD-10-CM    1  Lumbar degenerative disc disease  M51 36    2  Chronic bilateral low back pain without sciatica  M54 50     G89 29    3  Chronic pain syndrome  G89 4    4  Spinal stenosis of lumbar region without neurogenic claudication  M48 061        Start Time: 1300  Stop Time: 1400  Total time in clinic (min): 60 minutes    Subjective: Reports 0-1/10 pain after sitting in a chair  States her pain is generally with prolonged standing  Objective: See treatment diary below      Assessment: Tolerated treatment well  Cues for carryover of program and monitoring of time to ensure proper dosage of prescribed exercises are performed  Slight discomfort noted post MF press with blue tband, may need to reduce resistance by stepping closer to anchor next session or try the red tubing  Pain did subside post activity  No c/o pain post session, relief felt with deep water hanging and WP treatment  Patient would benefit from continued PT  Plan: Continue per plan of care          Precautions: thyroid disease, R wrist sx, HTN, B knee DJD, DM,  Daily Treatment Diary     Date 9/29 10/3 106        Water Walk (FW, BW, side) x10  10' 10 10'        LE work at wall               Hip FF/ext swing   2 2'          Toe/heel   1 1'          Hip ABD/ADD   2 2'          March  1 1'          Knee flexion & extension  1 1'          Squats  1 1'        UE noodle x3             Push/pull   1 1'          Rotate   1 1'          Row forward & back              OH side bend            UE/Core (AROM, paddles, MB)              ABD/ADD              Horizontal Pec Fly              Alt  arm swing (shld flex/ext)              Push pull              Single paddle rotation           SLS (EO, EC, ball toss)            Aqua Step (FW, lat, eccentric)            Core work: MF press (blue)   5''x10 ea Red?           Kickboard press ( red)   10''x5 10''x5           Row/ext w/ tubing ( blue)  2' 2'         Seated on bench             ankle DF/PF              March              ABD/ADD              Knee flexion/extension            DW TX - hang in the deep water  10' 10 10'          DW ABD/ADD              DW Bicycle   5 5'          DW Scissor hip flexion/extension            Stretches              HS/calf   2' 4'          Wall stretches             Other:            Hot Tub - 10 minutes only  10' 10 10'

## 2022-10-10 ENCOUNTER — OFFICE VISIT (OUTPATIENT)
Dept: PHYSICAL THERAPY | Age: 67
End: 2022-10-10
Payer: MEDICARE

## 2022-10-10 DIAGNOSIS — M54.50 CHRONIC BILATERAL LOW BACK PAIN WITHOUT SCIATICA: ICD-10-CM

## 2022-10-10 DIAGNOSIS — M51.36 LUMBAR DEGENERATIVE DISC DISEASE: Primary | ICD-10-CM

## 2022-10-10 DIAGNOSIS — M48.061 SPINAL STENOSIS OF LUMBAR REGION WITHOUT NEUROGENIC CLAUDICATION: ICD-10-CM

## 2022-10-10 DIAGNOSIS — G89.29 CHRONIC BILATERAL LOW BACK PAIN WITHOUT SCIATICA: ICD-10-CM

## 2022-10-10 DIAGNOSIS — G89.4 CHRONIC PAIN SYNDROME: ICD-10-CM

## 2022-10-10 PROCEDURE — 97113 AQUATIC THERAPY/EXERCISES: CPT

## 2022-10-10 NOTE — PROGRESS NOTES
Daily Note     Today's date: 10/10/2022  Patient name: Theo Nam  : 1955  MRN: [id number]  Referring provider: TAMELA Marvin  Dx:   Encounter Diagnosis     ICD-10-CM    1  Lumbar degenerative disc disease  M51 36    2  Chronic bilateral low back pain without sciatica  M54 50     G89 29    3  Chronic pain syndrome  G89 4    4  Spinal stenosis of lumbar region without neurogenic claudication  M48 061        Start Time: 1500  Stop Time: 1600  Total time in clinic (min): 60 minutes    Subjective: patient reports she is hurting a little today, 2-3/10 LBP  Objective: See treatment diary below      Assessment: Tolerated treatment fairly well, some discomfort in her R wrist with program today, modified as best as we could, she did MF press with holding onto theraband with one hand  No c/o pain in her LB with program  Patient required verbal and tactile cueing throughout prescribed exercises for proper execution and dosage  Patient required verbal cueing throughout prescribed exercises for proper execution and dosage  Patient exhibited good technique with therapeutic exercises and would benefit from continued PT, relief noted end of session  Plan: Continue per plan of care  Precautions: thyroid disease, R wrist sx, HTN, B knee DJD, DM,  Daily Treatment Diary     Date 9/29 10/3 10/6 10/10       Water Walk (FW, BW, side) x10’  10' 10 10' 10'       LE work at wall               Hip FF/ext swing   2 2' 2         Toe/heel   1 1' 1         Hip ABD/ADD   2 2' 2         March  1 1' 1         Knee flexion & extension  1 1' 1         Squats  1 1' 1       UE noodle x3             Push/pull   1 1' 1         Rotate   1 1' 1         Row forward & back              OH side bend            UE/Core (AROM, paddles, MB)     OB         ABD/ADD     1         Horizontal Pec Fly     1         Alt   arm swing (shld flex/ext)     1         Push pull     1         Single paddle rotation           SLS (EO, EC, ball toss)            Aqua Step (FW, lat, eccentric)            Core work:              MF press (blue)   5''x10 ea Red?           Kickboard press ( red)   10''x5 10''x5           Row/ext w/ tubing ( blue)  2' 2'         Seated on bench             ankle DF/PF              March              ABD/ADD              Knee flexion/extension            DW TX - hang in the deep water  10' 10 10'          DW ABD/ADD              DW Bicycle   5 5'          DW Scissor hip flexion/extension            Stretches              HS/calf   2' 4'          Wall stretches             Other:            Hot Tub - 10 minutes only  10' 10 10'

## 2022-10-13 ENCOUNTER — OFFICE VISIT (OUTPATIENT)
Dept: PHYSICAL THERAPY | Age: 67
End: 2022-10-13
Payer: MEDICARE

## 2022-10-13 DIAGNOSIS — M51.36 LUMBAR DEGENERATIVE DISC DISEASE: Primary | ICD-10-CM

## 2022-10-13 DIAGNOSIS — G89.29 CHRONIC BILATERAL LOW BACK PAIN WITHOUT SCIATICA: ICD-10-CM

## 2022-10-13 DIAGNOSIS — M54.50 CHRONIC BILATERAL LOW BACK PAIN WITHOUT SCIATICA: ICD-10-CM

## 2022-10-13 DIAGNOSIS — G89.4 CHRONIC PAIN SYNDROME: ICD-10-CM

## 2022-10-13 DIAGNOSIS — M48.061 SPINAL STENOSIS OF LUMBAR REGION WITHOUT NEUROGENIC CLAUDICATION: ICD-10-CM

## 2022-10-13 PROCEDURE — 97113 AQUATIC THERAPY/EXERCISES: CPT

## 2022-10-13 NOTE — PROGRESS NOTES
Daily Note     Today's date: 10/13/2022  Patient name: Phan Fletcher  : 1955  MRN: [id number]  Referring provider: TAMELA Wallace  Dx:   Encounter Diagnosis     ICD-10-CM    1  Lumbar degenerative disc disease  M51 36    2  Chronic bilateral low back pain without sciatica  M54 50     G89 29    3  Chronic pain syndrome  G89 4    4  Spinal stenosis of lumbar region without neurogenic claudication  M48 061        Start Time: 1100  Stop Time: 1200  Total time in clinic (min): 60 minutes    Subjective: Patient reports still 2-3/10 LBP      Objective: See treatment diary below      Assessment: Tolerated treatment well, Cues for carryover of program and monitoring of time to ensure proper dosage of prescribed exercises are performed  Slight discomfort in groin today patient noted with SL flex/ext  Patient exhibited good technique with therapeutic exercises and would benefit from continued PT      Plan: Continue per plan of care  Precautions: thyroid disease, R wrist sx, HTN, B knee DJD, DM,  Daily Treatment Diary     Date 9/29 10/3 10/6 10/10 10/13      Water Walk (FW, BW, side) x10’  10' 10 10' 10' 10      LE work at wall               Hip FF/ext swing   2 2' 2 2        Toe/heel   1 ' 1 1        Hip ABD/ADD   2 2' 2 2        March  1 ' 1 1        Knee flexion & extension  1 1' 1 1        Squats  1 1' 1 1      UE noodle x3             Push/pull   1 1' 1 1        Rotate   1 1' 1 1        Row forward & back              OH side bend            UE/Core (AROM, paddles, MB)     OB         ABD/ADD     1 1        Horizontal Pec Fly     1 1        Alt   arm swing (shld flex/ext)     1 1        Push pull     1 1        Single paddle rotation           SLS (EO, EC, ball toss)            Aqua Step (FW, lat, eccentric)            Core work:              MF press (blue)   5''x10 ea Red?  blue5"x10        Kickboard press ( red)   10''x5 10''x5   5"x10        Row/ext w/ tubing ( blue)  2' 2'  2 2      Seated on bench             ankle DF/PF              March              ABD/ADD              Knee flexion/extension            DW TX - hang in the deep water  10' 10 10'  10        DW ABD/ADD              DW Bicycle   5 5'  5        DW Scissor hip flexion/extension            Stretches              HS/calf   2' 4'  2        Wall stretches     2        Other:            Hot Tub - 10 minutes only  10' 10 10'  10

## 2022-10-17 ENCOUNTER — OFFICE VISIT (OUTPATIENT)
Dept: PHYSICAL THERAPY | Age: 67
End: 2022-10-17
Payer: MEDICARE

## 2022-10-17 DIAGNOSIS — M54.50 CHRONIC BILATERAL LOW BACK PAIN WITHOUT SCIATICA: ICD-10-CM

## 2022-10-17 DIAGNOSIS — M51.36 LUMBAR DEGENERATIVE DISC DISEASE: Primary | ICD-10-CM

## 2022-10-17 DIAGNOSIS — G89.4 CHRONIC PAIN SYNDROME: ICD-10-CM

## 2022-10-17 DIAGNOSIS — G89.29 CHRONIC BILATERAL LOW BACK PAIN WITHOUT SCIATICA: ICD-10-CM

## 2022-10-17 DIAGNOSIS — M48.061 SPINAL STENOSIS OF LUMBAR REGION WITHOUT NEUROGENIC CLAUDICATION: ICD-10-CM

## 2022-10-17 PROCEDURE — 97113 AQUATIC THERAPY/EXERCISES: CPT

## 2022-10-17 NOTE — PROGRESS NOTES
Daily Note     Today's date: 10/17/2022  Patient name: Connie King  : 1955  MRN: [id number]  Referring provider: TAMELA Odonnell  Dx:   Encounter Diagnosis     ICD-10-CM    1  Lumbar degenerative disc disease  M51 36    2  Chronic bilateral low back pain without sciatica  M54 50     G89 29    3  Chronic pain syndrome  G89 4    4  Spinal stenosis of lumbar region without neurogenic claudication  M48 061        Start Time: 1300  Stop Time: 1400  Total time in clinic (min): 60 minutes    Subjective: pt notes " I feel the same since starting PT "      Objective: See treatment diary below      Assessment: Tolerated treatment well  Working on core activation w/ all ex's w/ a focus on breathing  Increased resistive bands today for more of a challenge for posture rows and core sh ext  And MF press  Pt did well w/ new bands  Pt has a good understanding of her ex's  Patient would benefit from continued PT      Plan: Continue per plan of care  Precautions: thyroid disease, R wrist sx, HTN, B knee DJD, DM,  Daily Treatment Diary     Date 9/29 10/3 10/6 10/10 10/13 10/17     Water Walk (FW, BW, side) x10’  10' 10 10' 10' 10 10     LE work at wall               Hip FF/ext swing   2 2' 2 2 2       Toe/heel   1 1' 1 1 1       Hip ABD/ADD   2 2' 2 2 2       March  1 1' 1 1 1       Knee flexion & extension  1 1' 1 1 1       Squats  1 1' 1 1 1     UE noodle x3             Push/pull   1 1' 1 1 1       Rotate   1 1' 1 1 1       Row forward & back              OH side bend            UE/Core (AROM, paddles, MB)     OB         ABD/ADD     1 1 1       Horizontal Pec Fly     1 1 1       Alt   arm swing (shld flex/ext)     1 1 1       Push pull     1 1 1       Single paddle rotation           SLS (EO, EC, ball toss)            Aqua Step (FW, lat, eccentric)            Core work:              MF press (blue)   5''x10 ea Red?  blue5"x10 bk 5''x5       Kickboard press ( red)   10''x5 10''x5   5"x10 10''x10 Row/ext w/ tubing ( blue)  2' 2'  2 2 2' bk     Seated on bench             ankle DF/PF              March              ABD/ADD              Knee flexion/extension            DW TX - hang in the deep water  10' 10 10'  10 10       DW ABD/ADD              DW Bicycle   5 5'  5 5       DW Scissor hip flexion/extension            Stretches              HS/calf   2' 4'  2 2       Wall stretches     2 2       Other:            Hot Tub - 10 minutes only  10' 10 10'  10 10

## 2022-10-20 ENCOUNTER — OFFICE VISIT (OUTPATIENT)
Dept: PHYSICAL THERAPY | Age: 67
End: 2022-10-20
Payer: MEDICARE

## 2022-10-20 DIAGNOSIS — M54.50 CHRONIC BILATERAL LOW BACK PAIN WITHOUT SCIATICA: ICD-10-CM

## 2022-10-20 DIAGNOSIS — G89.4 CHRONIC PAIN SYNDROME: ICD-10-CM

## 2022-10-20 DIAGNOSIS — M51.36 LUMBAR DEGENERATIVE DISC DISEASE: Primary | ICD-10-CM

## 2022-10-20 DIAGNOSIS — G89.29 CHRONIC BILATERAL LOW BACK PAIN WITHOUT SCIATICA: ICD-10-CM

## 2022-10-20 DIAGNOSIS — M48.061 SPINAL STENOSIS OF LUMBAR REGION WITHOUT NEUROGENIC CLAUDICATION: ICD-10-CM

## 2022-10-20 PROCEDURE — 97150 GROUP THERAPEUTIC PROCEDURES: CPT

## 2022-10-20 PROCEDURE — 97113 AQUATIC THERAPY/EXERCISES: CPT

## 2022-10-20 NOTE — PROGRESS NOTES
Daily Note     Today's date: 10/20/2022  Patient name: Zeina Rocha  : 1955  MRN: [id number]  Referring provider: TAMELA Bowden  Dx:   Encounter Diagnosis     ICD-10-CM    1  Lumbar degenerative disc disease  M51 36    2  Chronic bilateral low back pain without sciatica  M54 50     G89 29    3  Chronic pain syndrome  G89 4    4  Spinal stenosis of lumbar region without neurogenic claudication  M48 061        Start Time: 1100  Stop Time: 1200  Total time in clinic (min): 60 minutes    Subjective: pt notes it's a good day because she didn't do a lot at home, no c/o back pain upon arrival to PT  " I only did alittle light sweeping so far this morning "      Objective: See treatment diary below  Pt seen 1:1 for 30 minutes and group therapy for remainder    Assessment: Tolerated treatment well  Pt continues w/ ex's as per flowsheet  my black bands were broke so pt used the blue ones today  Added DKTC in deep water today  Patient would benefit from continued PT      Plan: Continue per plan of care  Precautions: thyroid disease, R wrist sx, HTN, B knee DJD, DM,  Daily Treatment Diary     Date 9/29 10/3 10/6 10/10 10/13 10/17 10/20    Water Walk (FW, BW, side) x10’  10' 10 10' 10' 10 10 10    LE work at wall               Hip FF/ext swing   2 2' 2 2 2 2      Toe/heel   1 1' 1 1 1 1      Hip ABD/ADD   2 2' 2 2 2 2       1' 1 1 1 1      Knee flexion & extension  1 1' 1 1 1 1      Squats  1 1' 1 1 1 1    UE noodle x3             Push/pull   1 1' 1 1 1       Rotate   1 1' 1 1 1 1      Row forward & back              OH side bend            UE/Core (AROM, paddles, MB)     OB   CB      ABD/ADD     1 1 1 1      Horizontal Pec Fly     1 1 1 1      Alt   arm swing (shld flex/ext)     1 1 1 1      Push pull     1 1 1 1      Single paddle rotation           SLS (EO, EC, ball toss)            Aqua Step (FW, lat, eccentric)            Core work:              MF press (blue)   5''x10 ea Red?  blue5"x10 bk 5''x5 bl5''x10 ea      Kickboard press ( red)   10''x5 10''x5   5"x10 10''x10       Row/ext w/ tubing ( blue)  2' 2'  2 2 2' bk 2 clarisse    Seated on bench             ankle DF/PF              March              ABD/ADD              Knee flexion/extension            DW TX - hang in the deep water  10' 10 10' 10 10 10 10      DW ABD/ADD              DW Bicycle   5 5' 6 5 5 6      DW Scissor hip flexion/extension            Stretches              HS/calf   2' 4' 4 2 2 2      Wall stretches     2 2 2      Other:            Hot Tub - 10 minutes only  10' 10 10' 10 10 10 10

## 2022-10-24 DIAGNOSIS — E03.9 ACQUIRED HYPOTHYROIDISM: ICD-10-CM

## 2022-10-24 DIAGNOSIS — E78.5 HYPERLIPIDEMIA, UNSPECIFIED HYPERLIPIDEMIA TYPE: ICD-10-CM

## 2022-10-24 RX ORDER — LEVOTHYROXINE SODIUM 88 UG/1
88 TABLET ORAL DAILY
Qty: 90 TABLET | Refills: 1 | Status: SHIPPED | OUTPATIENT
Start: 2022-10-24

## 2022-10-24 RX ORDER — PRAVASTATIN SODIUM 20 MG
20 TABLET ORAL DAILY
Qty: 90 TABLET | Refills: 1 | Status: SHIPPED | OUTPATIENT
Start: 2022-10-24

## 2022-10-25 ENCOUNTER — OFFICE VISIT (OUTPATIENT)
Dept: PHYSICAL THERAPY | Age: 67
End: 2022-10-25
Payer: MEDICARE

## 2022-10-25 DIAGNOSIS — M54.50 CHRONIC BILATERAL LOW BACK PAIN WITHOUT SCIATICA: ICD-10-CM

## 2022-10-25 DIAGNOSIS — G89.4 CHRONIC PAIN SYNDROME: ICD-10-CM

## 2022-10-25 DIAGNOSIS — M51.36 LUMBAR DEGENERATIVE DISC DISEASE: Primary | ICD-10-CM

## 2022-10-25 DIAGNOSIS — G89.29 CHRONIC BILATERAL LOW BACK PAIN WITHOUT SCIATICA: ICD-10-CM

## 2022-10-25 DIAGNOSIS — M48.061 SPINAL STENOSIS OF LUMBAR REGION WITHOUT NEUROGENIC CLAUDICATION: ICD-10-CM

## 2022-10-25 PROCEDURE — 97113 AQUATIC THERAPY/EXERCISES: CPT

## 2022-10-25 PROCEDURE — 97150 GROUP THERAPEUTIC PROCEDURES: CPT

## 2022-10-25 NOTE — PROGRESS NOTES
Daily Note     Today's date: 10/25/2022  Patient name: Jose Angel Mendoza  : 1955  MRN: [id number]  Referring provider: TAMELA Baxter  Dx:   Encounter Diagnosis     ICD-10-CM    1  Lumbar degenerative disc disease  M51 36    2  Chronic bilateral low back pain without sciatica  M54 50     G89 29    3  Chronic pain syndrome  G89 4    4  Spinal stenosis of lumbar region without neurogenic claudication  M48 061        Start Time: 1000  Stop Time: 1100  Total time in clinic (min): 60 minutes    Subjective: pt offered no new complaints  Objective: See treatment diary below  Pt seen 1:1 for 30 min and group therapy for remainder    Assessment: Tolerated treatment well  Patient would benefit from continued PT      Plan: Continue per plan of care  Precautions: thyroid disease, R wrist sx, HTN, B knee DJD, DM,  Daily Treatment Diary     Date 9/29 10/3 10/6 10/10 10/13 10/17 10/20 10/25   Water Walk (FW, BW, side) x10’  10' 10 10' 10' 10 10 10 10   LE work at wall               Hip FF/ext swing   2 2' 2 2 2 2 2     Toe/heel   1 1' 1 1 1 1 1     Hip ABD/ADD   2 2' 2 2 2 2 2      1' 1 1 1 1 1     Knee flexion & extension  1 1' 1 1 1 1 1     Squats  1 1' 1 1 1 1 1   UE noodle x3             Push/pull   1 1' 1 1 1       Rotate   1 1' 1 1 1 1 1     Row forward & back              OH side bend            UE/Core (AROM, paddles, MB)     OB   CB      ABD/ADD     1 1 1 1 1     Horizontal Pec Fly     1 1 1 1 1     Alt   arm swing (shld flex/ext)     1 1 1 1 1     Push pull     1 1 1 1 1     Single paddle rotation           SLS (EO, EC, ball toss)            Aqua Step (FW, lat, eccentric)            Core work:              MF press (blue)   5''x10 ea Red?  blue5"x10 bk 5''x5 bl5''x10 ea x10 ea     Kickboard press ( red)   10''x5 10''x5   5"x10 10''x10       Row/ext w/ tubing ( blue)  2' 2'  2 2 2' bk 2 clarisse 2   Seated on bench             ankle DF/PF              March              ABD/ADD Knee flexion/extension            DW TX - hang in the deep water  10' 10 10' 10 10 10 10 10     DW ABD/ADD              DW Bicycle   5 5' 6 5 5 6 6     DW Scissor hip flexion/extension            Stretches              HS/calf   2' 4' 4 2 2 2 2     Wall stretches     2 2 2 2     Other:            Hot Tub - 10 minutes only  10' 10 10' 10 10 10 10 10

## 2022-10-28 ENCOUNTER — EVALUATION (OUTPATIENT)
Dept: PHYSICAL THERAPY | Age: 67
End: 2022-10-28
Payer: MEDICARE

## 2022-10-28 DIAGNOSIS — G89.4 CHRONIC PAIN SYNDROME: ICD-10-CM

## 2022-10-28 DIAGNOSIS — M51.36 LUMBAR DEGENERATIVE DISC DISEASE: Primary | ICD-10-CM

## 2022-10-28 DIAGNOSIS — M54.50 CHRONIC BILATERAL LOW BACK PAIN WITHOUT SCIATICA: ICD-10-CM

## 2022-10-28 DIAGNOSIS — M48.061 SPINAL STENOSIS OF LUMBAR REGION WITHOUT NEUROGENIC CLAUDICATION: ICD-10-CM

## 2022-10-28 DIAGNOSIS — G89.29 CHRONIC BILATERAL LOW BACK PAIN WITHOUT SCIATICA: ICD-10-CM

## 2022-10-28 PROCEDURE — 97113 AQUATIC THERAPY/EXERCISES: CPT | Performed by: PHYSICAL THERAPIST

## 2022-10-28 NOTE — PROGRESS NOTES
PT Re-Evaluation     Today's date: 10/28/2022  Patient name: Sulema Lee  : 1955  MRN: [id number]  Referring provider: TAMELA Pringle  Dx:   Encounter Diagnosis     ICD-10-CM    1  Lumbar degenerative disc disease  M51 36    2  Chronic bilateral low back pain without sciatica  M54 50     G89 29    3  Chronic pain syndrome  G89 4    4  Spinal stenosis of lumbar region without neurogenic claudication  M48 061        Start Time: 1100  Stop Time: 1202  Total time in clinic (min): 62 minutes    Assessment  Assessment details: Sulema Lee is a 74 y o  female who presents with pain, decreased strength and decreased ROM  Due to these impairments, Patient has difficulty performing a/iadls and recreational activities  Patient's clinical presentation is consistent with their referring diagnosis of spinal stenosis, low back pain  Patient has been having aquatic PT and doing well  Progress to discharge in 2 weeks  Patient is knowledgeable with her program  Agreeable to indep fitness program when d/c'd  Patient would benefit from skilled physical therapy to address their aforementioned impairments, improve their level of function and to improve their overall quality of life  Impairments: abnormal or restricted ROM, activity intolerance, impaired physical strength, lacks appropriate home exercise program, pain with function, poor posture  and poor body mechanics    Symptom irritability: lowUnderstanding of Dx/Px/POC: good   Prognosis: good    Goals  ST-4 WEEKS  1  Decrease LB pain < 4/10 on VAS at its worst  Progressing towards  Ongoing goal    2   Increase ROM by > 5 deg in all deficients planes  Progressing towards  Ongoing goal    3   Increase core strength by 1/2 MMT grade  Progressing towards  Ongoing goal    4  Increase tolerance to activity and pool therapy > 45 min  Goal met  LT-6 WEEKS  1  Patient to be independent with a/iadls   Ongoing goal    2  Increase functional activities for leisure and home activities to previous LOF  Ongoing goal    3  Independent with HEP and/or fitness program  Ongoing goal    4  Able to stand > 30 min to cook, clean and shop without increased symptoms  Able to stand 60 min  Ongoing goal to max potential with standing time  Plan  Patient would benefit from: skilled physical therapy  Planned modality interventions: cryotherapy, thermotherapy: hydrocollator packs and unattended electrical stimulation  Planned therapy interventions: activity modification, behavior modification, body mechanics training, aquatic therapy, flexibility, functional ROM exercises, home exercise program, IADL retraining, joint mobilization, manual therapy, neuromuscular re-education, patient education, postural training, strengthening, stretching, therapeutic activities, therapeutic exercise and abdominal trunk stabilization  Frequency: 2-3x week  Duration in weeks: 8  Plan of Care beginning date: 2022  Plan of Care expiration date: 2022  Treatment plan discussed with: patient        Subjective Evaluation    History of Present Illness  Mechanism of injury: Patient reports to PT due to spinal stenosis in L/s  She had xrays and then MRI showing deteriation  She has home pool and noticed how good she feels  She had one injection and only c/o right LBP with prolonged standing > 5 min  Patient referred for aquatic PT    10/28/22: Patient reports she is feeling much better  No back pain and pool is helping  She is able to stand 1 hour before she needs a break  Going to [place] this weekend and will test her back             Recurrent probem    Pain  Current pain ratin  At worst pain ratin  Location: RLB  Relieving factors: rest and change in position  Aggravating factors: standing, walking and stair climbing  Progression: no change    Social Support  Steps to enter house: yes  Stairs in house: yes   Lives in: multiple-level home  Lives with: spouse    Employment status: not working    Diagnostic Tests  X-ray: abnormal  MRI studies: abnormal  Treatments  Previous treatment: injection treatment  Patient Goals  Patient goals for therapy: decreased pain, increased motion and increased strength  Patient goal: stand > 60 min to cook, carissa, walk better        Objective     Concurrent Complaints  Negative for night pain, disturbed sleep, bladder dysfunction, bowel dysfunction, saddle (S4) numbness, cardiac problem, kidney problem, gallbladder problem, stomach problem, ulcer, appendix problem, spleen problem, pancreas problem, history of cancer, history of trauma and infection    Static Posture     Lumbar Spine   Increased lordosis  Postural Observations  Seated posture: fair  Standing posture: fair        Palpation   Left   Hypertonic in the erector spinae  Right   Hypertonic in the erector spinae  Neurological Testing     Sensation     Lumbar   Left   Intact: light touch    Right   Intact: light touch    Active Range of Motion     Lumbar   Flexion: Active lumbar flexion: tips to ankles  Extension:  WFL  Left lateral flexion:  WFL  Right lateral flexion:  WFL and with pain    Additional Active Range of Motion Details  Hamstring flex: 80° bilateral    Strength/Myotome Testing     Additional Strength Details  Core/abdominals:4-4+/5  BLE's WFL    Tests     Lumbar     Left   Negative passive SLR  Right   Negative passive SLR  Left Hip   Negative GERARDO and FADIR  Right Hip   Negative GERARDO and FADIR       Ambulation   Weight-Bearing Status   Assistive device used: none    Ambulation: Level Surfaces   Ambulation without assistive device: independent    Ambulation: Stairs   Pattern: non-reciprocal  Railings: one rail  Pattern: non-reciprocal  Railings: one rail    General Comments:    Lower quarter screen   Hips: unremarkable  Knees: unremarkable  Foot/ankle: unremarkable    Knee Comments  Patient c/o DJD both knees but WFL for ambulation and ADL's             Precautions: thyroid disease, R wrist sx, HTN, B knee DJD, DM,  Daily Treatment Diary     Date 10/28  10/6 10/10 10/13 10/17 10/20 10/25   Water Walk (FW, BW, side) x10’  10  10' 10' 10 10 10 10   LE work at wall               Hip FF/ext swing  2  2' 2 2 2 2 2     Toe/heel  1  1' 1 1 1 1 1     Hip ABD/ADD  2  2' 2 2 2 2 2     March 1  1' 1 1 1 1 1     Knee flexion & extension 1  1' 1 1 1 1 1     Squats 1  1' 1 1 1 1 1   UE noodle x3             Push/pull    1' 1 1 1       Rotate  1  1' 1 1 1 1 1     Row forward & back              OH side bend            UE/Core (AROM, paddles, MB)     OB   CB      ABD/ADD  1   1 1 1 1 1     Horizontal Pec Fly  1   1 1 1 1 1     Alt   arm swing (shld flex/ext)  1   1 1 1 1 1     Push pull  1   1 1 1 1 1     Single paddle rotation           SLS (EO, EC, ball toss)            Aqua Step (FW, lat, eccentric)            Core work:              MF press (blue) 5" 10x  5''x10 ea Red?  blue5"x10 bk 5''x5 bl5''x10 ea x10 ea     Kickboard press ( red)  10" 10x  10''x5   5"x10 10''x10       Row/ext w/ tubing ( blue) 2'  2'  2 2 2' bk 2 clarisse 2   Seated on bench             ankle DF/PF              March              ABD/ADD              Knee flexion/extension            DW TX - hang in the deep water  10  10' 10 10 10 10 10     DW ABD/ADD              DW Bicycle  5  5' 6 5 5 6 6     DW Scissor hip flexion/extension            Stretches              HS/calf  2  4' 4 2 2 2 2     Wall stretches 2    2 2 2 2     Other:            Hot Tub - 10 minutes only  10  10' 10 10 10 10 10

## 2022-10-31 ENCOUNTER — OFFICE VISIT (OUTPATIENT)
Dept: PHYSICAL THERAPY | Age: 67
End: 2022-10-31

## 2022-10-31 DIAGNOSIS — M54.50 CHRONIC BILATERAL LOW BACK PAIN WITHOUT SCIATICA: ICD-10-CM

## 2022-10-31 DIAGNOSIS — G89.4 CHRONIC PAIN SYNDROME: ICD-10-CM

## 2022-10-31 DIAGNOSIS — M51.36 LUMBAR DEGENERATIVE DISC DISEASE: Primary | ICD-10-CM

## 2022-10-31 DIAGNOSIS — M48.061 SPINAL STENOSIS OF LUMBAR REGION WITHOUT NEUROGENIC CLAUDICATION: ICD-10-CM

## 2022-10-31 DIAGNOSIS — G89.29 CHRONIC BILATERAL LOW BACK PAIN WITHOUT SCIATICA: ICD-10-CM

## 2022-10-31 NOTE — PROGRESS NOTES
Daily Note     Today's date: 10/31/2022  Patient name: Gretchen Mcmahon  : 1955  MRN: [id number]  Referring provider: TAMELA Connelly  Dx:   Encounter Diagnosis     ICD-10-CM    1  Lumbar degenerative disc disease  M51 36    2  Chronic bilateral low back pain without sciatica  M54 50     G89 29    3  Spinal stenosis of lumbar region without neurogenic claudication  M48 061    4  Chronic pain syndrome  G89 4        Start Time: 1100  Stop Time: 1200  Total time in clinic (min): 60 minutes    Subjective: pt notes she did a lot over the weekend and noted B HS tightness/pain so she took some pain meds  Objective: See treatment diary below      Assessment: Tolerated treatment well  Challenged pt today w/ ride Noodle like a horse for DW bike to focus on endurance and core  Pt did well overall no LOB on noodle  Also added 3# ankle weights on for DWTX today  Patient would benefit from continued PT      Plan: Continue per plan of care  Precautions: thyroid disease, R wrist sx, HTN, B knee DJD, DM,  Daily Treatment Diary     Date 10/28 10/31  10/10 10/13 10/17 10/20 10/25   Water Walk (FW, BW, side) x10’  10 10 10' 10' 10 10 10 10   LE work at wall               Hip FF/ext swing  2 2 2' 2 2 2 2 2     Toe/heel  1 1 1' 1 1 1 1 1     Hip ABD/ADD  2 2 2' 2 2 2 2 2     March 1 1 1' 1 1 1 1 1     Knee flexion & extension 1 1 1' 1 1 1 1 1     Squats 1 1 1' 1 1 1 1 1   UE noodle x3             Push/pull    1' 1 1 1       Rotate  1 1 1' 1 1 1 1 1     Row forward & back              OH side bend            UE/Core (AROM, paddles, MB)   cb  OB   CB      ABD/ADD  1 1  1 1 1 1 1     Horizontal Pec Fly  1 1  1 1 1 1 1     Alt   arm swing (shld flex/ext)  1 1  1 1 1 1 1     Push pull  1 1  1 1 1 1 1     Single paddle rotation           SLS (EO, EC, ball toss)            Aqua Step (FW, lat, eccentric)            Core work:              MF press (blue) 5" 10x 10''x5ea 5''x10 ea Red?  blue5"x10 bk 5''x5 bl5''x10 ea x10 ea     Kickboard press ( red)  10" 10x  10''x5   5"x10 10''x10       Row/ext w/ tubing ( blue) 2' 2 2'  2 2 2' bk 2 clarisse 2   Seated on bench             ankle DF/PF              March              ABD/ADD              Knee flexion/extension            DW TX - hang in the deep water  10 10 10' 10 10 10 10 10     DW ABD/ADD              DW Bicycle  5 8' ride N 5' 6 5 5 6 6     DW Scissor hip flexion/extension            Stretches              HS/calf  2 2 4' 4 2 2 2 2     Wall stretches 2 2   2 2 2 2     Other:            Hot Tub - 10 minutes only  10 10 10' 10 10 10 10 10

## 2022-11-03 ENCOUNTER — OFFICE VISIT (OUTPATIENT)
Dept: PHYSICAL THERAPY | Age: 67
End: 2022-11-03

## 2022-11-03 DIAGNOSIS — M48.061 SPINAL STENOSIS OF LUMBAR REGION WITHOUT NEUROGENIC CLAUDICATION: ICD-10-CM

## 2022-11-03 DIAGNOSIS — G89.29 CHRONIC BILATERAL LOW BACK PAIN WITHOUT SCIATICA: ICD-10-CM

## 2022-11-03 DIAGNOSIS — M51.36 LUMBAR DEGENERATIVE DISC DISEASE: Primary | ICD-10-CM

## 2022-11-03 DIAGNOSIS — G89.4 CHRONIC PAIN SYNDROME: ICD-10-CM

## 2022-11-03 DIAGNOSIS — M54.50 CHRONIC BILATERAL LOW BACK PAIN WITHOUT SCIATICA: ICD-10-CM

## 2022-11-03 NOTE — PROGRESS NOTES
Daily Note     Today's date: 11/3/2022  Patient name: Lakeshia Beckman  : 1955  MRN: [id number]  Referring provider: TAMELA Sanchez  Dx:   Encounter Diagnosis     ICD-10-CM    1  Lumbar degenerative disc disease  M51 36    2  Chronic bilateral low back pain without sciatica  M54 50     G89 29    3  Spinal stenosis of lumbar region without neurogenic claudication  M48 061    4  Chronic pain syndrome  G89 4        Start Time: 1400  Stop Time: 1500  Total time in clinic (min): 60 minutes    Subjective: pt notes back was OK w/ cleaning the house but c/o R post knee pain and HS tightness that grabs her  Objective: See treatment diary below      Assessment: Tolerated treatment well  Continued w/ posture and core ex's using resistive bands, paddles and a noodle  Progressing well w/ all ex's  Good understanding of all ex's and stretches  Good relief after session using AW for DWTX  Patient would benefit from continued PT      Plan: Continue per plan of care  Precautions: thyroid disease, R wrist sx, HTN, B knee DJD, DM,  Daily Treatment Diary     Date 10/28 10/31 11/3  10/13 10/17 10/20 10/25   Water Walk (FW, BW, side) x10’  10 10 10' 10' 10 10 10 10   LE work at wall               Hip FF/ext swing  2 2 2' 2 2 2 2 2     Toe/heel  1 1 1' 1 1 1 1 1     Hip ABD/ADD  2 2 2' 2 2 2 2 2     March 1 1 1' 1 1 1 1 1     Knee flexion & extension 1 1 1' 1 1 1 1 1     Squats 1 1 1' 1 1 1 1 1   UE noodle x3             Push/pull     1 1 1       Rotate  1 1 1' 1 1 1 1 1     Row forward & back              OH side bend            UE/Core (AROM, paddles, MB)   cb  OB   CB      ABD/ADD  1 1 1 1 1 1 1 1     Horizontal Pec Fly  1 1 1 1 1 1 1 1     Alt  arm swing (shld flex/ext)  1 1 1 1 1 1 1 1     Push pull  1 1 1 1 1 1 1 1     Single paddle rotation           SLS (EO, EC, ball toss)            Aqua Step (FW, lat, eccentric)            Core work:              MF press (blue) 5" 10x 10''x5ea 10''x10 ea Red? blue5"x10 bk 5''x5 bl5''x10 ea x10 ea     Kickboard press ( red)  10" 10x    5"x10 10''x10       Row/ext w/ tubing ( blue) 2' 2 2'  2 2 2' bk 2 clarisse 2   Seated on bench             ankle DF/PF              March              ABD/ADD              Knee flexion/extension            DW TX - hang in the deep water  10 10 3# 10' 3# 10 10 10 10 10     DW ABD/ADD              DW Bicycle  5 8' ride N 8 6 5 5 6 6     DW Scissor hip flexion/extension            Stretches              HS/calf  2 2 2 4 2 2 2 2     Wall stretches 2 2 2  2 2 2 2     Other:            Hot Tub - 10 minutes only  10 10 10' 10 10 10 10 10

## 2022-11-07 ENCOUNTER — OFFICE VISIT (OUTPATIENT)
Dept: PHYSICAL THERAPY | Age: 67
End: 2022-11-07

## 2022-11-07 DIAGNOSIS — G89.4 CHRONIC PAIN SYNDROME: ICD-10-CM

## 2022-11-07 DIAGNOSIS — G89.29 CHRONIC BILATERAL LOW BACK PAIN WITHOUT SCIATICA: ICD-10-CM

## 2022-11-07 DIAGNOSIS — M54.50 CHRONIC BILATERAL LOW BACK PAIN WITHOUT SCIATICA: ICD-10-CM

## 2022-11-07 DIAGNOSIS — M51.36 LUMBAR DEGENERATIVE DISC DISEASE: Primary | ICD-10-CM

## 2022-11-07 DIAGNOSIS — M48.061 SPINAL STENOSIS OF LUMBAR REGION WITHOUT NEUROGENIC CLAUDICATION: ICD-10-CM

## 2022-11-07 NOTE — PROGRESS NOTES
Daily Note     Today's date: 2022  Patient name: Crista Dietrich  : 1955  MRN: [id number]  Referring provider: TAMELA Yan  Dx:   Encounter Diagnosis     ICD-10-CM    1  Lumbar degenerative disc disease  M51 36    2  Chronic bilateral low back pain without sciatica  M54 50     G89 29    3  Spinal stenosis of lumbar region without neurogenic claudication  M48 061    4  Chronic pain syndrome  G89 4        Start Time: 1500  Stop Time: 1600  Total time in clinic (min): 60 minutes    Subjective: pt notes back was OK w/ cleaning the house but c/o R post knee pain and HS tightness that grabs her  Objective: See treatment diary below      Assessment: Tolerated treatment well  Continued w/ posture and core ex's using resistive bands, paddles and a noodle  Progressing well w/ all ex's  Good understanding of all ex's and stretches  Good relief after session using AW for DWTX  Patient would benefit from continued PT      Plan: Continue per plan of care  Precautions: thyroid disease, R wrist sx, HTN, B knee DJD, DM,  Daily Treatment Diary     Date 10/28 10/31 11/3 11/7 10/13 10/17 10/20 10/25   Water Walk (FW, BW, side) x10’  10 10 10' 10' 10 10 10 10   LE work at wall               Hip FF/ext swing  2 2 2' 2 2 2 2 2     Toe/heel  1 1 1' 1 1 1 1 1     Hip ABD/ADD  2 2 2' 2 2 2 2 2     March 1 1 1' 1 1 1 1 1     Knee flexion & extension 1 1 1' 1 1 1 1 1     Squats 1 1 1' 1 1 1 1 1   UE noodle x3             Push/pull      1 1       Rotate  1 1 1' 1 1 1 1 1     Row forward & back              OH side bend            UE/Core (AROM, paddles, MB)   cb  CB   CB      ABD/ADD  1 1 1 D/c 1 1 1 1     Horizontal Pec Fly  1 1 1 1 1 1 1 1     Alt   arm swing (shld flex/ext)  1 1 1 1 1 1 1 1     Push pull  1 1 1 1 1 1 1 1     Single paddle rotation           SLS (EO, EC, ball toss)            Aqua Step (FW, lat, eccentric)            Core work:              MF press (blue) 5" 10x 10''x5ea 10''x10 ea x10 ea bk blue5"x10 bk 5''x5 bl5''x10 ea x10 ea     Kickboard press ( red)  10" 10x    5"x10 10''x10       Row/ext w/ tubing ( blue) 2' 2 2'  2 bk 2 2' bk 2 clarisse 2   Seated on bench             ankle DF/PF              March              ABD/ADD              Knee flexion/extension            DW TX - hang in the deep water  10 10 3# 10' 3# 10 5# 10 10 10 10     DW ABD/ADD              DW Bicycle  5 8' ride N 8 8' 5 5 6 6     DW Scissor hip flexion/extension            Stretches              HS/calf  2 2 2 2 2 2 2 2     Wall stretches 2 2 2 2 2 2 2 2     Other:            Hot Tub - 10 minutes only  10 10 10' 10 10 10 10 10

## 2022-11-10 ENCOUNTER — OFFICE VISIT (OUTPATIENT)
Dept: PHYSICAL THERAPY | Age: 67
End: 2022-11-10

## 2022-11-10 DIAGNOSIS — G89.29 CHRONIC BILATERAL LOW BACK PAIN WITHOUT SCIATICA: ICD-10-CM

## 2022-11-10 DIAGNOSIS — G89.4 CHRONIC PAIN SYNDROME: ICD-10-CM

## 2022-11-10 DIAGNOSIS — M48.061 SPINAL STENOSIS OF LUMBAR REGION WITHOUT NEUROGENIC CLAUDICATION: ICD-10-CM

## 2022-11-10 DIAGNOSIS — M51.36 LUMBAR DEGENERATIVE DISC DISEASE: Primary | ICD-10-CM

## 2022-11-10 DIAGNOSIS — M54.50 CHRONIC BILATERAL LOW BACK PAIN WITHOUT SCIATICA: ICD-10-CM

## 2022-11-10 NOTE — PROGRESS NOTES
Daily Note     Today's date: 11/10/2022  Patient name: Linda Mixon  : 1955  MRN: [id number]  Referring provider: TAMELA Bartlett  Dx:   Encounter Diagnosis     ICD-10-CM    1  Lumbar degenerative disc disease  M51 36    2  Chronic bilateral low back pain without sciatica  M54 50     G89 29    3  Spinal stenosis of lumbar region without neurogenic claudication  M48 061    4  Chronic pain syndrome  G89 4        Start Time: 1400  Stop Time: 1500  Total time in clinic (min): 60 minutes    Subjective: pt notes she had some HS grabbing yesterday w/ her cleaning but she took Aleve and notes feeling good today  Objective: See treatment diary below      Assessment: Tolerated treatment well  R wrist was painful today w/ band ex's, modified as needed  Pt has a good understanding of all ex's and will do well in our aquatic fitness program offered here  Pt is showing improved balance overall w/ ex's and activities at home  Plan: discharge pt to aquatic stepping down program       Precautions: thyroid disease, R wrist sx, HTN, B knee DJD, DM,  Daily Treatment Diary     Date 10/28 10/31 11/3 11/7 11/10  10/20 10/25   Water Walk (FW, BW, side) x10’  10 10 10' 10' 10 10 10 10   LE work at wall               Hip FF/ext swing  2 2 2' 2 2 2 2 2     Toe/heel  1 1 1' 1 1 1 1 1     Hip ABD/ADD  2 2 2' 2 2 2 2 2     March 1 1 1' 1 1 1 1 1     Knee flexion & extension 1 1 1' 1 1 1 1 1     Squats 1 1 1' 1 1 1 1 1   UE noodle x3             Push/pull       1       Rotate  1 1 1' 1 1 1 1 1     Row forward & back              OH side bend            UE/Core (AROM, paddles, MB)   cb  CB   CB      ABD/ADD  1 1 1 D/c  1 1 1     Horizontal Pec Fly  1 1 1 1 1 1 1 1     Alt   arm swing (shld flex/ext)  1 1 1 1 1 1 1 1     Push pull  1 1 1 1 1 1 1 1     Single paddle rotation     1      SLS (EO, EC, ball toss)            Aqua Step (FW, lat, eccentric)            Core work:              MF press (blue) 5" 10x 10''x5ea 10''x10 ea x10 ea bk bk 10'"x10 bk 5''x5 bl5''x10 ea x10 ea     Kickboard press ( red)  10" 10x    10''x10 10''x10       Row/ext w/ tubing ( blue) 2' 2 2'  2 bk 2 2' bk 2 clarisse 2   Seated on bench             ankle DF/PF              March              ABD/ADD              Knee flexion/extension            DW TX - hang in the deep water  10 10 3# 10' 3# 10 5# 10 5# 10 10 10     DW ABD/ADD              DW Bicycle  5 8' ride N 8 8' 8 5 6 6     DW Scissor hip flexion/extension            Stretches              HS/calf  2 2 2 2 2 2 2 2     Wall stretches 2 2 2 2 2 2 2 2     Other:            Hot Tub - 10 minutes only  10 10 10' 10 10 10 10 10

## 2022-11-10 NOTE — PROGRESS NOTES
Patient has been seeing a lot of improvement in LB, function and balance  She was able to stand to can applesauce and even lift the pot  Patient is happy with progress  Agreeable to discharge  All PT goals met  D/c PT at this time

## 2022-11-15 DIAGNOSIS — I10 ESSENTIAL HYPERTENSION: ICD-10-CM

## 2022-11-15 RX ORDER — LOSARTAN POTASSIUM 100 MG/1
100 TABLET ORAL DAILY
Qty: 90 TABLET | Refills: 1 | Status: SHIPPED | OUTPATIENT
Start: 2022-11-15

## 2022-11-15 RX ORDER — HYDROCHLOROTHIAZIDE 12.5 MG/1
12.5 TABLET ORAL DAILY
Qty: 90 TABLET | Refills: 1 | Status: SHIPPED | OUTPATIENT
Start: 2022-11-15

## 2022-11-30 ENCOUNTER — OFFICE VISIT (OUTPATIENT)
Dept: PAIN MEDICINE | Facility: CLINIC | Age: 67
End: 2022-11-30

## 2022-11-30 VITALS
BODY MASS INDEX: 36.64 KG/M2 | WEIGHT: 214.6 LBS | HEART RATE: 62 BPM | HEIGHT: 64 IN | DIASTOLIC BLOOD PRESSURE: 83 MMHG | SYSTOLIC BLOOD PRESSURE: 149 MMHG

## 2022-11-30 DIAGNOSIS — G89.4 CHRONIC PAIN SYNDROME: Primary | ICD-10-CM

## 2022-11-30 DIAGNOSIS — M54.50 CHRONIC BILATERAL LOW BACK PAIN WITHOUT SCIATICA: ICD-10-CM

## 2022-11-30 DIAGNOSIS — M51.36 LUMBAR DEGENERATIVE DISC DISEASE: ICD-10-CM

## 2022-11-30 DIAGNOSIS — M48.061 SPINAL STENOSIS OF LUMBAR REGION WITHOUT NEUROGENIC CLAUDICATION: ICD-10-CM

## 2022-11-30 DIAGNOSIS — G89.29 CHRONIC BILATERAL LOW BACK PAIN WITHOUT SCIATICA: ICD-10-CM

## 2022-11-30 NOTE — PROGRESS NOTES
Assessment:  1  Chronic pain syndrome    2  Chronic bilateral low back pain without sciatica    3  Lumbar degenerative disc disease    4  Spinal stenosis of lumbar region without neurogenic claudication        Plan:  While the patient was in the office today, I did have a thorough conversation regarding their chronic pain syndrome, medication management, and treatment plan options  Patient is being seen for follow-up visit  Overall, she continues to do very well  She completed a course of aqua therapy and continues with the maintenance program   Pain is very minimal   She uses NSAIDs on a strictly as needed basis  We discussed the importance of a lifelong commitment to daily exercises geared toward lumbar core stretching and strengthening  The patient was agreeable and verbalized an understanding  I discussed with the patient that at this point time she can follow up with our office on an as-needed basis  I did review the patient that if her pain symptoms should change, worsen, and/or if she would experience any new symptoms as she would like to be evaluated for, she should give our office a call  The patient was agreeable and verbalized an understanding  History of Present Illness: The patient is a 69 y o  female who presents for a follow up office visit in regards to Back Pain and Leg Pain  The patient’s current symptoms include complaints of low back and bilateral leg pain  Current pain level is a 2/10  Quality pain is described as dull and aching  Current pain medications includes:  Uses Aleve as needed       I have personally reviewed and/or updated the patient's past medical history, past surgical history, family history, social history, current medications, allergies, and vital signs today  Review of Systems  Review of Systems   Constitutional: Negative for unexpected weight change  HENT: Negative for hearing loss  Eyes: Negative for visual disturbance     Respiratory: Negative for shortness of breath  Cardiovascular: Negative for leg swelling  Gastrointestinal: Negative for constipation  Endocrine: Negative for polyuria  Genitourinary: Negative for difficulty urinating  Musculoskeletal: Negative for gait problem, joint swelling and myalgias  Skin: Negative for rash  Neurological: Negative for weakness and headaches  Psychiatric/Behavioral: Negative for decreased concentration  All other systems reviewed and are negative          Past Medical History:   Diagnosis Date   • Allergic    • Disease of thyroid gland    • Hypertension        Past Surgical History:   Procedure Laterality Date   • ENDOSCOPIC RELEASE TRANSVERSE CARPAL LIGAMENT OF HAND Right        Family History   Problem Relation Age of Onset   • Thyroid disease Mother    • Cancer Father    • No Known Problems Daughter    • No Known Problems Maternal Grandmother    • No Known Problems Paternal Grandmother    • Breast cancer Paternal Aunt         in her 29's   • No Known Problems Paternal Aunt    • No Known Problems Paternal Aunt    • No Known Problems Paternal Aunt        Social History     Occupational History   • Occupation: PTA     Comment: Retired   Tobacco Use   • Smoking status: Former     Packs/day: 0 25     Years: 15 00     Pack years: 3 75     Types: Cigarettes   • Smokeless tobacco: Never   Substance and Sexual Activity   • Alcohol use: Yes     Comment: Socially   • Drug use: Never   • Sexual activity: Not Currently         Current Outpatient Medications:   •  Cholecalciferol (VITAMIN D3) 50 MCG (2000 UT) TABS, Take 2,000 Units by mouth daily, Disp: , Rfl:   •  Coenzyme Q10-Fish Oil-Vit E (CO-Q 10 Omega-3 Fish Oil) CAPS, Take by mouth, Disp: , Rfl:   •  diclofenac (VOLTAREN) 75 mg EC tablet, Take 1 tablet (75 mg total) by mouth 2 (two) times a day (Patient taking differently: Take 75 mg by mouth 2 (two) times a day PRN), Disp: 60 tablet, Rfl: 2  •  hydrochlorothiazide (HYDRODIURIL) 12 5 mg tablet, Take 1 tablet (12 5 mg total) by mouth daily, Disp: 90 tablet, Rfl: 1  •  levothyroxine 88 mcg tablet, Take 1 tablet (88 mcg total) by mouth daily, Disp: 90 tablet, Rfl: 1  •  losartan (COZAAR) 100 MG tablet, Take 1 tablet (100 mg total) by mouth daily, Disp: 90 tablet, Rfl: 1  •  metFORMIN (GLUCOPHAGE) 500 mg tablet, Take 1 tablet (500 mg total) by mouth daily with breakfast, Disp: 90 tablet, Rfl: 1  •  Multiple Vitamin (MULTIVITAMIN) tablet, Take 1 tablet by mouth daily, Disp: , Rfl:   •  pravastatin (PRAVACHOL) 20 mg tablet, Take 1 tablet (20 mg total) by mouth daily, Disp: 90 tablet, Rfl: 1    Allergies   Allergen Reactions   • Penicillins        Physical Exam:    /83   Pulse 62   Ht 5' 4" (1 626 m)   Wt 97 3 kg (214 lb 9 6 oz)   BMI 36 84 kg/m²     Constitutional:normal, well developed, well nourished, alert, in no distress and non-toxic and no overt pain behavior  Eyes:anicteric  HEENT:grossly intact  Neck:supple, symmetric, trachea midline and no masses   Pulmonary:even and unlabored  Cardiovascular:No edema or pitting edema present  Skin:Normal without rashes or lesions and well hydrated  Psychiatric:Mood and affect appropriate  Neurologic:Cranial Nerves II-XII grossly intact  Musculoskeletal:normal    Imaging  No orders to display       No orders of the defined types were placed in this encounter

## 2022-12-20 DIAGNOSIS — E11.65 TYPE 2 DIABETES MELLITUS WITH HYPERGLYCEMIA, WITHOUT LONG-TERM CURRENT USE OF INSULIN (HCC): ICD-10-CM

## 2023-01-21 DIAGNOSIS — E78.5 HYPERLIPIDEMIA, UNSPECIFIED HYPERLIPIDEMIA TYPE: ICD-10-CM

## 2023-01-21 DIAGNOSIS — E03.9 ACQUIRED HYPOTHYROIDISM: ICD-10-CM

## 2023-01-22 DIAGNOSIS — I10 ESSENTIAL HYPERTENSION: ICD-10-CM

## 2023-01-23 RX ORDER — PRAVASTATIN SODIUM 20 MG
20 TABLET ORAL DAILY
Qty: 90 TABLET | Refills: 1 | Status: SHIPPED | OUTPATIENT
Start: 2023-01-23

## 2023-01-23 RX ORDER — HYDROCHLOROTHIAZIDE 12.5 MG/1
12.5 TABLET ORAL DAILY
Qty: 90 TABLET | Refills: 0 | Status: SHIPPED | OUTPATIENT
Start: 2023-01-23

## 2023-01-23 RX ORDER — LEVOTHYROXINE SODIUM 88 UG/1
88 TABLET ORAL DAILY
Qty: 90 TABLET | Refills: 1 | Status: SHIPPED | OUTPATIENT
Start: 2023-01-23

## 2023-01-23 RX ORDER — LOSARTAN POTASSIUM 100 MG/1
100 TABLET ORAL DAILY
Qty: 90 TABLET | Refills: 0 | Status: SHIPPED | OUTPATIENT
Start: 2023-01-23

## 2023-03-07 ENCOUNTER — APPOINTMENT (OUTPATIENT)
Dept: RADIOLOGY | Facility: CLINIC | Age: 68
End: 2023-03-07

## 2023-03-07 ENCOUNTER — OFFICE VISIT (OUTPATIENT)
Dept: INTERNAL MEDICINE CLINIC | Facility: CLINIC | Age: 68
End: 2023-03-07

## 2023-03-07 VITALS
WEIGHT: 204.25 LBS | TEMPERATURE: 98 F | HEART RATE: 63 BPM | DIASTOLIC BLOOD PRESSURE: 76 MMHG | SYSTOLIC BLOOD PRESSURE: 146 MMHG | OXYGEN SATURATION: 97 % | BODY MASS INDEX: 34.87 KG/M2 | HEIGHT: 64 IN

## 2023-03-07 DIAGNOSIS — E66.01 OBESITY, MORBID (HCC): ICD-10-CM

## 2023-03-07 DIAGNOSIS — Z12.31 SCREENING MAMMOGRAM FOR BREAST CANCER: ICD-10-CM

## 2023-03-07 DIAGNOSIS — M25.562 ACUTE PAIN OF LEFT KNEE: ICD-10-CM

## 2023-03-07 DIAGNOSIS — E11.65 TYPE 2 DIABETES MELLITUS WITH HYPERGLYCEMIA, WITHOUT LONG-TERM CURRENT USE OF INSULIN (HCC): Primary | ICD-10-CM

## 2023-03-07 LAB — SL AMB POCT HEMOGLOBIN AIC: 6.1 (ref ?–6.5)

## 2023-03-07 RX ORDER — METHYLPREDNISOLONE 4 MG/1
TABLET ORAL
Qty: 21 EACH | Refills: 0 | Status: SHIPPED | OUTPATIENT
Start: 2023-03-07

## 2023-03-07 NOTE — ASSESSMENT & PLAN NOTE
· A1C controlled at 6 1  · On ARB and statin  · Foot exam performed 9/6/22  · Eye exam due, IRIS attempted but unable to complete    Lab Results   Component Value Date    HGBA1C 6 1 03/07/2023

## 2023-03-07 NOTE — PROGRESS NOTES
Name: Zack Olguin      : 1955      MRN: [id number]  Encounter Provider: Jacinto Dodson PA-C  Encounter Date: 3/7/2023   Encounter department: [address] [place] [address]  Type 2 diabetes mellitus with hyperglycemia, without long-term current use of insulin (HCC)  Assessment & Plan:  A1C controlled at 6 1  On ARB and statin  Foot exam performed 22  Eye exam due, IRIS attempted but unable to complete  Lab Results   Component Value Date    HGBA1C 6 1 2023       Orders:  -     POCT hemoglobin A1c    2  Screening mammogram for breast cancer  -     Mammo screening bilateral w 3d & cad; Future; Expected date: 2023    3  Acute pain of left knee  Assessment & Plan: Will get xray  No improvement with topical or oral anti-inflammatories  Start medrol dose pack  Orders:  -     XR knee 3 vw left non injury; Future; Expected date: 2023  -     methylPREDNISolone 4 MG tablet therapy pack; Use as directed on package    4  Obesity, morbid ([facility] [address] )      BMI Counseling: Body mass index is 35 06 kg/m²  The BMI is above normal  Nutrition recommendations include decreasing portion sizes, consuming healthier snacks and limiting drinks that contain sugar  Rationale for BMI follow-up plan is due to patient being overweight or obese  Subjective      Pt presents for routine visit  She is up to date with AWV, diabetic foot exam, labs and CRC screening  She is due for diabetic eye exam  Iris exam attempted but unable to perform  She is due for a mammogram  A1C remains nicely controlled at 6 1  She is noting issues with left knee pain  No direct injury  Symptoms x 1 month  No redness or swelling  It feels stiff and tight  She tried voltaren gel, oral voltaren as well without relief  Review of Systems   Constitutional: Negative for chills and fever     HENT: Negative for congestion, ear pain, hearing loss, postnasal drip, rhinorrhea, sinus pressure, sinus pain, sore throat and trouble swallowing  Eyes: Negative for pain and visual disturbance  Respiratory: Negative for cough, chest tightness, shortness of breath and wheezing  Cardiovascular: Negative  Negative for chest pain, palpitations and leg swelling  Gastrointestinal: Negative for abdominal pain, blood in stool, constipation, diarrhea, nausea and vomiting  Endocrine: Negative for cold intolerance, heat intolerance, polydipsia, polyphagia and polyuria  Genitourinary: Negative for difficulty urinating, dysuria, flank pain and urgency  Musculoskeletal: Positive for arthralgias  Negative for back pain, gait problem and myalgias  Skin: Negative for rash  Allergic/Immunologic: Negative  Neurological: Negative for dizziness, weakness, light-headedness and headaches  Hematological: Negative  Psychiatric/Behavioral: Negative for behavioral problems, dysphoric mood and sleep disturbance  The patient is not nervous/anxious          Current Outpatient Medications on File Prior to Visit   Medication Sig   • Cholecalciferol (VITAMIN D3) 50 MCG (2000 UT) TABS Take 2,000 Units by mouth daily   • Coenzyme Q10-Fish Oil-Vit E (CO-Q 10 Omega-3 Fish Oil) CAPS Take by mouth   • hydrochlorothiazide (HYDRODIURIL) 12 5 mg tablet Take 1 tablet (12 5 mg total) by mouth daily   • levothyroxine 88 mcg tablet Take 1 tablet (88 mcg total) by mouth daily   • losartan (COZAAR) 100 MG tablet Take 1 tablet (100 mg total) by mouth daily   • metFORMIN (GLUCOPHAGE) 500 mg tablet Take 1 tablet (500 mg total) by mouth daily with breakfast   • Multiple Vitamin (MULTIVITAMIN) tablet Take 1 tablet by mouth daily   • pravastatin (PRAVACHOL) 20 mg tablet Take 1 tablet (20 mg total) by mouth daily   • [DISCONTINUED] diclofenac (VOLTAREN) 75 mg EC tablet Take 1 tablet (75 mg total) by mouth 2 (two) times a day (Patient not taking: Reported on 3/7/2023)       Objective     /76 (BP Location: Left arm, Patient Position: Sitting)   Pulse 63   Temp 98 °F (36 7 °C)   Ht 5' 4" (1 626 m)   Wt 92 6 kg (204 lb 4 oz)   SpO2 97%   BMI 35 06 kg/m²     Physical Exam  Constitutional:       General: She is not in acute distress  Appearance: She is well-developed  She is not diaphoretic  HENT:      Head: Normocephalic and atraumatic  Right Ear: External ear normal       Left Ear: External ear normal       Nose: Nose normal       Mouth/Throat:      Pharynx: No oropharyngeal exudate  Eyes:      General: No scleral icterus  Right eye: No discharge  Left eye: No discharge  Conjunctiva/sclera: Conjunctivae normal       Pupils: Pupils are equal, round, and reactive to light  Neck:      Thyroid: No thyromegaly  Cardiovascular:      Rate and Rhythm: Normal rate and regular rhythm  Heart sounds: Normal heart sounds  No murmur heard  No friction rub  No gallop  Pulmonary:      Effort: Pulmonary effort is normal  No respiratory distress  Breath sounds: Normal breath sounds  No wheezing or rales  Abdominal:      General: Bowel sounds are normal  There is no distension  Palpations: Abdomen is soft  Tenderness: There is no abdominal tenderness  Musculoskeletal:         General: No tenderness or deformity  Normal range of motion  Cervical back: Normal range of motion and neck supple  Legs:    Skin:     General: Skin is warm and dry  Neurological:      Mental Status: She is alert and oriented to person, place, and time  Cranial Nerves: No cranial nerve deficit  Psychiatric:         Behavior: Behavior normal          Thought Content:  Thought content normal          Judgment: Judgment normal        Benita Santos PA-C

## 2023-03-23 DIAGNOSIS — E11.65 TYPE 2 DIABETES MELLITUS WITH HYPERGLYCEMIA, WITHOUT LONG-TERM CURRENT USE OF INSULIN (HCC): ICD-10-CM

## 2023-05-10 DIAGNOSIS — I10 ESSENTIAL HYPERTENSION: ICD-10-CM

## 2023-05-10 RX ORDER — HYDROCHLOROTHIAZIDE 12.5 MG/1
12.5 TABLET ORAL DAILY
Qty: 90 TABLET | Refills: 1 | Status: SHIPPED | OUTPATIENT
Start: 2023-05-10

## 2023-05-10 RX ORDER — LOSARTAN POTASSIUM 100 MG/1
100 TABLET ORAL DAILY
Qty: 90 TABLET | Refills: 1 | Status: SHIPPED | OUTPATIENT
Start: 2023-05-10

## 2023-07-07 DIAGNOSIS — E03.9 ACQUIRED HYPOTHYROIDISM: ICD-10-CM

## 2023-07-07 DIAGNOSIS — E78.5 HYPERLIPIDEMIA, UNSPECIFIED HYPERLIPIDEMIA TYPE: ICD-10-CM

## 2023-07-07 RX ORDER — PRAVASTATIN SODIUM 20 MG
TABLET ORAL
Qty: 90 TABLET | Refills: 1 | Status: SHIPPED | OUTPATIENT
Start: 2023-07-07

## 2023-07-07 RX ORDER — LEVOTHYROXINE SODIUM 88 UG/1
TABLET ORAL
Qty: 90 TABLET | Refills: 1 | Status: SHIPPED | OUTPATIENT
Start: 2023-07-07

## 2023-07-31 ENCOUNTER — HOSPITAL ENCOUNTER (OUTPATIENT)
Dept: MAMMOGRAPHY | Facility: HOSPITAL | Age: 68
Discharge: HOME | End: 2023-07-31
Payer: MEDICARE

## 2023-07-31 DIAGNOSIS — Z12.31 SCREENING MAMMOGRAM FOR BREAST CANCER: ICD-10-CM

## 2023-07-31 PROCEDURE — 77063 BREAST TOMOSYNTHESIS BI: CPT

## 2023-07-31 PROCEDURE — 77067 SCR MAMMO BI INCL CAD: CPT

## 2023-08-01 NOTE — RESULT ENCOUNTER NOTE
Luis Gomez  I reviewed your mammogram and it was was normal. We recommend repeating in one year for maintenance. Have a great day.    -Leigh Ann Rosales

## 2023-08-21 ENCOUNTER — OFFICE VISIT (OUTPATIENT)
Dept: URGENT CARE | Facility: CLINIC | Age: 68
End: 2023-08-21
Payer: MEDICARE

## 2023-08-21 VITALS
RESPIRATION RATE: 18 BRPM | DIASTOLIC BLOOD PRESSURE: 63 MMHG | HEART RATE: 70 BPM | OXYGEN SATURATION: 96 % | TEMPERATURE: 97.1 F | SYSTOLIC BLOOD PRESSURE: 138 MMHG

## 2023-08-21 DIAGNOSIS — S61.212A LACERATION OF RIGHT MIDDLE FINGER WITHOUT FOREIGN BODY WITHOUT DAMAGE TO NAIL, INITIAL ENCOUNTER: Primary | ICD-10-CM

## 2023-08-21 DIAGNOSIS — S61.210A LACERATION OF RIGHT INDEX FINGER WITHOUT FOREIGN BODY WITHOUT DAMAGE TO NAIL, INITIAL ENCOUNTER: ICD-10-CM

## 2023-08-21 PROCEDURE — 99213 OFFICE O/P EST LOW 20 MIN: CPT | Performed by: NURSE PRACTITIONER

## 2023-08-21 PROCEDURE — G0463 HOSPITAL OUTPT CLINIC VISIT: HCPCS | Performed by: NURSE PRACTITIONER

## 2023-08-21 PROCEDURE — 12002 RPR S/N/AX/GEN/TRNK2.6-7.5CM: CPT | Performed by: NURSE PRACTITIONER

## 2023-08-21 NOTE — PROGRESS NOTES
[facility]        NAME: Emmy Lange is a 69 y.o. female  : 1955    MRN: [id number]  DATE: 2023  TIME: 10:50 AM      Assessment and Plan     Laceration of right middle finger without foreign body without damage to nail, initial encounter [S61.212A]  1. Laceration of right middle finger without foreign body without damage to nail, initial encounter  Laceration repair    Laceration repair      2. Laceration of right index finger without foreign body without damage to nail, initial encounter  Laceration repair    Laceration repair        Universal Protocol:  Risks and benefits: risks, benefits and alternatives were discussed  Consent given by: patient  Time out: Immediately prior to procedure a "time out" was called to verify the correct patient, procedure, equipment, support staff and site/side marked as required. Timeout called at: 2023 10:30 AM.  Patient understanding: patient states understanding of the procedure being performed  Required items: required blood products, implants, devices, and special equipment available  Patient identity confirmed: verbally with patient    Laceration repair    Date/Time: 2023 10:30 AM    Performed by: TAMELA Aldana  Authorized by: TAMELA Aldana  Body area: upper extremity  Location details: right index finger  Laceration length: 2 cm  Foreign bodies: no foreign bodies  Tendon involvement: none  Nerve involvement: none  Vascular damage: no      Procedure Details:  Skin closure: glue and Steri-Strips (two 1/2" steri strips cut in half)  Approximation: close  Approximation difficulty: simple  Dressing: open to air. Patient tolerance: patient tolerated the procedure well with no immediate complications    Universal Protocol:  Consent: Verbal consent obtained.   Risks and benefits: risks, benefits and alternatives were discussed  Consent given by: patient  Time out: Immediately prior to procedure a "time out" was called to verify the correct patient, procedure, equipment, support staff and site/side marked as required. Timeout called at: 8/21/2023 10:30 AM.  Patient understanding: patient states understanding of the procedure being performed  Required items: required blood products, implants, devices, and special equipment available  Patient identity confirmed: verbally with patient    Laceration repair    Date/Time: 8/21/2023 10:30 AM    Performed by: TAMELA Lyons  Authorized by: TAMELA Lyons  Body area: upper extremity  Location details: right long finger  Laceration length: 1 cm  Foreign bodies: no foreign bodies      Procedure Details:  Skin closure: Steri-Strips and glue (one 1/2" steri strip)  Approximation: close  Approximation difficulty: simple  Dressing: open to air. Patient tolerance: patient tolerated the procedure well with no immediate complications          Patient Instructions   Patient Instructions   Skin Adhesive Care   WHAT YOU NEED TO KNOW:   Skin adhesive is medical glue used to close wounds. It is a substitute for staples and stitches. Skin adhesive wound closures take less time and do not require anesthesia. You have less pain and a lower risk of infection than with staples or stitches. Skin adhesive will fall off after the wound is healed. DISCHARGE INSTRUCTIONS:   Self-care:   • Keep your wound clean and dry  for 1 to 5 days. You can shower 24 hours after the skin adhesive is applied. Lightly pat your wound dry after you shower. • Do not soak  your wound in water, such as in a bath or hot tub. • Do not scrub  your wound or pick at the adhesive. This can make your wound reopen. • Do not apply ointments  to your wound. These include antibiotic and other ointments that contain petroleum jelly. These products will remove skin adhesive and reopen your wound.     Follow up with your doctor as directed:  Write down your questions so you remember to ask them during your visits. Contact your healthcare provider if:   • You have a fever. • Your wound is red and warm to touch. • You have questions or concerns about your condition or care. Return to the emergency department if:   • Your wound has fluid draining from it. • Your wound opens. © Copyright [facility] 2022 Information is for End User's use only and may not be sold, redistributed or otherwise used for commercial purposes. The above information is an  only. It is not intended as medical advice for individual conditions or treatments. Talk to your doctor, nurse or pharmacist before following any medical regimen to see if it is safe and effective for you. Steristrips   WHAT YOU NEED TO KNOW:   Steristrips are sterile pieces of medical tape used to close wounds and help the edges grow back together. Steristrips keep the wound clean and protected while it heals. Steristrips usually fall off on their own in about 7 to 10 days. DISCHARGE INSTRUCTIONS:   Return to the emergency department if:   • You have a fever. • You see red streaks on your skin starting at the wound. • Your wound has a foul smell or is draining fluid or pus. • Your wound is red, swollen, and warm to the touch. • Your wound opens or comes apart. Contact your healthcare provider if:   • The skin under and around the steristrips itches or is not comfortable. • You have questions or concerns about your condition or care. How to use steristrips:  Always wash your hands before you care for your wound. This will help prevent infection. Clean and dry the skin around your wound before you put on new steristrips. • Care for the wound as directed. Do not bathe for 24 hours. After 24 hours, wash around the area gently as directed. Pat the area dry with a clean towel, or let it air dry. Do not rub the area with a towel to dry it. Check the wound for signs of infection, such as swelling or pus.      • Only remove the steristrips if directed. Your healthcare provider may want you to remove the steristrips after a certain number of days. Do not remove them early, even if they are causing itching or discomfort. If you are directed to remove the steristrips, pull gently. You might cause the wound to open if you pull hard. Hold the skin down as you slowly and gently remove the strips. • Apply new steristrips as directed. Start at the middle of the wound. Gently bring the edges of the wound together and place the middle of the steristrip on the wound. Do not stretch the steristrip. Smooth the ends of the steristrip down onto your skin. Add more steristrips as needed for the rest of the wound. Leave small gaps between strips so you do not completely cover the wound. Fluid from the wound may build under the strips if you completely cover it. The fluid may make the strips peel. Your healthcare provider may recommend that you add steristrips down the ends of the pieces you placed across the wound. This will help keep the steristrips in place as you move. • Do not scrub or pick at the steristrips. This can cause your wound to open. Trim the edges of the strips if they start to curl. Then press the ends firmly onto your skin. Follow up with your doctor as directed:  Write down your questions so you remember to ask them during your visits. © Copyright [employer] 2022 Information is for End User's use only and may not be sold, redistributed or otherwise used for commercial purposes. The above information is an  only. It is not intended as medical advice for individual conditions or treatments. Talk to your doctor, nurse or pharmacist before following any medical regimen to see if it is safe and effective for you. Follow up with PCP in 3-5 days. Proceed to  ER if symptoms worsen.     Chief Complaint     Chief Complaint   Patient presents with   • Laceration     Cut on fingers index and middle finger on knife          History of Present Illness     Patient presents for assessment and treatment of 2 cuts on her right index and middle fingers both over the middle phalanxes, back of her hand. She was washing a serrated kitchen knife (clean, no rust) that had been used earlier in the day to cut blueberry bread for company. She notes last tetanus shot Bhavin 15, 2018; declines booster since just over 5 years and not a dirty cut which I agree is appropriate. She washed cuts, used gauze, etc last night. This morning she removed to shower and noted still bleeding some/oozing. Applied oversized band-aids and presents here. Denies taking any blood thinners. Review of Systems     Review of Systems   Skin: Positive for wound. Neurological: Negative for numbness (no new numbness; notes prior right medial nerve damage). All other systems reviewed and are negative.         Current Medications       Current Outpatient Medications:   •  Cholecalciferol (VITAMIN D3) 50 MCG (2000 UT) TABS, Take 2,000 Units by mouth daily, Disp: , Rfl:   •  Coenzyme Q10-Fish Oil-Vit E (CO-Q 10 Omega-3 Fish Oil) CAPS, Take by mouth, Disp: , Rfl:   •  hydrochlorothiazide (HYDRODIURIL) 12.5 mg tablet, Take 1 tablet (12.5 mg total) by mouth daily, Disp: 90 tablet, Rfl: 1  •  levothyroxine 88 mcg tablet, take 1 tablet by mouth once daily, Disp: 90 tablet, Rfl: 1  •  losartan (COZAAR) 100 MG tablet, Take 1 tablet (100 mg total) by mouth daily, Disp: 90 tablet, Rfl: 1  •  metFORMIN (GLUCOPHAGE) 500 mg tablet, Take 1 tablet (500 mg total) by mouth daily with breakfast, Disp: 90 tablet, Rfl: 1  •  methylPREDNISolone 4 MG tablet therapy pack, Use as directed on package, Disp: 21 each, Rfl: 0  •  Multiple Vitamin (MULTIVITAMIN) tablet, Take 1 tablet by mouth daily, Disp: , Rfl:   •  pravastatin (PRAVACHOL) 20 mg tablet, take 1 tablet by mouth once daily, Disp: 90 tablet, Rfl: 1    Current Allergies     Allergies as of 08/21/2023 - Reviewed 08/21/2023   Allergen Reaction Noted   • Penicillins  03/29/2019              The following portions of the patient's history were reviewed and updated as appropriate: allergies, current medications, past family history, past medical history, past social history, past surgical history and problem list.     Past Medical History:   Diagnosis Date   • Allergic    • Disease of thyroid gland    • Hypertension        Past Surgical History:   Procedure Laterality Date   • ENDOSCOPIC RELEASE TRANSVERSE CARPAL LIGAMENT OF HAND Right        Family History   Problem Relation Age of Onset   • Thyroid disease Mother    • Cancer Father    • No Known Problems Daughter    • No Known Problems Maternal Grandmother    • No Known Problems Paternal Grandmother    • Breast cancer Paternal Aunt         in her 29's   • No Known Problems Paternal Aunt    • No Known Problems Paternal Aunt    • No Known Problems Paternal Aunt          Medications have been verified. Objective     /63   Pulse 70   Temp (!) 97.1 °F (36.2 °C)   Resp 18   SpO2 96%   No LMP recorded. Patient is postmenopausal.           Physical Exam     Physical Exam  Vitals and nursing note reviewed. Constitutional:       General: She is not in acute distress. Appearance: Normal appearance. She is well-developed. She is not ill-appearing, toxic-appearing or diaphoretic. HENT:      Head: Normocephalic and atraumatic. Eyes:      Pupils: Pupils are equal, round, and reactive to light. Pulmonary:      Effort: Pulmonary effort is normal. No respiratory distress. Abdominal:      General: There is no distension. Palpations: Abdomen is soft. Musculoskeletal:         General: Normal range of motion. Right hand: Laceration and tenderness present. No swelling, deformity or bony tenderness. Normal range of motion. Hands:       Cervical back: Normal range of motion and neck supple.       Comments: After assessment, discussed that 8-24 hours is a large gray zone for lacerations re: ability to suture or not. Discussed that edges are starting to dry off but also that the cuts are well approximated and not very deep--would likely have each been 1-2 sutures only. Offered steri-strips and dermabond to help secure skin as it heals, explaining that the center open area would still likely have mild drying and peeling of the skin as new skin forms. Patient agreeable to plan. Skin:     General: Skin is warm and dry. Capillary Refill: Capillary refill takes less than 2 seconds. Neurological:      General: No focal deficit present. Mental Status: She is alert and oriented to person, place, and time. Psychiatric:         Mood and Affect: Mood normal.         Behavior: Behavior normal.         Thought Content:  Thought content normal.         Judgment: Judgment normal.

## 2023-08-21 NOTE — PATIENT INSTRUCTIONS
Skin Adhesive Care   WHAT YOU NEED TO KNOW:   Skin adhesive is medical glue used to close wounds. It is a substitute for staples and stitches. Skin adhesive wound closures take less time and do not require anesthesia. You have less pain and a lower risk of infection than with staples or stitches. Skin adhesive will fall off after the wound is healed. DISCHARGE INSTRUCTIONS:   Self-care:   Keep your wound clean and dry  for 1 to 5 days. You can shower 24 hours after the skin adhesive is applied. Lightly pat your wound dry after you shower. Do not soak  your wound in water, such as in a bath or hot tub. Do not scrub  your wound or pick at the adhesive. This can make your wound reopen. Do not apply ointments  to your wound. These include antibiotic and other ointments that contain petroleum jelly. These products will remove skin adhesive and reopen your wound. Follow up with your doctor as directed:  Write down your questions so you remember to ask them during your visits. Contact your healthcare provider if:   You have a fever. Your wound is red and warm to touch. You have questions or concerns about your condition or care. Return to the emergency department if:   Your wound has fluid draining from it. Your wound opens. © Copyright [employer] 2022 Information is for End User's use only and may not be sold, redistributed or otherwise used for commercial purposes. The above information is an  only. It is not intended as medical advice for individual conditions or treatments. Talk to your doctor, nurse or pharmacist before following any medical regimen to see if it is safe and effective for you. Steristrips   WHAT YOU NEED TO KNOW:   Steristrips are sterile pieces of medical tape used to close wounds and help the edges grow back together. Steristrips keep the wound clean and protected while it heals.  Steristrips usually fall off on their own in about 7 to 10 days.  DISCHARGE INSTRUCTIONS:   Return to the emergency department if:   You have a fever. You see red streaks on your skin starting at the wound. Your wound has a foul smell or is draining fluid or pus. Your wound is red, swollen, and warm to the touch. Your wound opens or comes apart. Contact your healthcare provider if:   The skin under and around the steristrips itches or is not comfortable. You have questions or concerns about your condition or care. How to use steristrips:  Always wash your hands before you care for your wound. This will help prevent infection. Clean and dry the skin around your wound before you put on new steristrips. Care for the wound as directed. Do not bathe for 24 hours. After 24 hours, wash around the area gently as directed. Pat the area dry with a clean towel, or let it air dry. Do not rub the area with a towel to dry it. Check the wound for signs of infection, such as swelling or pus. Only remove the steristrips if directed. Your healthcare provider may want you to remove the steristrips after a certain number of days. Do not remove them early, even if they are causing itching or discomfort. If you are directed to remove the steristrips, pull gently. You might cause the wound to open if you pull hard. Hold the skin down as you slowly and gently remove the strips. Apply new steristrips as directed. Start at the middle of the wound. Gently bring the edges of the wound together and place the middle of the steristrip on the wound. Do not stretch the steristrip. Smooth the ends of the steristrip down onto your skin. Add more steristrips as needed for the rest of the wound. Leave small gaps between strips so you do not completely cover the wound. Fluid from the wound may build under the strips if you completely cover it. The fluid may make the strips peel.  Your healthcare provider may recommend that you add steristrips down the ends of the pieces you placed across the wound. This will help keep the steristrips in place as you move. Do not scrub or pick at the steristrips. This can cause your wound to open. Trim the edges of the strips if they start to curl. Then press the ends firmly onto your skin. Follow up with your doctor as directed:  Write down your questions so you remember to ask them during your visits. © Copyright [employer] 2022 Information is for End User's use only and may not be sold, redistributed or otherwise used for commercial purposes. The above information is an  only. It is not intended as medical advice for individual conditions or treatments. Talk to your doctor, nurse or pharmacist before following any medical regimen to see if it is safe and effective for you.

## 2023-08-31 ENCOUNTER — RA CDI HCC (OUTPATIENT)
Dept: OTHER | Facility: HOSPITAL | Age: 68
End: 2023-08-31

## 2023-09-05 DIAGNOSIS — E11.65 TYPE 2 DIABETES MELLITUS WITH HYPERGLYCEMIA, WITHOUT LONG-TERM CURRENT USE OF INSULIN (HCC): ICD-10-CM

## 2023-09-08 ENCOUNTER — APPOINTMENT (OUTPATIENT)
Dept: LAB | Facility: CLINIC | Age: 68
End: 2023-09-08
Payer: MEDICARE

## 2023-09-08 ENCOUNTER — OFFICE VISIT (OUTPATIENT)
Dept: INTERNAL MEDICINE CLINIC | Facility: CLINIC | Age: 68
End: 2023-09-08
Payer: MEDICARE

## 2023-09-08 VITALS
SYSTOLIC BLOOD PRESSURE: 138 MMHG | BODY MASS INDEX: 30.32 KG/M2 | TEMPERATURE: 97.4 F | OXYGEN SATURATION: 97 % | HEART RATE: 65 BPM | WEIGHT: 177.6 LBS | HEIGHT: 64 IN | DIASTOLIC BLOOD PRESSURE: 78 MMHG

## 2023-09-08 DIAGNOSIS — I10 ESSENTIAL HYPERTENSION: ICD-10-CM

## 2023-09-08 DIAGNOSIS — E03.9 ACQUIRED HYPOTHYROIDISM: ICD-10-CM

## 2023-09-08 DIAGNOSIS — E78.5 HYPERLIPIDEMIA, UNSPECIFIED HYPERLIPIDEMIA TYPE: ICD-10-CM

## 2023-09-08 DIAGNOSIS — Z23 ENCOUNTER FOR IMMUNIZATION: ICD-10-CM

## 2023-09-08 DIAGNOSIS — E11.65 TYPE 2 DIABETES MELLITUS WITH HYPERGLYCEMIA, WITHOUT LONG-TERM CURRENT USE OF INSULIN (HCC): Primary | ICD-10-CM

## 2023-09-08 DIAGNOSIS — E55.9 VITAMIN D DEFICIENCY: ICD-10-CM

## 2023-09-08 DIAGNOSIS — Z11.59 NEED FOR HEPATITIS C SCREENING TEST: ICD-10-CM

## 2023-09-08 DIAGNOSIS — Z00.00 MEDICARE ANNUAL WELLNESS VISIT, SUBSEQUENT: ICD-10-CM

## 2023-09-08 DIAGNOSIS — E11.65 TYPE 2 DIABETES MELLITUS WITH HYPERGLYCEMIA, WITHOUT LONG-TERM CURRENT USE OF INSULIN (HCC): ICD-10-CM

## 2023-09-08 DIAGNOSIS — Z78.0 POST-MENOPAUSAL: ICD-10-CM

## 2023-09-08 LAB
25(OH)D3 SERPL-MCNC: 37.2 NG/ML (ref 30–100)
ALBUMIN SERPL BCP-MCNC: 4.3 G/DL (ref 3.5–5)
ALP SERPL-CCNC: 60 U/L (ref 34–104)
ALT SERPL W P-5'-P-CCNC: 16 U/L (ref 7–52)
ANION GAP SERPL CALCULATED.3IONS-SCNC: 10 MMOL/L
AST SERPL W P-5'-P-CCNC: 16 U/L (ref 13–39)
BASOPHILS # BLD AUTO: 0.02 THOUSANDS/ÂΜL (ref 0–0.1)
BASOPHILS NFR BLD AUTO: 0 % (ref 0–1)
BILIRUB SERPL-MCNC: 1.02 MG/DL (ref 0.2–1)
BUN SERPL-MCNC: 10 MG/DL (ref 5–25)
CALCIUM SERPL-MCNC: 9.5 MG/DL (ref 8.4–10.2)
CHLORIDE SERPL-SCNC: 102 MMOL/L (ref 96–108)
CHOLEST SERPL-MCNC: 179 MG/DL
CO2 SERPL-SCNC: 29 MMOL/L (ref 21–32)
CREAT SERPL-MCNC: 0.62 MG/DL (ref 0.6–1.3)
CREAT UR-MCNC: 95.5 MG/DL
EOSINOPHIL # BLD AUTO: 0.08 THOUSAND/ÂΜL (ref 0–0.61)
EOSINOPHIL NFR BLD AUTO: 2 % (ref 0–6)
ERYTHROCYTE [DISTWIDTH] IN BLOOD BY AUTOMATED COUNT: 13.2 % (ref 11.6–15.1)
GFR SERPL CREATININE-BSD FRML MDRD: 92 ML/MIN/1.73SQ M
GLUCOSE P FAST SERPL-MCNC: 118 MG/DL (ref 65–99)
HCT VFR BLD AUTO: 43.4 % (ref 34.8–46.1)
HDLC SERPL-MCNC: 58 MG/DL
HGB BLD-MCNC: 13.9 G/DL (ref 11.5–15.4)
IMM GRANULOCYTES # BLD AUTO: 0.01 THOUSAND/UL (ref 0–0.2)
IMM GRANULOCYTES NFR BLD AUTO: 0 % (ref 0–2)
LDLC SERPL CALC-MCNC: 93 MG/DL (ref 0–100)
LEFT EYE DIABETIC RETINOPATHY: NORMAL
LEFT EYE IMAGE QUALITY: NORMAL
LEFT EYE MACULAR EDEMA: NORMAL
LEFT EYE OTHER RETINOPATHY: NORMAL
LYMPHOCYTES # BLD AUTO: 1.97 THOUSANDS/ÂΜL (ref 0.6–4.47)
LYMPHOCYTES NFR BLD AUTO: 42 % (ref 14–44)
MCH RBC QN AUTO: 30.9 PG (ref 26.8–34.3)
MCHC RBC AUTO-ENTMCNC: 32 G/DL (ref 31.4–37.4)
MCV RBC AUTO: 96 FL (ref 82–98)
MICROALBUMIN UR-MCNC: <7 MG/L
MICROALBUMIN/CREAT 24H UR: <7 MG/G CREATININE (ref 0–30)
MONOCYTES # BLD AUTO: 0.46 THOUSAND/ÂΜL (ref 0.17–1.22)
MONOCYTES NFR BLD AUTO: 10 % (ref 4–12)
NEUTROPHILS # BLD AUTO: 2.21 THOUSANDS/ÂΜL (ref 1.85–7.62)
NEUTS SEG NFR BLD AUTO: 46 % (ref 43–75)
NONHDLC SERPL-MCNC: 121 MG/DL
NRBC BLD AUTO-RTO: 0 /100 WBCS
PLATELET # BLD AUTO: 228 THOUSANDS/UL (ref 149–390)
PMV BLD AUTO: 10.3 FL (ref 8.9–12.7)
POTASSIUM SERPL-SCNC: 3.8 MMOL/L (ref 3.5–5.3)
PROT SERPL-MCNC: 6.6 G/DL (ref 6.4–8.4)
RBC # BLD AUTO: 4.5 MILLION/UL (ref 3.81–5.12)
RIGHT EYE DIABETIC RETINOPATHY: NORMAL
RIGHT EYE IMAGE QUALITY: NORMAL
RIGHT EYE MACULAR EDEMA: NORMAL
RIGHT EYE OTHER RETINOPATHY: NORMAL
SEVERITY (EYE EXAM): NORMAL
SL AMB POCT HEMOGLOBIN AIC: 5.8 (ref ?–6.5)
SODIUM SERPL-SCNC: 141 MMOL/L (ref 135–147)
TRIGL SERPL-MCNC: 138 MG/DL
TSH SERPL DL<=0.05 MIU/L-ACNC: 1.37 UIU/ML (ref 0.45–4.5)
WBC # BLD AUTO: 4.75 THOUSAND/UL (ref 4.31–10.16)

## 2023-09-08 PROCEDURE — 84443 ASSAY THYROID STIM HORMONE: CPT

## 2023-09-08 PROCEDURE — 82043 UR ALBUMIN QUANTITATIVE: CPT

## 2023-09-08 PROCEDURE — 85025 COMPLETE CBC W/AUTO DIFF WBC: CPT

## 2023-09-08 PROCEDURE — 83036 HEMOGLOBIN GLYCOSYLATED A1C: CPT | Performed by: PHYSICIAN ASSISTANT

## 2023-09-08 PROCEDURE — 99214 OFFICE O/P EST MOD 30 MIN: CPT | Performed by: PHYSICIAN ASSISTANT

## 2023-09-08 PROCEDURE — 86803 HEPATITIS C AB TEST: CPT

## 2023-09-08 PROCEDURE — 80053 COMPREHEN METABOLIC PANEL: CPT

## 2023-09-08 PROCEDURE — 82570 ASSAY OF URINE CREATININE: CPT

## 2023-09-08 PROCEDURE — 2028F FOOT EXAM PERFORMED: CPT | Performed by: PHYSICIAN ASSISTANT

## 2023-09-08 PROCEDURE — 36415 COLL VENOUS BLD VENIPUNCTURE: CPT

## 2023-09-08 PROCEDURE — 92250 FUNDUS PHOTOGRAPHY W/I&R: CPT | Performed by: PHYSICIAN ASSISTANT

## 2023-09-08 PROCEDURE — 80061 LIPID PANEL: CPT

## 2023-09-08 PROCEDURE — 82306 VITAMIN D 25 HYDROXY: CPT

## 2023-09-08 PROCEDURE — G0439 PPPS, SUBSEQ VISIT: HCPCS | Performed by: PHYSICIAN ASSISTANT

## 2023-09-08 NOTE — PROGRESS NOTES
Assessment and Plan:     Problem List Items Addressed This Visit        Endocrine    Acquired hypothyroidism    Relevant Orders    TSH, 3rd generation (Completed)    Type 2 diabetes mellitus with hyperglycemia, without long-term current use of insulin ([address]) - Primary       Lab Results   Component Value Date    HGBA1C 5.8 09/08/2023   A1C 9/8/23: 5.8  Foot exam performed 9/8/23  On ARB and statin  Eye exam in office with [facility] 9/8/23         Relevant Orders    [facility] Diabetic eye exam (Completed)    Albumin / creatinine urine ratio (Completed)    POCT hemoglobin A1c (Completed)    POCT hemoglobin A1c    Comprehensive metabolic panel (Completed)       Cardiovascular and Mediastinum    Essential hypertension    Relevant Orders    CBC and differential (Completed)       Other    Hyperlipidemia    Relevant Orders    Lipid panel (Completed)   Other Visit Diagnoses     Need for hepatitis C screening test        Relevant Orders    Hepatitis C Antibody (Completed)    Encounter for immunization        Post-menopausal        Relevant Orders    DXA bone density spine hip and pelvis    Vitamin D deficiency        Relevant Orders    Vitamin D 25 hydroxy (Completed)    Medicare annual wellness visit, subsequent              Urinary Incontinence Plan of Care: counseling topics discussed: use restroom every 2 hours and limiting fluid intake 3-4 hours before bed. Preventive health issues were discussed with patient, and age appropriate screening tests were ordered as noted in patient's After Visit Summary. Personalized health advice and appropriate referrals for health education or preventive services given if needed, as noted in patient's After Visit Summary. History of Present Illness:     Patient presents for a Medicare Wellness Visit    Pt presents for routine visit. Subsequent AWV also performed. She is up to date with mammogram and CRC screening.  She is due for labs, diabetic foot exam, and eye exam. A1C is nicely controlled at 5.8 and she is also doing well with her weight loss efforts. She is due for dexa. She defers prevnar vaccine. Patient Care Team:  Emely Chowdhury PA-C as PCP - General (Internal Medicine)     Review of Systems:     Review of Systems   Constitutional: Negative for chills and fever. HENT: Negative for congestion, ear pain, hearing loss, postnasal drip, rhinorrhea, sinus pressure, sinus pain, sore throat and trouble swallowing. Eyes: Negative for pain and visual disturbance. Respiratory: Negative for cough, chest tightness, shortness of breath and wheezing. Cardiovascular: Negative. Negative for chest pain, palpitations and leg swelling. Gastrointestinal: Negative for abdominal pain, blood in stool, constipation, diarrhea, nausea and vomiting. Endocrine: Negative for cold intolerance, heat intolerance, polydipsia, polyphagia and polyuria. Genitourinary: Negative for difficulty urinating, dysuria, flank pain and urgency. Musculoskeletal: Negative for arthralgias, back pain, gait problem and myalgias. Skin: Negative for rash. Allergic/Immunologic: Negative. Neurological: Negative for dizziness, weakness, light-headedness and headaches. Hematological: Negative. Psychiatric/Behavioral: Negative for behavioral problems, dysphoric mood and sleep disturbance. The patient is not nervous/anxious.          Problem List:     Patient Active Problem List   Diagnosis   • Acquired hypothyroidism   • Essential hypertension   • Hyperlipidemia   • Obesity, morbid ([address])   • Atrophy of muscle of right hand   • Right wrist pain   • Type 2 diabetes mellitus with hyperglycemia, without long-term current use of insulin (HCC)   • Allergic contact dermatitis due to plants, except food   • Acute pain of left knee   • Chronic bilateral low back pain without sciatica   • Lumbar degenerative disc disease   • Chronic pain syndrome   • Spinal stenosis of lumbar region without neurogenic claudication Past Medical and Surgical History:     Past Medical History:   Diagnosis Date   • Allergic    • Arthritis    • Disease of thyroid gland    • Hypertension    • Obesity      Past Surgical History:   Procedure Laterality Date   • ENDOSCOPIC RELEASE TRANSVERSE CARPAL LIGAMENT OF HAND Right    • TUBAL LIGATION        Family History:     Family History   Problem Relation Age of Onset   • Thyroid disease Mother    • Anxiety disorder Mother    • Cancer Father    • No Known Problems Daughter    • No Known Problems Maternal Grandmother    • No Known Problems Paternal Grandmother    • Breast cancer Paternal Aunt         in her 29's   • No Known Problems Paternal Aunt    • No Known Problems Paternal Aunt    • No Known Problems Paternal Aunt       Social History:     Social History     Socioeconomic History   • Marital status: /Civil Union     Spouse name: None   • Number of children: None   • Years of education: None   • Highest education level: None   Occupational History   • Occupation: PTA     Comment: Retired   Tobacco Use   • Smoking status: Former     Packs/day: 0.25     Years: 15.00     Total pack years: 3.75     Types: Cigarettes   • Smokeless tobacco: Never   Substance and Sexual Activity   • Alcohol use: Yes     Comment: Maybe one or 2 beer or wine per mo th, social only   • Drug use: Never   • Sexual activity: Not Currently     Birth control/protection: Post-menopausal   Other Topics Concern   • None   Social History Narrative   • None     Social Determinants of Health     Financial Resource Strain: Low Risk  (9/1/2023)    Overall Financial Resource Strain (CARDIA)    • Difficulty of Paying Living Expenses: Not very hard   Food Insecurity: Not on file   Transportation Needs: No Transportation Needs (9/1/2023)    PRAPARE - Transportation    • Lack of Transportation (Medical): No    • Lack of Transportation (Non-Medical):  No   Physical Activity: Not on file   Stress: Not on file   Social Connections: Not on file   Intimate Partner Violence: Not on file   Housing Stability: Not on file      Medications and Allergies:     Current Outpatient Medications   Medication Sig Dispense Refill   • Cholecalciferol (VITAMIN D3) 50 MCG (2000 UT) TABS Take 2,000 Units by mouth daily     • Coenzyme Q10-Fish Oil-Vit E (CO-Q 10 Omega-3 Fish Oil) CAPS Take by mouth     • hydrochlorothiazide (HYDRODIURIL) 12.5 mg tablet Take 1 tablet (12.5 mg total) by mouth daily 90 tablet 1   • levothyroxine 88 mcg tablet take 1 tablet by mouth once daily 90 tablet 1   • losartan (COZAAR) 100 MG tablet Take 1 tablet (100 mg total) by mouth daily 90 tablet 1   • metFORMIN (GLUCOPHAGE) 500 mg tablet take 1 tablet by mouth daily WITH BREAKFAST 90 tablet 1   • Multiple Vitamin (MULTIVITAMIN) tablet Take 1 tablet by mouth daily     • pravastatin (PRAVACHOL) 20 mg tablet take 1 tablet by mouth once daily 90 tablet 1   • methylPREDNISolone 4 MG tablet therapy pack Use as directed on package (Patient not taking: Reported on 9/8/2023) 21 each 0     No current facility-administered medications for this visit. Allergies   Allergen Reactions   • Penicillins       Immunizations:     Immunization History   Administered Date(s) Administered   • COVID-19 MODERNA VACC 0.5 ML IM 01/29/2021, 03/03/2021, 10/23/2021, 03/31/2022   • COVID-19 Pfizer Vac BIVALENT Ramin-sucrose 12 Yr+ IM 09/30/2022   • INFLUENZA 10/16/2019, 09/10/2020, 09/02/2021, 10/14/2022   • Tdap 01/15/2018      Health Maintenance:         Topic Date Due   • Breast Cancer Screening: Mammogram  07/31/2024   • Colorectal Cancer Screening  04/25/2025   • Hepatitis C Screening  Completed         Topic Date Due   • Pneumococcal Vaccine: 65+ Years (1 - PCV) Never done   • COVID-19 Vaccine (6 - Moderna series) 01/30/2023   • Influenza Vaccine (1) 09/01/2023      Medicare Screening Tests and Risk Assessments:     Indio Almaraz is here for her Subsequent Wellness visit.  Last Medicare Wellness visit information reviewed, patient interviewed and updates made to the record today. Health Risk Assessment:   Patient rates overall health as very good. Patient feels that their physical health rating is slightly better. Patient is satisfied with their life. Eyesight was rated as same. Hearing was rated as same. Patient feels that their emotional and mental health rating is slightly better. Patients states they are never, rarely angry. Patient states they are never, rarely unusually tired/fatigued. Pain experienced in the last 7 days has been none. Patient states that she has experienced no weight loss or gain in last 6 months. Depression Screening:   PHQ-2 Score: 0      Fall Risk Screening: In the past year, patient has experienced: no history of falling in past year      Urinary Incontinence Screening:   Patient has leaked urine accidently in the last six months. Home Safety:  Patient does not have trouble with stairs inside or outside of their home. Patient has working smoke alarms and has no working carbon monoxide detector. Home safety hazards include: none. Nutrition:   Current diet is Regular. Medications:   Patient is currently taking over-the-counter supplements. OTC medications include: Multivitamin, CoQ10 and Vit D. Patient is able to manage medications. Activities of Daily Living (ADLs)/Instrumental Activities of Daily Living (IADLs):   Walk and transfer into and out of bed and chair?: Yes  Dress and groom yourself?: Yes    Bathe or shower yourself?: Yes    Feed yourself?  Yes  Do your laundry/housekeeping?: Yes  Manage your money, pay your bills and track your expenses?: Yes  Make your own meals?: Yes    Do your own shopping?: Yes    Previous Hospitalizations:   Any hospitalizations or ED visits within the last 12 months?: No      Hospitalization Comments: Urgent care for laceration    Advance Care Planning:   Living will: No    Durable POA for healthcare: No    Advanced directive: No    Five wishes given: Yes      Cognitive Screening:   Provider or family/friend/caregiver concerned regarding cognition?: No    PREVENTIVE SCREENINGS      Cardiovascular Screening:    General: Screening Not Indicated and History Lipid Disorder      Diabetes Screening:     General: Screening Not Indicated and History Diabetes      Colorectal Cancer Screening:     General: Screening Current      Breast Cancer Screening:     General: Screening Current      Cervical Cancer Screening:    General: Screening Not Indicated      Osteoporosis Screening:      Due for: DXA Axial      Abdominal Aortic Aneurysm (AAA) Screening:        General: Screening Not Indicated      Lung Cancer Screening:     General: Screening Not Indicated      Hepatitis C Screening:    General: Screening Current    Screening, Brief Intervention, and Referral to Treatment (SBIRT)    Screening  Typical number of drinks in a day: 1  Typical number of drinks in a week: 1  Interpretation: Low risk drinking behavior. AUDIT-C Screenin) How often did you have a drink containing alcohol in the past year? 2 to 4 times a month  2) How many drinks did you have on a typical day when you were drinking in the past year? 1 to 2  3) How often did you have 6 or more drinks on one occasion in the past year? never    AUDIT-C Score: 2  Interpretation: Score 0-2 (female): Negative screen for alcohol misuse    Single Item Drug Screening:  How often have you used an illegal drug (including marijuana) or a prescription medication for non-medical reasons in the past year? never    Single Item Drug Screen Score: 0  Interpretation: Negative screen for possible drug use disorder    No results found. Physical Exam:     /78 (BP Location: Left arm, Patient Position: Sitting)   Pulse 65   Temp (!) 97.4 °F (36.3 °C) (Tympanic)   Ht 5' 4" (1.626 m)   Wt 80.6 kg (177 lb 9.6 oz)   SpO2 97%   BMI 30.48 kg/m²     Physical Exam  Vitals and nursing note reviewed.    Constitutional: Appearance: She is well-developed. HENT:      Head: Normocephalic and atraumatic. Right Ear: External ear normal.      Left Ear: External ear normal.      Nose: Nose normal.   Eyes:      Conjunctiva/sclera: Conjunctivae normal.   Cardiovascular:      Rate and Rhythm: Normal rate and regular rhythm. Pulses: no weak pulses          Dorsalis pedis pulses are 2+ on the right side and 2+ on the left side. Posterior tibial pulses are 2+ on the right side and 2+ on the left side. Heart sounds: Normal heart sounds. Pulmonary:      Effort: Pulmonary effort is normal.      Breath sounds: Normal breath sounds. Abdominal:      General: Bowel sounds are normal.      Palpations: Abdomen is soft. Musculoskeletal:         General: Normal range of motion. Cervical back: Normal range of motion and neck supple. Feet:      Right foot:      Skin integrity: No ulcer, skin breakdown, erythema, warmth, callus or dry skin. Left foot:      Skin integrity: No ulcer, skin breakdown, erythema, warmth, callus or dry skin. Skin:     General: Skin is warm and dry. Neurological:      Mental Status: She is alert and oriented to person, place, and time. Psychiatric:         Behavior: Behavior normal.         Thought Content: Thought content normal.         Judgment: Judgment normal.        Patient's shoes and socks removed. Right Foot/Ankle   Right Foot Inspection  Skin Exam: skin normal and skin intact. No dry skin, no warmth, no callus, no erythema, no maceration, no abnormal color, no pre-ulcer, no ulcer and no callus. Toe Exam: ROM and strength within normal limits. Sensory   Vibration: intact  Proprioception: intact  Monofilament testing: intact    Vascular  Capillary refills: < 3 seconds  The right DP pulse is 2+. The right PT pulse is 2+. Left Foot/Ankle  Left Foot Inspection  Skin Exam: skin normal and skin intact.  No dry skin, no warmth, no erythema, no maceration, normal color, no pre-ulcer, no ulcer and no callus. Toe Exam: ROM and strength within normal limits. Sensory   Vibration: intact  Proprioception: intact  Monofilament testing: intact    Vascular  Capillary refills: < 3 seconds  The left DP pulse is 2+. The left PT pulse is 2+.      Assign Risk Category  No deformity present  No loss of protective sensation  No weak pulses  Risk: 0      Benita Santos PA-C

## 2023-09-08 NOTE — PATIENT INSTRUCTIONS
Medicare Preventive Visit Patient Instructions  Thank you for completing your Welcome to Medicare Visit or Medicare Annual Wellness Visit today. Your next wellness visit will be due in one year (9/8/2024). The screening/preventive services that you may require over the next 5-10 years are detailed below. Some tests may not apply to you based off risk factors and/or age. Screening tests ordered at today's visit but not completed yet may show as past due. Also, please note that scanned in results may not display below. Preventive Screenings:  Service Recommendations Previous Testing/Comments   Colorectal Cancer Screening  * Colonoscopy    * Fecal Occult Blood Test (FOBT)/Fecal Immunochemical Test (FIT)  * Fecal DNA/Cologuard Test  * Flexible Sigmoidoscopy Age: 43-73 years old   Colonoscopy: every 10 years (may be performed more frequently if at higher risk)  OR  FOBT/FIT: every 1 year  OR  Cologuard: every 3 years  OR  Sigmoidoscopy: every 5 years  Screening may be recommended earlier than age 39 if at higher risk for colorectal cancer. Also, an individualized decision between you and your healthcare provider will decide whether screening between the ages of 77-80 would be appropriate. Colonoscopy: 04/25/2022  FOBT/FIT: Not on file  Cologuard: 04/25/2022  Sigmoidoscopy: Not on file    Screening Current     Breast Cancer Screening Age: 36 years old  Frequency: every 1-2 years  Not required if history of left and right mastectomy Mammogram: 07/31/2023    Screening Current   Cervical Cancer Screening Between the ages of 21-29, pap smear recommended once every 3 years. Between the ages of 32-69, can perform pap smear with HPV co-testing every 5 years.    Recommendations may differ for women with a history of total hysterectomy, cervical cancer, or abnormal pap smears in past. Pap Smear: Not on file    Screening Not Indicated   Hepatitis C Screening Once for adults born between 1945 and 1965  More frequently in patients at high risk for Hepatitis C Hep C Antibody: Not on file        Diabetes Screening 1-2 times per year if you're at risk for diabetes or have pre-diabetes Fasting glucose: 129 mg/dL (9/6/2022)  A1C: 6.1 (3/7/2023)  Screening Not Indicated  History Diabetes   Cholesterol Screening Once every 5 years if you don't have a lipid disorder. May order more often based on risk factors. Lipid panel: 09/06/2022    Screening Not Indicated  History Lipid Disorder     Other Preventive Screenings Covered by Medicare:  1. Abdominal Aortic Aneurysm (AAA) Screening: covered once if your at risk. You're considered to be at risk if you have a family history of AAA. 2. Lung Cancer Screening: covers low dose CT scan once per year if you meet all of the following conditions: (1) Age 48-67; (2) No signs or symptoms of lung cancer; (3) Current smoker or have quit smoking within the last 15 years; (4) You have a tobacco smoking history of at least 20 pack years (packs per day multiplied by number of years you smoked); (5) You get a written order from a healthcare provider. 3. Glaucoma Screening: covered annually if you're considered high risk: (1) You have diabetes OR (2) Family history of glaucoma OR (3)  aged 48 and older OR (3)  American aged 72 and older  3. Osteoporosis Screening: covered every 2 years if you meet one of the following conditions: (1) You're estrogen deficient and at risk for osteoporosis based off medical history and other findings; (2) Have a vertebral abnormality; (3) On glucocorticoid therapy for more than 3 months; (4) Have primary hyperparathyroidism; (5) On osteoporosis medications and need to assess response to drug therapy. · Last bone density test (DXA Scan): Not on file. 5. HIV Screening: covered annually if you're between the age of 14-79. Also covered annually if you are younger than 13 and older than 72 with risk factors for HIV infection.  For pregnant patients, it is covered up to 3 times per pregnancy. Immunizations:  Immunization Recommendations   Influenza Vaccine Annual influenza vaccination during flu season is recommended for all persons aged >= 6 months who do not have contraindications   Pneumococcal Vaccine   * Pneumococcal conjugate vaccine = PCV13 (Prevnar 13), PCV15 (Vaxneuvance), PCV20 (Prevnar 20)  * Pneumococcal polysaccharide vaccine = PPSV23 (Pneumovax) Adults 20-63 years old: 1-3 doses may be recommended based on certain risk factors  Adults 72 years old: 1-2 doses may be recommended based off what pneumonia vaccine you previously received   Hepatitis B Vaccine 3 dose series if at intermediate or high risk (ex: diabetes, end stage renal disease, liver disease)   Tetanus (Td) Vaccine - COST NOT COVERED BY MEDICARE PART B Following completion of primary series, a booster dose should be given every 10 years to maintain immunity against tetanus. Td may also be given as tetanus wound prophylaxis. Tdap Vaccine - COST NOT COVERED BY MEDICARE PART B Recommended at least once for all adults. For pregnant patients, recommended with each pregnancy. Shingles Vaccine (Shingrix) - COST NOT COVERED BY MEDICARE PART B  2 shot series recommended in those aged 48 and above     Health Maintenance Due:      Topic Date Due   • Hepatitis C Screening  Never done   • Breast Cancer Screening: Mammogram  07/31/2024   • Colorectal Cancer Screening  04/25/2025     Immunizations Due:      Topic Date Due   • Pneumococcal Vaccine: 65+ Years (1 - PCV) Never done   • COVID-19 Vaccine (6 - Moderna series) 01/30/2023   • Influenza Vaccine (1) 09/01/2023     Advance Directives   What are advance directives? Advance directives are legal documents that state your wishes and plans for medical care. These plans are made ahead of time in case you lose your ability to make decisions for yourself.  Advance directives can apply to any medical decision, such as the treatments you want, and if you want to donate organs. What are the types of advance directives? There are many types of advance directives, and each state has rules about how to use them. You may choose a combination of any of the following:  · Living will: This is a written record of the treatment you want. You can also choose which treatments you do not want, which to limit, and which to stop at a certain time. This includes surgery, medicine, IV fluid, and tube feedings. · Durable power of  for healthcare [facility]): This is a written record that states who you want to make healthcare choices for you when you are unable to make them for yourself. This person, called a proxy, is usually a family member or a friend. You may choose more than 1 proxy. · Do not resuscitate (DNR) order:  A DNR order is used in case your heart stops beating or you stop breathing. It is a request not to have certain forms of treatment, such as CPR. A DNR order may be included in other types of advance directives. · Medical directive: This covers the care that you want if you are in a coma, near death, or unable to make decisions for yourself. You can list the treatments you want for each condition. Treatment may include pain medicine, surgery, blood transfusions, dialysis, IV or tube feedings, and a ventilator (breathing machine). · Values history: This document has questions about your views, beliefs, and how you feel and think about life. This information can help others choose the care that you would choose. Why are advance directives important? An advance directive helps you control your care. Although spoken wishes may be used, it is better to have your wishes written down. Spoken wishes can be misunderstood, or not followed. Treatments may be given even if you do not want them. An advance directive may make it easier for your family to make difficult choices about your care.    Urinary Incontinence   Urinary incontinence (UI)  is when you lose control of your bladder. UI develops because your bladder cannot store or empty urine properly. The 3 most common types of UI are stress incontinence, urge incontinence, or both. Medicines:   · May be given to help strengthen your bladder control. Report any side effects of medication to your healthcare provider. Do pelvic muscle exercises often:  Your pelvic muscles help you stop urinating. Squeeze these muscles tight for 5 seconds, then relax for 5 seconds. Gradually work up to squeezing for 10 seconds. Do 3 sets of 15 repetitions a day, or as directed. This will help strengthen your pelvic muscles and improve bladder control. Train your bladder:  Go to the bathroom at set times, such as every 2 hours, even if you do not feel the urge to go. You can also try to hold your urine when you feel the urge to go. For example, hold your urine for 5 minutes when you feel the urge to go. As that becomes easier, hold your urine for 10 minutes. Self-care:   · Keep a UI record. Write down how often you leak urine and how much you leak. Make a note of what you were doing when you leaked urine. · Drink liquids as directed. You may need to limit the amount of liquid you drink to help control your urine leakage. Do not drink any liquid right before you go to bed. Limit or do not have drinks that contain caffeine or alcohol. · Prevent constipation. Eat a variety of high-fiber foods. Good examples are high-fiber cereals, beans, vegetables, and whole-grain breads. Walking is the best way to trigger your intestines to have a bowel movement. · Exercise regularly and maintain a healthy weight. Weight loss and exercise will decrease pressure on your bladder and help you control your leakage. · Use a catheter as directed  to help empty your bladder. A catheter is a tiny, plastic tube that is put into your bladder to drain your urine. · Go to behavior therapy as directed.   Behavior therapy may be used to help you learn to control your urge to urinate. Weight Management   Why it is important to manage your weight:  Being overweight increases your risk of health conditions such as heart disease, high blood pressure, type 2 diabetes, and certain types of cancer. It can also increase your risk for osteoarthritis, sleep apnea, and other respiratory problems. Aim for a slow, steady weight loss. Even a small amount of weight loss can lower your risk of health problems. How to lose weight safely:  A safe and healthy way to lose weight is to eat fewer calories and get regular exercise. You can lose up about 1 pound a week by decreasing the number of calories you eat by 500 calories each day. Healthy meal plan for weight management:  A healthy meal plan includes a variety of foods, contains fewer calories, and helps you stay healthy. A healthy meal plan includes the following:  · Eat whole-grain foods more often. A healthy meal plan should contain fiber. Fiber is the part of grains, fruits, and vegetables that is not broken down by your body. Whole-grain foods are healthy and provide extra fiber in your diet. Some examples of whole-grain foods are whole-wheat breads and pastas, oatmeal, brown rice, and bulgur. · Eat a variety of vegetables every day. Include dark, leafy greens such as spinach, kale, live greens, and mustard greens. Eat yellow and orange vegetables such as carrots, sweet potatoes, and winter squash. · Eat a variety of fruits every day. Choose fresh or canned fruit (canned in its own juice or light syrup) instead of juice. Fruit juice has very little or no fiber. · Eat low-fat dairy foods. Drink fat-free (skim) milk or 1% milk. Eat fat-free yogurt and low-fat cottage cheese. Try low-fat cheeses such as mozzarella and other reduced-fat cheeses. · Choose meat and other protein foods that are low in fat. Choose beans or other legumes such as split peas or lentils.  Choose fish, skinless poultry (chicken or turkey), or lean cuts of red meat (beef or pork). Before you cook meat or poultry, cut off any visible fat. · Use less fat and oil. Try baking foods instead of frying them. Add less fat, such as margarine, sour cream, regular salad dressing and mayonnaise to foods. Eat fewer high-fat foods. Some examples of high-fat foods include french fries, doughnuts, ice cream, and cakes. · Eat fewer sweets. Limit foods and drinks that are high in sugar. This includes candy, cookies, regular soda, and sweetened drinks. Exercise:  Exercise at least 30 minutes per day on most days of the week. Some examples of exercise include walking, biking, dancing, and swimming. You can also fit in more physical activity by taking the stairs instead of the elevator or parking farther away from stores. Ask your healthcare provider about the best exercise plan for you. © Copyright [employer] 2018 Information is for End User's use only and may not be sold, redistributed or otherwise used for commercial purposes.  All illustrations and images included in CareNotes® are the copyrighted property of [employer] Inc. or  [address]

## 2023-09-09 LAB — HCV AB SER QL: NORMAL

## 2023-09-11 LAB — SL AMB POCT HEMOGLOBIN AIC: 5.8 (ref ?–6.5)

## 2023-09-11 NOTE — ASSESSMENT & PLAN NOTE
Lab Results   Component Value Date    HGBA1C 5.8 09/08/2023   A1C 9/8/23: 5.8  Foot exam performed 9/8/23  On ARB and statin  Eye exam in office with IRIS 9/8/23

## 2023-10-22 DIAGNOSIS — I10 ESSENTIAL HYPERTENSION: ICD-10-CM

## 2023-10-22 RX ORDER — LOSARTAN POTASSIUM 100 MG/1
100 TABLET ORAL DAILY
Qty: 90 TABLET | Refills: 1 | Status: SHIPPED | OUTPATIENT
Start: 2023-10-22

## 2023-10-22 RX ORDER — HYDROCHLOROTHIAZIDE 12.5 MG/1
12.5 TABLET ORAL DAILY
Qty: 90 TABLET | Refills: 1 | Status: SHIPPED | OUTPATIENT
Start: 2023-10-22

## 2023-12-18 DIAGNOSIS — E78.5 HYPERLIPIDEMIA, UNSPECIFIED HYPERLIPIDEMIA TYPE: ICD-10-CM

## 2023-12-18 DIAGNOSIS — E03.9 ACQUIRED HYPOTHYROIDISM: ICD-10-CM

## 2023-12-18 RX ORDER — PRAVASTATIN SODIUM 20 MG
TABLET ORAL
Qty: 90 TABLET | Refills: 1 | Status: SHIPPED | OUTPATIENT
Start: 2023-12-18

## 2023-12-18 RX ORDER — LEVOTHYROXINE SODIUM 88 UG/1
TABLET ORAL
Qty: 90 TABLET | Refills: 1 | Status: SHIPPED | OUTPATIENT
Start: 2023-12-18

## 2023-12-19 LAB
LEFT EYE DIABETIC RETINOPATHY: NORMAL
RIGHT EYE DIABETIC RETINOPATHY: NORMAL

## 2024-01-02 DIAGNOSIS — E11.65 TYPE 2 DIABETES MELLITUS WITH HYPERGLYCEMIA, WITHOUT LONG-TERM CURRENT USE OF INSULIN (HCC): ICD-10-CM

## 2024-03-07 ENCOUNTER — RA CDI HCC (OUTPATIENT)
Dept: OTHER | Facility: HOSPITAL | Age: 69
End: 2024-03-07

## 2024-03-14 ENCOUNTER — OFFICE VISIT (OUTPATIENT)
Dept: INTERNAL MEDICINE CLINIC | Facility: CLINIC | Age: 69
End: 2024-03-14
Payer: MEDICARE

## 2024-03-14 VITALS
HEART RATE: 68 BPM | BODY MASS INDEX: 29.12 KG/M2 | OXYGEN SATURATION: 95 % | WEIGHT: 170.6 LBS | DIASTOLIC BLOOD PRESSURE: 72 MMHG | HEIGHT: 64 IN | SYSTOLIC BLOOD PRESSURE: 124 MMHG | TEMPERATURE: 98.3 F

## 2024-03-14 DIAGNOSIS — E78.5 HYPERLIPIDEMIA, UNSPECIFIED HYPERLIPIDEMIA TYPE: ICD-10-CM

## 2024-03-14 DIAGNOSIS — Z13.0 SCREENING FOR DEFICIENCY ANEMIA: ICD-10-CM

## 2024-03-14 DIAGNOSIS — Z23 ENCOUNTER FOR IMMUNIZATION: ICD-10-CM

## 2024-03-14 DIAGNOSIS — E03.9 ACQUIRED HYPOTHYROIDISM: ICD-10-CM

## 2024-03-14 DIAGNOSIS — E11.65 TYPE 2 DIABETES MELLITUS WITH HYPERGLYCEMIA, WITHOUT LONG-TERM CURRENT USE OF INSULIN (HCC): Primary | ICD-10-CM

## 2024-03-14 DIAGNOSIS — M25.562 LATERAL KNEE PAIN, LEFT: ICD-10-CM

## 2024-03-14 DIAGNOSIS — I10 ESSENTIAL HYPERTENSION: ICD-10-CM

## 2024-03-14 DIAGNOSIS — E55.9 VITAMIN D DEFICIENCY: ICD-10-CM

## 2024-03-14 LAB — SL AMB POCT HEMOGLOBIN AIC: 5.8 (ref ?–6.5)

## 2024-03-14 PROCEDURE — 83036 HEMOGLOBIN GLYCOSYLATED A1C: CPT | Performed by: PHYSICIAN ASSISTANT

## 2024-03-14 PROCEDURE — 99214 OFFICE O/P EST MOD 30 MIN: CPT | Performed by: PHYSICIAN ASSISTANT

## 2024-03-14 NOTE — ASSESSMENT & PLAN NOTE
Lab Results   Component Value Date    HGBA1C 5.8 09/08/2023   A1C 3/14/24 5.8  Foot exam performed 9/8/23  On ARB and statin  Eye exam in office with IRIS 9/8/23

## 2024-03-14 NOTE — PROGRESS NOTES
Name: Asha Navarrete      : 1955      MRN: [id number]  Encounter Provider: Benita Santos PA-C  Encounter Date: 3/14/2024   Encounter department: [facility]    Assessment & Plan     1. Type 2 diabetes mellitus with hyperglycemia, without long-term current use of insulin (HCC)  Assessment & Plan:    Lab Results   Component Value Date    HGBA1C 5.8 2023   A1C 3/14/24 5.8  Foot exam performed 23  On ARB and statin  Eye exam in office with IRIS 23    Orders:  -     POCT hemoglobin A1c  -     Comprehensive metabolic panel; Future  -     Albumin / creatinine urine ratio; Future  -     Hemoglobin A1C; Future    2. Encounter for immunization  -     Pneumococcal Conjugate Vaccine 20-valent (Pcv20)    3. Lateral knee pain, left  Assessment & Plan:  Will get MRI to better evaluate. Defers medication today    Orders:  -     MRI knee left  wo contrast; Future; Expected date: 2024    4. Hyperlipidemia, unspecified hyperlipidemia type  -     Lipid panel; Future    5. Acquired hypothyroidism  -     TSH, 3rd generation with Free T4 reflex; Future    6. Essential hypertension    7. Vitamin D deficiency  -     Vitamin D 25 hydroxy; Future    8. Screening for deficiency anemia  -     CBC and differential; Future        Depression Screening and Follow-up Plan: Patient was screened for depression during today's encounter. They screened negative with a PHQ-2 score of 0.        Subjective      Pt presents for routine visit. She is doing well overall. She is up to date with labs, CRC screening, mammogram, diabetic foot and diabetic eye exams. She is due for dexa. A1C is stable at 5.8.   She is noting issues with ongoing left knee pain x 1 year. She notes it began after doing stair exercises. Pain is along the lateral aspect and worsens with weight bearing. She had a negative xray and desires MRI. She tried aleve. She denies instability. She has been doing aqua therapy weekly.       Review  of Systems   Constitutional:  Negative for chills and fever.   HENT:  Negative for congestion, ear pain, hearing loss, postnasal drip, rhinorrhea, sinus pressure, sinus pain, sore throat and trouble swallowing.    Eyes:  Negative for pain and visual disturbance.   Respiratory:  Negative for cough, chest tightness, shortness of breath and wheezing.    Cardiovascular: Negative.  Negative for chest pain, palpitations and leg swelling.   Gastrointestinal:  Negative for abdominal pain, blood in stool, constipation, diarrhea, nausea and vomiting.   Endocrine: Negative for cold intolerance, heat intolerance, polydipsia, polyphagia and polyuria.   Genitourinary:  Negative for difficulty urinating, dysuria, flank pain and urgency.   Musculoskeletal:  Positive for arthralgias. Negative for back pain, gait problem and myalgias.   Skin:  Negative for rash.   Allergic/Immunologic: Negative.    Neurological:  Negative for dizziness, weakness, light-headedness and headaches.   Hematological: Negative.    Psychiatric/Behavioral:  Negative for behavioral problems, dysphoric mood and sleep disturbance. The patient is not nervous/anxious.        Current Outpatient Medications on File Prior to Visit   Medication Sig    Cholecalciferol (VITAMIN D3) 50 MCG (2000 UT) TABS Take 2,000 Units by mouth daily    Coenzyme Q10-Fish Oil-Vit E (CO-Q 10 Omega-3 Fish Oil) CAPS Take by mouth    levothyroxine 88 mcg tablet take 1 tablet by mouth once daily    losartan (COZAAR) 100 MG tablet take 1 tablet by mouth once daily    metFORMIN (GLUCOPHAGE) 500 mg tablet Take 1 tablet (500 mg total) by mouth daily with breakfast    Multiple Vitamin (MULTIVITAMIN) tablet Take 1 tablet by mouth daily    pravastatin (PRAVACHOL) 20 mg tablet take 1 tablet by mouth once daily    [DISCONTINUED] hydrochlorothiazide (HYDRODIURIL) 12.5 mg tablet take 1 tablet by mouth once daily    [DISCONTINUED] methylPREDNISolone 4 MG tablet therapy pack Use as directed on package  "(Patient not taking: Reported on 9/8/2023)       Objective     /72   Pulse 68   Temp 98.3 °F (36.8 °C) (Tympanic)   Ht 5' 4\" (1.626 m)   Wt 77.4 kg (170 lb 9.6 oz)   SpO2 95%   BMI 29.28 kg/m²     Physical Exam  Vitals and nursing note reviewed.   Constitutional:       General: She is not in acute distress.     Appearance: Normal appearance. She is well-developed. She is not diaphoretic.   HENT:      Head: Normocephalic and atraumatic.      Right Ear: External ear normal.      Left Ear: External ear normal.      Nose: Nose normal.      Mouth/Throat:      Pharynx: No oropharyngeal exudate.   Eyes:      General: No scleral icterus.        Right eye: No discharge.         Left eye: No discharge.      Conjunctiva/sclera: Conjunctivae normal.      Pupils: Pupils are equal, round, and reactive to light.   Neck:      Thyroid: No thyromegaly.   Cardiovascular:      Rate and Rhythm: Normal rate and regular rhythm.      Heart sounds: Normal heart sounds. No murmur heard.     No friction rub. No gallop.   Pulmonary:      Effort: Pulmonary effort is normal. No respiratory distress.      Breath sounds: Normal breath sounds. No wheezing or rales.   Abdominal:      General: Bowel sounds are normal. There is no distension.      Palpations: Abdomen is soft.      Tenderness: There is no abdominal tenderness.   Musculoskeletal:         General: No tenderness or deformity. Normal range of motion.      Cervical back: Normal range of motion and neck supple.      Left knee: Bony tenderness present.   Skin:     General: Skin is warm and dry.   Neurological:      Mental Status: She is alert and oriented to person, place, and time.      Cranial Nerves: No cranial nerve deficit.   Psychiatric:         Behavior: Behavior normal.         Thought Content: Thought content normal.         Judgment: Judgment normal.       Benita Santos PA-C    "

## 2024-03-25 DIAGNOSIS — E11.65 TYPE 2 DIABETES MELLITUS WITH HYPERGLYCEMIA, WITHOUT LONG-TERM CURRENT USE OF INSULIN (HCC): ICD-10-CM

## 2024-03-26 ENCOUNTER — HOSPITAL ENCOUNTER (OUTPATIENT)
Dept: MRI IMAGING | Facility: HOSPITAL | Age: 69
Discharge: HOME | End: 2024-03-26
Payer: MEDICARE

## 2024-03-26 DIAGNOSIS — M25.562 LATERAL KNEE PAIN, LEFT: ICD-10-CM

## 2024-03-26 PROCEDURE — 73721 MRI JNT OF LWR EXTRE W/O DYE: CPT

## 2024-04-01 NOTE — RESULT ENCOUNTER NOTE
Hudson Barry  Your MRI revealed a tear of the lateral meniscus and possibly the medical meniscus, you also have significant degenerative changes. I would recommend follow up with an orthopedist for further management

## 2024-04-05 DIAGNOSIS — I10 ESSENTIAL HYPERTENSION: ICD-10-CM

## 2024-04-05 RX ORDER — LOSARTAN POTASSIUM 100 MG/1
100 TABLET ORAL DAILY
Qty: 90 TABLET | Refills: 1 | Status: SHIPPED | OUTPATIENT
Start: 2024-04-05

## 2024-06-02 DIAGNOSIS — E03.9 ACQUIRED HYPOTHYROIDISM: ICD-10-CM

## 2024-06-02 DIAGNOSIS — E78.5 HYPERLIPIDEMIA, UNSPECIFIED HYPERLIPIDEMIA TYPE: ICD-10-CM

## 2024-06-02 RX ORDER — PRAVASTATIN SODIUM 20 MG
TABLET ORAL
Qty: 90 TABLET | Refills: 1 | Status: SHIPPED | OUTPATIENT
Start: 2024-06-02

## 2024-06-02 RX ORDER — LEVOTHYROXINE SODIUM 88 UG/1
TABLET ORAL
Qty: 90 TABLET | Refills: 1 | Status: SHIPPED | OUTPATIENT
Start: 2024-06-02

## 2024-09-10 ENCOUNTER — OFFICE VISIT (OUTPATIENT)
Dept: INTERNAL MEDICINE CLINIC | Facility: CLINIC | Age: 69
End: 2024-09-10
Payer: MEDICARE

## 2024-09-10 VITALS
TEMPERATURE: 97.1 F | SYSTOLIC BLOOD PRESSURE: 122 MMHG | DIASTOLIC BLOOD PRESSURE: 70 MMHG | BODY MASS INDEX: 27.96 KG/M2 | OXYGEN SATURATION: 96 % | HEIGHT: 64 IN | WEIGHT: 163.8 LBS | HEART RATE: 71 BPM

## 2024-09-10 DIAGNOSIS — Z23 ENCOUNTER FOR IMMUNIZATION: ICD-10-CM

## 2024-09-10 DIAGNOSIS — Z00.00 MEDICARE ANNUAL WELLNESS VISIT, SUBSEQUENT: Primary | ICD-10-CM

## 2024-09-10 DIAGNOSIS — E11.65 TYPE 2 DIABETES MELLITUS WITH HYPERGLYCEMIA, WITHOUT LONG-TERM CURRENT USE OF INSULIN (HCC): ICD-10-CM

## 2024-09-10 DIAGNOSIS — Z12.31 ENCOUNTER FOR SCREENING MAMMOGRAM FOR BREAST CANCER: ICD-10-CM

## 2024-09-10 DIAGNOSIS — E28.39 PRIMARY OVARIAN FAILURE: ICD-10-CM

## 2024-09-10 PROCEDURE — G0439 PPPS, SUBSEQ VISIT: HCPCS | Performed by: PHYSICIAN ASSISTANT

## 2024-09-10 RX ORDER — MAGNESIUM 30 MG
30 TABLET ORAL DAILY
COMMUNITY

## 2024-09-10 NOTE — PROGRESS NOTES
Diabetic Foot Exam    Patient's shoes and socks removed.    Right Foot/Ankle   Right Foot Inspection  Skin Exam: skin normal, skin intact, callus and callus. No dry skin, no warmth, no erythema, no maceration, no abnormal color, no pre-ulcer and no ulcer.     Toe Exam: ROM and strength within normal limits.     Sensory   Vibration: intact  Proprioception: intact  Monofilament testing: intact    Vascular  Capillary refills: < 3 seconds  The right DP pulse is 2+. The right PT pulse is 2+.     Left Foot/Ankle  Left Foot Inspection  Skin Exam: skin normal, skin intact and callus. No dry skin, no warmth, no erythema, no maceration, normal color, no pre-ulcer and no ulcer.     Toe Exam: ROM and strength within normal limits.     Sensory   Vibration: intact  Proprioception: intact  Monofilament testing: intact    Vascular  Capillary refills: < 3 seconds  The left DP pulse is 2+. The left PT pulse is 2+.     Assign Risk Category  No deformity present  No loss of protective sensation  No weak pulses  Risk: 0  Ambulatory Visit  Name: Asha Navarrete      : 1955      MRN: [id number]  Encounter Provider: Benita Santos PA-C  Encounter Date: 9/10/2024   Encounter department: [facility], located within [facility]    Assessment & Plan  Encounter for screening mammogram for breast cancer    Orders:    Mammo screening bilateral w 3d and cad; Future    Encounter for immunization    Orders:    Pneumococcal Conjugate Vaccine 20-valent (Pcv20)    Type 2 diabetes mellitus with hyperglycemia, without long-term current use of insulin (HCC)    Lab Results   Component Value Date    HGBA1C 5.8 2024       Orders:    Diabetic foot exam; Future    Primary ovarian failure    Orders:    DXA bone density spine hip and pelvis; Future    Medicare annual wellness visit, subsequent           Depression Screening and Follow-up Plan: Patient was screened for depression during today's encounter. They screened negative with a PHQ-2 score of  0.    Urinary Incontinence Plan of Care: counseling topics discussed: limit alcohol, caffeine, spicy foods, and acidic foods, limiting fluid intake 3-4 hours before bed and preventing constipation.       Preventive health issues were discussed with patient, and age appropriate screening tests were ordered as noted in patient's After Visit Summary. Personalized health advice and appropriate referrals for health education or preventive services given if needed, as noted in patient's After Visit Summary.    History of Present Illness     HPI   Patient Care Team:  Benita Santos PA-C as PCP - General (Internal Medicine)    Review of Systems   Constitutional:  Negative for chills and fever.   HENT:  Negative for congestion, ear pain, hearing loss, postnasal drip, rhinorrhea, sinus pressure, sinus pain, sore throat and trouble swallowing.    Eyes:  Negative for pain and visual disturbance.   Respiratory:  Negative for cough, chest tightness, shortness of breath and wheezing.    Cardiovascular: Negative.  Negative for chest pain, palpitations and leg swelling.   Gastrointestinal:  Negative for abdominal pain, blood in stool, constipation, diarrhea, nausea and vomiting.   Endocrine: Negative for cold intolerance, heat intolerance, polydipsia, polyphagia and polyuria.   Genitourinary:  Negative for difficulty urinating, dysuria, flank pain and urgency.   Musculoskeletal:  Positive for arthralgias (left ankle intermittent pain). Negative for back pain, gait problem and myalgias.   Skin:  Negative for rash.   Allergic/Immunologic: Negative.    Neurological:  Negative for dizziness, weakness, light-headedness and headaches.   Hematological: Negative.    Psychiatric/Behavioral:  Negative for behavioral problems, dysphoric mood and sleep disturbance. The patient is not nervous/anxious.      Medical History Reviewed by provider this encounter:       Annual Wellness Visit Agustin Barry is here for her Subsequent Wellness  visit. Last Medicare Wellness visit information reviewed, patient interviewed and updates made to the record today.      Health Risk Assessment:   Patient rates overall health as good. Patient feels that their physical health rating is slightly better. Patient is satisfied with their life. Eyesight was rated as same. Hearing was rated as same. Patient feels that their emotional and mental health rating is same. Patients states they are never, rarely angry. Patient states they are never, rarely unusually tired/fatigued. Pain experienced in the last 7 days has been some. Patient's pain rating has been 3/10. Patient states that she has experienced no weight loss or gain in last 6 months.     Depression Screening:   PHQ-2 Score: 0      Fall Risk Screening:   In the past year, patient has experienced: no history of falling in past year      Urinary Incontinence Screening:   Patient has leaked urine accidently in the last six months.     Home Safety:  Patient does not have trouble with stairs inside or outside of their home. Patient has working smoke alarms and has no working carbon monoxide detector. Home safety hazards include: none.     Nutrition:   Current diet is Regular.     Medications:   Patient is currently taking over-the-counter supplements. OTC medications include: see medication list. Patient is able to manage medications.     Activities of Daily Living (ADLs)/Instrumental Activities of Daily Living (IADLs):   Walk and transfer into and out of bed and chair?: Yes  Dress and groom yourself?: Yes    Bathe or shower yourself?: Yes    Feed yourself? Yes  Do your laundry/housekeeping?: Yes  Manage your money, pay your bills and track your expenses?: Yes  Make your own meals?: Yes    Do your own shopping?: Yes    Previous Hospitalizations:   Any hospitalizations or ED visits within the last 12 months?: No      Advance Care Planning:   Living will: No    Durable POA for healthcare: No    Advanced directive: No     ACP document given: Yes      Cognitive Screening:   Provider or family/friend/caregiver concerned regarding cognition?: No    PREVENTIVE SCREENINGS      Cardiovascular Screening:    General: Screening Not Indicated and History Lipid Disorder      Diabetes Screening:     General: Screening Not Indicated and History Diabetes      Colorectal Cancer Screening:     General: Screening Current      Breast Cancer Screening:     General: Screening Current      Cervical Cancer Screening:    General: Screening Not Indicated      Osteoporosis Screening:    General: Screening Not Indicated      Abdominal Aortic Aneurysm (AAA) Screening:        General: Screening Not Indicated      Lung Cancer Screening:     General: Screening Not Indicated      Hepatitis C Screening:    General: Screening Current    Screening, Brief Intervention, and Referral to Treatment (SBIRT)    Screening  Typical number of drinks in a day: 0  Typical number of drinks in a week: 1  Interpretation: Low risk drinking behavior.    AUDIT-C Screenin) How often did you have a drink containing alcohol in the past year? monthly or less  2) How many drinks did you have on a typical day when you were drinking in the past year? 1 to 2  3) How often did you have 6 or more drinks on one occasion in the past year? never    AUDIT-C Score: 1  Interpretation: Score 0-2 (female): Negative screen for alcohol misuse    Single Item Drug Screening:  How often have you used an illegal drug (including marijuana) or a prescription medication for non-medical reasons in the past year? never    Single Item Drug Screen Score: 0  Interpretation: Negative screen for possible drug use disorder    Social Determinants of Health     Financial Resource Strain: Low Risk  (2023)    Overall Financial Resource Strain (CARDIA)     Difficulty of Paying Living Expenses: Not very hard   Food Insecurity: No Food Insecurity (9/3/2024)    Hunger Vital Sign     Worried About Running Out of Food  "in the Last Year: Never true     Ran Out of Food in the Last Year: Never true   Transportation Needs: No Transportation Needs (9/3/2024)    PRAPARE - Transportation     Lack of Transportation (Medical): No     Lack of Transportation (Non-Medical): No   Housing Stability: Low Risk  (9/3/2024)    Housing Stability Vital Sign     Unable to Pay for Housing in the Last Year: No     Number of Times Moved in the Last Year: 0     Homeless in the Last Year: No   Utilities: Not At Risk (9/3/2024)    [facility] Utilities     Threatened with loss of utilities: No     No results found.    Objective     /70   Pulse 71   Temp (!) 97.1 °F (36.2 °C)   Ht 5' 4\" (1.626 m)   Wt 74.3 kg (163 lb 12.8 oz)   SpO2 96%   BMI 28.12 kg/m²     Physical Exam  Vitals and nursing note reviewed.   Constitutional:       Appearance: She is well-developed.   HENT:      Head: Normocephalic and atraumatic.      Right Ear: External ear normal.      Left Ear: External ear normal.      Nose: Nose normal.   Eyes:      Conjunctiva/sclera: Conjunctivae normal.   Cardiovascular:      Rate and Rhythm: Normal rate and regular rhythm.      Pulses: no weak pulses.           Dorsalis pedis pulses are 2+ on the right side and 2+ on the left side.        Posterior tibial pulses are 2+ on the right side and 2+ on the left side.      Heart sounds: Normal heart sounds.   Pulmonary:      Effort: Pulmonary effort is normal.      Breath sounds: Normal breath sounds.   Abdominal:      General: Bowel sounds are normal.      Palpations: Abdomen is soft.   Musculoskeletal:         General: Normal range of motion.      Cervical back: Normal range of motion and neck supple.   Feet:      Right foot:      Skin integrity: Callus present. No ulcer, skin breakdown, erythema, warmth or dry skin.      Left foot:      Skin integrity: Callus present. No ulcer, skin breakdown, erythema, warmth or dry skin.   Skin:     General: Skin is warm and dry.   Neurological:      Mental Status: " She is alert and oriented to person, place, and time.   Psychiatric:         Behavior: Behavior normal.         Thought Content: Thought content normal.         Judgment: Judgment normal.       Administrative Statements   I have spent a total time of 15 minutes in caring for this patient on the day of the visit/encounter including Patient and family education, Importance of tx compliance, Risk factor reductions, Impressions, Counseling / Coordination of care, Documenting in the medical record, and Reviewing / ordering tests, medicine, procedures  .

## 2024-09-10 NOTE — PATIENT INSTRUCTIONS
Medicare Preventive Visit Patient Instructions  Thank you for completing your Welcome to Medicare Visit or Medicare Annual Wellness Visit today. Your next wellness visit will be due in one year (9/11/2025).  The screening/preventive services that you may require over the next 5-10 years are detailed below. Some tests may not apply to you based off risk factors and/or age. Screening tests ordered at today's visit but not completed yet may show as past due. Also, please note that scanned in results may not display below.  Preventive Screenings:  Service Recommendations Previous Testing/Comments   Colorectal Cancer Screening  * Colonoscopy    * Fecal Occult Blood Test (FOBT)/Fecal Immunochemical Test (FIT)  * Fecal DNA/Cologuard Test  * Flexible Sigmoidoscopy Age: 45-75 years old   Colonoscopy: every 10 years (may be performed more frequently if at higher risk)  OR  FOBT/FIT: every 1 year  OR  Cologuard: every 3 years  OR  Sigmoidoscopy: every 5 years  Screening may be recommended earlier than age 45 if at higher risk for colorectal cancer. Also, an individualized decision between you and your healthcare provider will decide whether screening between the ages of 76-85 would be appropriate. Colonoscopy: Not on file  FOBT/FIT: Not on file  Cologuard: 04/25/2022  Sigmoidoscopy: Not on file    Screening Current     Breast Cancer Screening Age: 40+ years old  Frequency: every 1-2 years  Not required if history of left and right mastectomy Mammogram: 07/31/2023    Screening Current   Cervical Cancer Screening Between the ages of 21-29, pap smear recommended once every 3 years.   Between the ages of 30-65, can perform pap smear with HPV co-testing every 5 years.   Recommendations may differ for women with a history of total hysterectomy, cervical cancer, or abnormal pap smears in past. Pap Smear: Not on file    Screening Not Indicated   Hepatitis C Screening Once for adults born between 1945 and 1965  More frequently in  patients at high risk for Hepatitis C Hep C Antibody: 09/08/2023    Screening Current   Diabetes Screening 1-2 times per year if you're at risk for diabetes or have pre-diabetes Fasting glucose: 118 mg/dL (9/8/2023)  A1C: 5.8 (3/14/2024)  Screening Not Indicated  History Diabetes   Cholesterol Screening Once every 5 years if you don't have a lipid disorder. May order more often based on risk factors. Lipid panel: 09/08/2023    Screening Not Indicated  History Lipid Disorder     Other Preventive Screenings Covered by Medicare:  Abdominal Aortic Aneurysm (AAA) Screening: covered once if your at risk. You're considered to be at risk if you have a family history of AAA.  Lung Cancer Screening: covers low dose CT scan once per year if you meet all of the following conditions: (1) Age 55-77; (2) No signs or symptoms of lung cancer; (3) Current smoker or have quit smoking within the last 15 years; (4) You have a tobacco smoking history of at least 20 pack years (packs per day multiplied by number of years you smoked); (5) You get a written order from a healthcare provider.  Glaucoma Screening: covered annually if you're considered high risk: (1) You have diabetes OR (2) Family history of glaucoma OR (3)  aged 50 and older OR (4)  American aged 65 and older  Osteoporosis Screening: covered every 2 years if you meet one of the following conditions: (1) You're estrogen deficient and at risk for osteoporosis based off medical history and other findings; (2) Have a vertebral abnormality; (3) On glucocorticoid therapy for more than 3 months; (4) Have primary hyperparathyroidism; (5) On osteoporosis medications and need to assess response to drug therapy.   Last bone density test (DXA Scan): Not on file.  HIV Screening: covered annually if you're between the age of 15-65. Also covered annually if you are younger than 15 and older than 65 with risk factors for HIV infection. For pregnant patients, it is  covered up to 3 times per pregnancy.    Immunizations:  Immunization Recommendations   Influenza Vaccine Annual influenza vaccination during flu season is recommended for all persons aged >= 6 months who do not have contraindications   Pneumococcal Vaccine   * Pneumococcal conjugate vaccine = PCV13 (Prevnar 13), PCV15 (Vaxneuvance), PCV20 (Prevnar 20)  * Pneumococcal polysaccharide vaccine = PPSV23 (Pneumovax) Adults 19-65 yo with certain risk factors or if 65+ yo  If never received any pneumonia vaccine: recommend Prevnar 20 (PCV20)  Give PCV20 if previously received 1 dose of PCV13 or PPSV23   Hepatitis B Vaccine 3 dose series if at intermediate or high risk (ex: diabetes, end stage renal disease, liver disease)   Respiratory syncytial virus (RSV) Vaccine - COVERED BY MEDICARE PART D  * RSVPreF3 (Arexvy) CDC recommends that adults 60 years of age and older may receive a single dose of RSV vaccine using shared clinical decision-making (SCDM)   Tetanus (Td) Vaccine - COST NOT COVERED BY MEDICARE PART B Following completion of primary series, a booster dose should be given every 10 years to maintain immunity against tetanus. Td may also be given as tetanus wound prophylaxis.   Tdap Vaccine - COST NOT COVERED BY MEDICARE PART B Recommended at least once for all adults. For pregnant patients, recommended with each pregnancy.   Shingles Vaccine (Shingrix) - COST NOT COVERED BY MEDICARE PART B  2 shot series recommended in those 19 years and older who have or will have weakened immune systems or those 50 years and older     Health Maintenance Due:      Topic Date Due   • Breast Cancer Screening: Mammogram  07/31/2024   • Colorectal Cancer Screening  04/25/2025   • Hepatitis C Screening  Completed     Immunizations Due:      Topic Date Due   • Pneumococcal Vaccine: 65+ Years (1 of 2 - PCV) Never done   • Influenza Vaccine (1) 09/01/2024     Advance Directives   What are advance directives?  Advance directives are legal  documents that state your wishes and plans for medical care. These plans are made ahead of time in case you lose your ability to make decisions for yourself. Advance directives can apply to any medical decision, such as the treatments you want, and if you want to donate organs.   What are the types of advance directives?  There are many types of advance directives, and each state has rules about how to use them. You may choose a combination of any of the following:  Living will:  This is a written record of the treatment you want. You can also choose which treatments you do not want, which to limit, and which to stop at a certain time. This includes surgery, medicine, IV fluid, and tube feedings.   Durable power of  for healthcare (DPAHC):  This is a written record that states who you want to make healthcare choices for you when you are unable to make them for yourself. This person, called a proxy, is usually a family member or a friend. You may choose more than 1 proxy.  Do not resuscitate (DNR) order:  A DNR order is used in case your heart stops beating or you stop breathing. It is a request not to have certain forms of treatment, such as CPR. A DNR order may be included in other types of advance directives.  Medical directive:  This covers the care that you want if you are in a coma, near death, or unable to make decisions for yourself. You can list the treatments you want for each condition. Treatment may include pain medicine, surgery, blood transfusions, dialysis, IV or tube feedings, and a ventilator (breathing machine).  Values history:  This document has questions about your views, beliefs, and how you feel and think about life. This information can help others choose the care that you would choose.  Why are advance directives important?  An advance directive helps you control your care. Although spoken wishes may be used, it is better to have your wishes written down. Spoken wishes can be  misunderstood, or not followed. Treatments may be given even if you do not want them. An advance directive may make it easier for your family to make difficult choices about your care.   Urinary Incontinence   Urinary incontinence (UI)  is when you lose control of your bladder. UI develops because your bladder cannot store or empty urine properly. The 3 most common types of UI are stress incontinence, urge incontinence, or both.  Medicines:   May be given to help strengthen your bladder control. Report any side effects of medication to your healthcare provider.  Do pelvic muscle exercises often:  Your pelvic muscles help you stop urinating. Squeeze these muscles tight for 5 seconds, then relax for 5 seconds. Gradually work up to squeezing for 10 seconds. Do 3 sets of 15 repetitions a day, or as directed. This will help strengthen your pelvic muscles and improve bladder control.  Train your bladder:  Go to the bathroom at set times, such as every 2 hours, even if you do not feel the urge to go. You can also try to hold your urine when you feel the urge to go. For example, hold your urine for 5 minutes when you feel the urge to go. As that becomes easier, hold your urine for 10 minutes.   Self-care:   Keep a UI record.  Write down how often you leak urine and how much you leak. Make a note of what you were doing when you leaked urine.  Drink liquids as directed. You may need to limit the amount of liquid you drink to help control your urine leakage. Do not drink any liquid right before you go to bed. Limit or do not have drinks that contain caffeine or alcohol.   Prevent constipation.  Eat a variety of high-fiber foods. Good examples are high-fiber cereals, beans, vegetables, and whole-grain breads. Walking is the best way to trigger your intestines to have a bowel movement.  Exercise regularly and maintain a healthy weight.  Weight loss and exercise will decrease pressure on your bladder and help you control your  leakage.   Use a catheter as directed  to help empty your bladder. A catheter is a tiny, plastic tube that is put into your bladder to drain your urine.   Go to behavior therapy as directed.  Behavior therapy may be used to help you learn to control your urge to urinate.    Weight Management   Why it is important to manage your weight:  Being overweight increases your risk of health conditions such as heart disease, high blood pressure, type 2 diabetes, and certain types of cancer. It can also increase your risk for osteoarthritis, sleep apnea, and other respiratory problems. Aim for a slow, steady weight loss. Even a small amount of weight loss can lower your risk of health problems.  How to lose weight safely:  A safe and healthy way to lose weight is to eat fewer calories and get regular exercise. You can lose up about 1 pound a week by decreasing the number of calories you eat by 500 calories each day.   Healthy meal plan for weight management:  A healthy meal plan includes a variety of foods, contains fewer calories, and helps you stay healthy. A healthy meal plan includes the following:  Eat whole-grain foods more often.  A healthy meal plan should contain fiber. Fiber is the part of grains, fruits, and vegetables that is not broken down by your body. Whole-grain foods are healthy and provide extra fiber in your diet. Some examples of whole-grain foods are whole-wheat breads and pastas, oatmeal, brown rice, and bulgur.  Eat a variety of vegetables every day.  Include dark, leafy greens such as spinach, kale, live greens, and mustard greens. Eat yellow and orange vegetables such as carrots, sweet potatoes, and winter squash.   Eat a variety of fruits every day.  Choose fresh or canned fruit (canned in its own juice or light syrup) instead of juice. Fruit juice has very little or no fiber.  Eat low-fat dairy foods.  Drink fat-free (skim) milk or 1% milk. Eat fat-free yogurt and low-fat cottage cheese. Try  low-fat cheeses such as mozzarella and other reduced-fat cheeses.  Choose meat and other protein foods that are low in fat.  Choose beans or other legumes such as split peas or lentils. Choose fish, skinless poultry (chicken or turkey), or lean cuts of red meat (beef or pork). Before you cook meat or poultry, cut off any visible fat.   Use less fat and oil.  Try baking foods instead of frying them. Add less fat, such as margarine, sour cream, regular salad dressing and mayonnaise to foods. Eat fewer high-fat foods. Some examples of high-fat foods include french fries, doughnuts, ice cream, and cakes.  Eat fewer sweets.  Limit foods and drinks that are high in sugar. This includes candy, cookies, regular soda, and sweetened drinks.  Exercise:  Exercise at least 30 minutes per day on most days of the week. Some examples of exercise include walking, biking, dancing, and swimming. You can also fit in more physical activity by taking the stairs instead of the elevator or parking farther away from stores. Ask your healthcare provider about the best exercise plan for you.      © Copyright [employer] 2018 Information is for End User's use only and may not be sold, redistributed or otherwise used for commercial purposes. All illustrations and images included in CareNotes® are the copyrighted property of [employer] or [employer]

## 2024-09-10 NOTE — ASSESSMENT & PLAN NOTE
Lab Results   Component Value Date    HGBA1C 5.8 03/14/2024       Orders:    Diabetic foot exam; Future

## 2024-09-22 DIAGNOSIS — E11.65 TYPE 2 DIABETES MELLITUS WITH HYPERGLYCEMIA, WITHOUT LONG-TERM CURRENT USE OF INSULIN (HCC): ICD-10-CM

## 2024-10-02 DIAGNOSIS — E11.65 TYPE 2 DIABETES MELLITUS WITH HYPERGLYCEMIA, WITHOUT LONG-TERM CURRENT USE OF INSULIN (HCC): ICD-10-CM

## 2024-10-02 DIAGNOSIS — I10 ESSENTIAL HYPERTENSION: ICD-10-CM

## 2024-10-02 RX ORDER — LOSARTAN POTASSIUM 100 MG/1
100 TABLET ORAL DAILY
Qty: 90 TABLET | Refills: 0 | Status: SHIPPED | OUTPATIENT
Start: 2024-10-02

## 2024-10-05 ENCOUNTER — APPOINTMENT (EMERGENCY)
Dept: CT IMAGING | Facility: HOSPITAL | Age: 69
End: 2024-10-05
Payer: MEDICARE

## 2024-10-05 ENCOUNTER — APPOINTMENT (EMERGENCY)
Dept: ULTRASOUND IMAGING | Facility: HOSPITAL | Age: 69
End: 2024-10-05
Payer: MEDICARE

## 2024-10-05 ENCOUNTER — OFFICE VISIT (OUTPATIENT)
Dept: URGENT CARE | Facility: CLINIC | Age: 69
End: 2024-10-05
Payer: MEDICARE

## 2024-10-05 ENCOUNTER — HOSPITAL ENCOUNTER (OUTPATIENT)
Facility: HOSPITAL | Age: 69
Setting detail: OBSERVATION
Discharge: HOME | End: 2024-10-07
Attending: EMERGENCY MEDICINE | Admitting: SURGERY
Payer: MEDICARE

## 2024-10-05 VITALS
SYSTOLIC BLOOD PRESSURE: 140 MMHG | RESPIRATION RATE: 18 BRPM | DIASTOLIC BLOOD PRESSURE: 80 MMHG | HEIGHT: 64 IN | BODY MASS INDEX: 27.83 KG/M2 | TEMPERATURE: 98.1 F | OXYGEN SATURATION: 97 % | HEART RATE: 61 BPM | WEIGHT: 163 LBS

## 2024-10-05 DIAGNOSIS — K80.12 CALCULUS OF GALLBLADDER WITH ACUTE ON CHRONIC CHOLECYSTITIS WITHOUT OBSTRUCTION: ICD-10-CM

## 2024-10-05 DIAGNOSIS — K80.10 CALCULUS OF GALLBLADDER WITH CHRONIC CHOLECYSTITIS WITHOUT OBSTRUCTION: ICD-10-CM

## 2024-10-05 DIAGNOSIS — K80.50 BILIARY COLIC: Primary | ICD-10-CM

## 2024-10-05 DIAGNOSIS — R10.9 FLANK PAIN, ACUTE: Primary | ICD-10-CM

## 2024-10-05 LAB
ALBUMIN SERPL BCG-MCNC: 4.6 G/DL (ref 3.5–5)
ALP SERPL-CCNC: 51 U/L (ref 34–104)
ALT SERPL W P-5'-P-CCNC: 13 U/L (ref 7–52)
ANION GAP SERPL CALCULATED.3IONS-SCNC: 9 MMOL/L (ref 4–13)
AST SERPL W P-5'-P-CCNC: 14 U/L (ref 13–39)
BASOPHILS # BLD AUTO: 0.01 THOUSANDS/ΜL (ref 0–0.1)
BASOPHILS NFR BLD AUTO: 0 % (ref 0–1)
BILIRUB SERPL-MCNC: 0.63 MG/DL (ref 0.2–1)
BILIRUB UR QL STRIP: NEGATIVE
BUN SERPL-MCNC: 14 MG/DL (ref 5–25)
CALCIUM SERPL-MCNC: 9.6 MG/DL (ref 8.4–10.2)
CHLORIDE SERPL-SCNC: 101 MMOL/L (ref 96–108)
CLARITY UR: CLEAR
CO2 SERPL-SCNC: 27 MMOL/L (ref 21–32)
COLOR UR: YELLOW
CREAT SERPL-MCNC: 0.68 MG/DL (ref 0.6–1.3)
EOSINOPHIL # BLD AUTO: 0.02 THOUSAND/ΜL (ref 0–0.61)
EOSINOPHIL NFR BLD AUTO: 0 % (ref 0–6)
ERYTHROCYTE [DISTWIDTH] IN BLOOD BY AUTOMATED COUNT: 12.2 % (ref 11.6–15.1)
GFR SERPL CREATININE-BSD FRML MDRD: 88 ML/MIN/1.73SQ M
GLUCOSE SERPL-MCNC: 123 MG/DL (ref 65–140)
GLUCOSE SERPL-MCNC: 138 MG/DL (ref 65–140)
GLUCOSE UR STRIP-MCNC: NEGATIVE MG/DL
HCT VFR BLD AUTO: 43.4 % (ref 34.8–46.1)
HGB BLD-MCNC: 13.9 G/DL (ref 11.5–15.4)
HGB UR QL STRIP.AUTO: NEGATIVE
IMM GRANULOCYTES # BLD AUTO: 0.03 THOUSAND/UL (ref 0–0.2)
IMM GRANULOCYTES NFR BLD AUTO: 0 % (ref 0–2)
KETONES UR STRIP-MCNC: ABNORMAL MG/DL
LEUKOCYTE ESTERASE UR QL STRIP: NEGATIVE
LIPASE SERPL-CCNC: 12 U/L (ref 11–82)
LYMPHOCYTES # BLD AUTO: 1.11 THOUSANDS/ΜL (ref 0.6–4.47)
LYMPHOCYTES NFR BLD AUTO: 16 % (ref 14–44)
MCH RBC QN AUTO: 31.1 PG (ref 26.8–34.3)
MCHC RBC AUTO-ENTMCNC: 32 G/DL (ref 31.4–37.4)
MCV RBC AUTO: 97 FL (ref 82–98)
MONOCYTES # BLD AUTO: 0.54 THOUSAND/ΜL (ref 0.17–1.22)
MONOCYTES NFR BLD AUTO: 8 % (ref 4–12)
NEUTROPHILS # BLD AUTO: 5.28 THOUSANDS/ΜL (ref 1.85–7.62)
NEUTS SEG NFR BLD AUTO: 76 % (ref 43–75)
NITRITE UR QL STRIP: NEGATIVE
NRBC BLD AUTO-RTO: 0 /100 WBCS
PH UR STRIP.AUTO: 8 [PH]
PLATELET # BLD AUTO: 231 THOUSANDS/UL (ref 149–390)
PMV BLD AUTO: 9.3 FL (ref 8.9–12.7)
POTASSIUM SERPL-SCNC: 4 MMOL/L (ref 3.5–5.3)
PROT SERPL-MCNC: 6.8 G/DL (ref 6.4–8.4)
PROT UR STRIP-MCNC: NEGATIVE MG/DL
RBC # BLD AUTO: 4.47 MILLION/UL (ref 3.81–5.12)
SL AMB  POCT GLUCOSE, UA: ABNORMAL
SL AMB LEUKOCYTE ESTERASE,UA: ABNORMAL
SL AMB POCT BILIRUBIN,UA: ABNORMAL
SL AMB POCT BLOOD,UA: ABNORMAL
SL AMB POCT CLARITY,UA: ABNORMAL
SL AMB POCT COLOR,UA: ABNORMAL
SL AMB POCT KETONES,UA: ABNORMAL
SL AMB POCT NITRITE,UA: ABNORMAL
SL AMB POCT PH,UA: 6
SL AMB POCT SPECIFIC GRAVITY,UA: 1.02
SL AMB POCT URINE PROTEIN: ABNORMAL
SL AMB POCT UROBILINOGEN: ABNORMAL
SODIUM SERPL-SCNC: 137 MMOL/L (ref 135–147)
SP GR UR STRIP.AUTO: 1.02
UROBILINOGEN UR QL STRIP.AUTO: 0.2 E.U./DL
WBC # BLD AUTO: 6.99 THOUSAND/UL (ref 4.31–10.16)

## 2024-10-05 PROCEDURE — 96374 THER/PROPH/DIAG INJ IV PUSH: CPT

## 2024-10-05 PROCEDURE — 99213 OFFICE O/P EST LOW 20 MIN: CPT | Performed by: PHYSICIAN ASSISTANT

## 2024-10-05 PROCEDURE — 82948 REAGENT STRIP/BLOOD GLUCOSE: CPT

## 2024-10-05 PROCEDURE — 76705 ECHO EXAM OF ABDOMEN: CPT

## 2024-10-05 PROCEDURE — 81002 URINALYSIS NONAUTO W/O SCOPE: CPT | Performed by: PHYSICIAN ASSISTANT

## 2024-10-05 PROCEDURE — 85025 COMPLETE CBC W/AUTO DIFF WBC: CPT

## 2024-10-05 PROCEDURE — 83690 ASSAY OF LIPASE: CPT

## 2024-10-05 PROCEDURE — 96375 TX/PRO/DX INJ NEW DRUG ADDON: CPT

## 2024-10-05 PROCEDURE — 99285 EMERGENCY DEPT VISIT HI MDM: CPT

## 2024-10-05 PROCEDURE — G0463 HOSPITAL OUTPT CLINIC VISIT: HCPCS | Performed by: PHYSICIAN ASSISTANT

## 2024-10-05 PROCEDURE — 99284 EMERGENCY DEPT VISIT MOD MDM: CPT

## 2024-10-05 PROCEDURE — 74177 CT ABD & PELVIS W/CONTRAST: CPT

## 2024-10-05 PROCEDURE — 36415 COLL VENOUS BLD VENIPUNCTURE: CPT

## 2024-10-05 PROCEDURE — 80053 COMPREHEN METABOLIC PANEL: CPT

## 2024-10-05 PROCEDURE — 81003 URINALYSIS AUTO W/O SCOPE: CPT | Performed by: EMERGENCY MEDICINE

## 2024-10-05 RX ORDER — ONDANSETRON 2 MG/ML
4 INJECTION INTRAMUSCULAR; INTRAVENOUS ONCE
Status: COMPLETED | OUTPATIENT
Start: 2024-10-05 | End: 2024-10-05

## 2024-10-05 RX ORDER — HEPARIN SODIUM 5000 [USP'U]/ML
5000 INJECTION, SOLUTION INTRAVENOUS; SUBCUTANEOUS EVERY 8 HOURS SCHEDULED
Status: DISCONTINUED | OUTPATIENT
Start: 2024-10-05 | End: 2024-10-07 | Stop reason: HOSPADM

## 2024-10-05 RX ORDER — ONDANSETRON 2 MG/ML
4 INJECTION INTRAMUSCULAR; INTRAVENOUS EVERY 6 HOURS PRN
Status: DISCONTINUED | OUTPATIENT
Start: 2024-10-05 | End: 2024-10-07 | Stop reason: HOSPADM

## 2024-10-05 RX ORDER — LEVOTHYROXINE SODIUM 88 UG/1
88 TABLET ORAL DAILY
Status: DISCONTINUED | OUTPATIENT
Start: 2024-10-06 | End: 2024-10-07 | Stop reason: HOSPADM

## 2024-10-05 RX ORDER — LOSARTAN POTASSIUM 50 MG/1
100 TABLET ORAL DAILY
Status: DISCONTINUED | OUTPATIENT
Start: 2024-10-06 | End: 2024-10-07 | Stop reason: HOSPADM

## 2024-10-05 RX ORDER — KETOROLAC TROMETHAMINE 30 MG/ML
15 INJECTION, SOLUTION INTRAMUSCULAR; INTRAVENOUS ONCE
Status: COMPLETED | OUTPATIENT
Start: 2024-10-05 | End: 2024-10-05

## 2024-10-05 RX ORDER — SODIUM CHLORIDE 9 MG/ML
100 INJECTION, SOLUTION INTRAVENOUS CONTINUOUS
Status: DISCONTINUED | OUTPATIENT
Start: 2024-10-05 | End: 2024-10-06

## 2024-10-05 RX ORDER — HYDROMORPHONE HCL/PF 1 MG/ML
0.5 SYRINGE (ML) INJECTION ONCE
Status: COMPLETED | OUTPATIENT
Start: 2024-10-05 | End: 2024-10-05

## 2024-10-05 RX ORDER — ACETAMINOPHEN 10 MG/ML
1000 INJECTION, SOLUTION INTRAVENOUS EVERY 6 HOURS PRN
Status: DISCONTINUED | OUTPATIENT
Start: 2024-10-05 | End: 2024-10-07 | Stop reason: HOSPADM

## 2024-10-05 RX ORDER — HYDROMORPHONE HCL/PF 1 MG/ML
0.5 SYRINGE (ML) INJECTION EVERY 4 HOURS PRN
Status: DISCONTINUED | OUTPATIENT
Start: 2024-10-05 | End: 2024-10-06

## 2024-10-05 RX ADMIN — KETOROLAC TROMETHAMINE 15 MG: 30 INJECTION, SOLUTION INTRAMUSCULAR at 17:07

## 2024-10-05 RX ADMIN — HEPARIN SODIUM 5000 UNITS: 5000 INJECTION, SOLUTION INTRAVENOUS; SUBCUTANEOUS at 22:44

## 2024-10-05 RX ADMIN — ACETAMINOPHEN 1000 MG: 10 INJECTION INTRAVENOUS at 22:40

## 2024-10-05 RX ADMIN — ONDANSETRON 4 MG: 2 INJECTION INTRAMUSCULAR; INTRAVENOUS at 17:13

## 2024-10-05 RX ADMIN — IOHEXOL 100 ML: 350 INJECTION, SOLUTION INTRAVENOUS at 17:43

## 2024-10-05 RX ADMIN — HYDROMORPHONE HYDROCHLORIDE 0.5 MG: 1 INJECTION, SOLUTION INTRAMUSCULAR; INTRAVENOUS; SUBCUTANEOUS at 19:47

## 2024-10-05 RX ADMIN — SODIUM CHLORIDE 100 ML/HR: 0.9 INJECTION, SOLUTION INTRAVENOUS at 21:50

## 2024-10-05 NOTE — ED PROVIDER NOTES
Final diagnoses:   Biliary colic     ED Disposition       ED Disposition   Admit    Condition   Stable    Date/Time   Sat Oct 5, 2024  8:25 PM    Comment   Case was discussed with LENORE and the patient's admission status was agreed to be Admission Status: observation status to the service of Dr. Lee .               Assessment & Plan       Medical Decision Making  67 yo female presenting with right flank pain that started this morning. Progressively worsening throughout the day, now 8/10 pain. Associated nausea, no vomiting. Denies abd pain. On exam, appears uncomfortable secondary to pain. Heart and lungs CTA. Abdomen soft, non-tender. Right-sided CVA tenderness and tenderness along ribs up to RUQ.     DDx: urolithiasis, pyelonephritis, cholecystitis, MSK back pain  Plan: CBC to evaluate for any significant leukocytosis or anemia. CMP for electrolyte disturbance, renal function, or bilary dysfunction. Lipase to evaluate for pancreatitis. U/A for urinary tract infection. CT Abdomen/pelvis to evaluate for acute intra-abdominal infection or pathology such as pyelonephritis, urolithiasis, colitis, cholecystitis, pancreatitis, tumor/mass     Labs unremarkable. Before formal read, no CT findings of kidney stones appreciated so RUQ ultrasound ordered to evaluate for acute cholecystitis. Patient still with significant pain, concern for biliary colic. CT A/P read with possible gastritis but no other acute findings. Discussed incidental finding of lung nodule. Patient is aware and states this has been there forever. U/A negative for findings of cholecystitis. Case discussed with general surgery. Will admit patient to general surgery with observation status. Patient in agreement and understanding with this plan.     Amount and/or Complexity of Data Reviewed  Labs: ordered. Decision-making details documented in ED Course.  Radiology: ordered. Decision-making details documented in ED Course.    Risk  Prescription drug  management.        ED Course as of 10/05/24 2033   Sat Oct 05, 2024   1713 CBC and differential(!)   1741 LIPASE: 12   1741 Comprehensive metabolic panel   1803 Patient reassessed and reports pain improved after Toradol. Still a 5/10 but declining further pain medications in at this time.    1804 UA w Reflex to Microscopic w Reflex to Culture(!)   1804 Leukocytes, UA: Negative   1804 Nitrite, UA: Negative   1933 CT abdomen pelvis with contrast  The gallbladder is moderate distended, containing large faintly rim-calcified gallstones.  No CT evidence of acute cholecystitis.     Mild antral wall thickening, gastritis not excluded.     Pulmonary nodules measuring up to 3 mm.  Considering smoking history, follow-up chest CT can be considered in 12 months.   2024 US right upper quadrant  IMPRESSION:  Cholelithiasis without additional sonographic findings of acute cholecystitis.  Hepatomegaly.       Medications   sodium chloride 0.9 % infusion (has no administration in time range)   ondansetron (ZOFRAN) injection 4 mg (has no administration in time range)   heparin (porcine) subcutaneous injection 5,000 Units (has no administration in time range)   HYDROmorphone (DILAUDID) injection 0.5 mg (has no administration in time range)   acetaminophen (Ofirmev) injection 1,000 mg (has no administration in time range)   levothyroxine tablet 88 mcg (has no administration in time range)   losartan (COZAAR) tablet 100 mg (has no administration in time range)   metFORMIN (GLUCOPHAGE) tablet 500 mg (has no administration in time range)   ketorolac (TORADOL) injection 15 mg (15 mg Intravenous Given 10/5/24 1707)   ondansetron (ZOFRAN) injection 4 mg (4 mg Intravenous Given 10/5/24 1713)   iohexol (OMNIPAQUE) 350 MG/ML injection (MULTI-DOSE) 100 mL (100 mL Intravenous Given 10/5/24 1743)   HYDROmorphone (DILAUDID) injection 0.5 mg (0.5 mg Intravenous Given 10/5/24 1947)       ED Risk Strat Scores                                                History of Present Illness       Chief Complaint   Patient presents with    Flank Pain     Patient reports right sided flank pains starting at 0930. Denies urinary frequency or pain.        Past Medical History:   Diagnosis Date    Allergic     Arthritis     Disease of thyroid gland     Hypertension     Obesity       Past Surgical History:   Procedure Laterality Date    ENDOSCOPIC RELEASE TRANSVERSE CARPAL LIGAMENT OF HAND Right     TUBAL LIGATION        Family History   Problem Relation Age of Onset    Thyroid disease Mother     Anxiety disorder Mother     Cancer Father     No Known Problems Daughter     No Known Problems Maternal Grandmother     No Known Problems Paternal Grandmother     Breast cancer Paternal Aunt         in her 30's    No Known Problems Paternal Aunt     No Known Problems Paternal Aunt     No Known Problems Paternal Aunt       Social History     Tobacco Use    Smoking status: Former     Current packs/day: 0.25     Average packs/day: 0.3 packs/day for 15.0 years (3.8 ttl pk-yrs)     Types: Cigarettes     Passive exposure: Past    Smokeless tobacco: Never   Vaping Use    Vaping status: Never Used   Substance Use Topics    Alcohol use: Yes     Comment: Maybe one or 2 beer or wine per mo th, social only    Drug use: Never      E-Cigarette/Vaping    E-Cigarette Use Never User       E-Cigarette/Vaping Substances    Nicotine No     THC No     CBD No     Flavoring No     Other No     Unknown No       I have reviewed and agree with the history as documented.     Asha is a a 69 yo female with a PMHx of HTN, HLD, DM, and hypothyroidism presenting with right-sided back pain that was sudden onset this morning. It has been progressively worsening throughout the day. She is experiencing 8/10 pain. The pain is on the right side of her back and radiates around to her RUQ. She says a few weeks go she experienced the same thing and was attributing it to soreness from her yoga, but it went away on its home.  This time it is stronger and has not resolved. She has nausea without vomiting. She reports a history of tubal ligation.       History provided by:  Patient   used: Yes    Flank Pain  Associated symptoms: nausea    Associated symptoms: no chest pain, no chills, no constipation, no diarrhea, no dysuria, no fatigue, no fever, no shortness of breath and no vomiting        Review of Systems   Constitutional:  Negative for appetite change, chills, fatigue and fever.   HENT:  Negative for congestion.    Respiratory:  Negative for shortness of breath.    Cardiovascular:  Negative for chest pain.   Gastrointestinal:  Positive for nausea. Negative for abdominal pain, constipation, diarrhea and vomiting.   Genitourinary:  Positive for flank pain. Negative for dysuria and urgency.   Musculoskeletal:  Negative for arthralgias.   Skin:  Negative for color change and pallor.   Neurological:  Negative for light-headedness and headaches.           Objective       ED Triage Vitals   Temperature Pulse Blood Pressure Respirations SpO2 Patient Position - Orthostatic VS   10/05/24 1648 10/05/24 1648 10/05/24 1648 10/05/24 1648 10/05/24 1648 --   (!) 97 °F (36.1 °C) 65 (!) 140/105 16 99 %       Temp Source Heart Rate Source BP Location FiO2 (%) Pain Score    10/05/24 1648 10/05/24 1950 -- -- 10/05/24 1648    Temporal Monitor   7      Vitals      Date and Time Temp Pulse SpO2 Resp BP Pain Score FACES Pain Rating User   10/05/24 1950 -- 63 97 % 17 162/70 -- -- AF   10/05/24 1947 -- -- -- -- -- 6 -- AF   10/05/24 1648 97 °F (36.1 °C) 65 99 % 16 140/105 7 -- AM            Physical Exam  Vitals and nursing note reviewed.   Constitutional:       General: She is not in acute distress.     Appearance: Normal appearance.   HENT:      Head: Normocephalic and atraumatic.      Right Ear: External ear normal.      Left Ear: External ear normal.      Nose: Nose normal.      Mouth/Throat:      Mouth: Mucous membranes are moist.       Pharynx: Oropharynx is clear.   Eyes:      Extraocular Movements: Extraocular movements intact.      Conjunctiva/sclera: Conjunctivae normal.      Pupils: Pupils are equal, round, and reactive to light.   Cardiovascular:      Rate and Rhythm: Normal rate and regular rhythm.      Pulses: Normal pulses.      Heart sounds: Normal heart sounds.   Pulmonary:      Effort: Pulmonary effort is normal.      Breath sounds: No wheezing, rhonchi or rales.   Abdominal:      General: Abdomen is flat.      Palpations: Abdomen is soft.      Tenderness: There is no abdominal tenderness. There is right CVA tenderness. There is no left CVA tenderness, guarding or rebound. Negative signs include Rangel's sign and McBurney's sign.   Musculoskeletal:         General: Normal range of motion.      Cervical back: Normal range of motion.   Skin:     General: Skin is warm and dry.      Capillary Refill: Capillary refill takes less than 2 seconds.   Neurological:      General: No focal deficit present.      Mental Status: She is alert. Mental status is at baseline.   Psychiatric:         Mood and Affect: Mood normal.         Behavior: Behavior normal.         Results Reviewed       Procedure Component Value Units Date/Time    Platelet count [[id number]]     Lab Status: No result Specimen: Blood     UA w Reflex to Microscopic w Reflex to Culture [[id number]]  (Abnormal) Collected: 10/05/24 1747    Lab Status: Final result Specimen: Urine, Clean Catch Updated: 10/05/24 1754     Color, UA Yellow     Clarity, UA Clear     Specific Gravity, UA 1.020     pH, UA 8.0     Leukocytes, UA Negative     Nitrite, UA Negative     Protein, UA Negative mg/dl      Glucose, UA Negative mg/dl      Ketones, UA 40 (2+) mg/dl      Urobilinogen, UA 0.2 E.U./dl      Bilirubin, UA Negative     Occult Blood, UA Negative    Comprehensive metabolic panel [[id number]] Collected: 10/05/24 1706    Lab Status: Final result Specimen: Blood from Arm, Right Updated: 10/05/24 1727      Sodium 137 mmol/L      Potassium 4.0 mmol/L      Chloride 101 mmol/L      CO2 27 mmol/L      ANION GAP 9 mmol/L      BUN 14 mg/dL      Creatinine 0.68 mg/dL      Glucose 138 mg/dL      Calcium 9.6 mg/dL      AST 14 U/L      ALT 13 U/L      Alkaline Phosphatase 51 U/L      Total Protein 6.8 g/dL      Albumin 4.6 g/dL      Total Bilirubin 0.63 mg/dL      eGFR 88 ml/min/1.73sq m     Narrative:      National Kid[employer] Foundation guidelines for Chronic Kidney Disease (CKD):     Stage 1 with normal or high GFR (GFR > 90 mL/min/1.73 square meters)    Stage 2 Mild CKD (GFR = 60-89 mL/min/1.73 square meters)    Stage 3A Moderate CKD (GFR = 45-59 mL/min/1.73 square meters)    Stage 3B Moderate CKD (GFR = 30-44 mL/min/1.73 square meters)    Stage 4 Severe CKD (GFR = 15-29 mL/min/1.73 square meters)    Stage 5 End Stage CKD (GFR <15 mL/min/1.73 square meters)  Note: GFR calculation is accurate only with a steady state creatinine    Lipase [[id number]]  (Normal) Collected: 10/05/24 1706    Lab Status: Final result Specimen: Blood from Arm, Right Updated: 10/05/24 1727     Lipase 12 u/L     CBC and differential [[id number]]  (Abnormal) Collected: 10/05/24 1706    Lab Status: Final result Specimen: Blood from Arm, Right Updated: 10/05/24 1713     WBC 6.99 Thousand/uL      RBC 4.47 Million/uL      Hemoglobin 13.9 g/dL      Hematocrit 43.4 %      MCV 97 fL      MCH 31.1 pg      MCHC 32.0 g/dL      RDW 12.2 %      MPV 9.3 fL      Platelets 231 Thousands/uL      nRBC 0 /100 WBCs      Segmented % 76 %      Immature Grans % 0 %      Lymphocytes % 16 %      Monocytes % 8 %      Eosinophils Relative 0 %      Basophils Relative 0 %      Absolute Neutrophils 5.28 Thousands/µL      Absolute Immature Grans 0.03 Thousand/uL      Absolute Lymphocytes 1.11 Thousands/µL      Absolute Monocytes 0.54 Thousand/µL      Eosinophils Absolute 0.02 Thousand/µL      Basophils Absolute 0.01 Thousands/µL              right upper quadrant   Final  Interpretation by Edwina Mcgraw MD (10/05 2003)      Cholelithiasis without additional sonographic findings of acute cholecystitis.      Hepatomegaly.      Workstation performed: [id number]         CT abdomen pelvis with contrast   Final Interpretation by Cesar Cabrera MD (10/05 1928)      The gallbladder is moderate distended, containing large faintly rim-calcified gallstones.   No CT evidence of acute cholecystitis.      Mild antral wall thickening, gastritis not excluded.      Pulmonary nodules measuring up to 3 mm.   Considering smoking history, follow-up chest CT can be considered in 12 months.      The study was marked in EPIC for immediate notification.         Workstation performed: [id number]             Procedures    ED Medication and Procedure Management   Prior to Admission Medications   Prescriptions Last Dose Informant Patient Reported? Taking?   Cholecalciferol (VITAMIN D3) 50 MCG (2000 UT) TABS   Yes No   Sig: Take 2,000 Units by mouth daily   Coenzyme Q10-Fish Oil-Vit E (CO-Q 10 Omega-3 Fish Oil) CAPS   Yes No   Sig: Take by mouth   Multiple Vitamin (MULTIVITAMIN) tablet   Yes No   Sig: Take 1 tablet by mouth daily   levothyroxine 88 mcg tablet   No No   Sig: take 1 tablet by mouth once daily   losartan (COZAAR) 100 MG tablet   No No   Sig: take 1 tablet by mouth once daily   magnesium 30 MG tablet   Yes No   Sig: Take 30 mg by mouth in the morning   metFORMIN (GLUCOPHAGE) 500 mg tablet   No No   Sig: take 1 tablet by mouth EVERY MORNING WITH BREAKFAST   pravastatin (PRAVACHOL) 20 mg tablet   No No   Sig: take 1 tablet by mouth once daily      Facility-Administered Medications: None     Patient's Medications   Discharge Prescriptions    No medications on file     No discharge procedures on file.  ED SEPSIS DOCUMENTATION   Time reflects when diagnosis was documented in both MDM as applicable and the Disposition within this note       Time User Action Codes Description Comment    10/5/2024   8:25 PM Aaron Stephens Add [K80.50] Biliary colic                  Bridgett Rae PA-C  10/05/24 2033

## 2024-10-05 NOTE — PROGRESS NOTES
[facility]        NAME: Asha Navarrete is a 68 y.o. female  : 1955    MRN: [id number]  DATE: 2024  TIME: 4:26 PM    Assessment and Plan   Flank pain, acute [R10.9]  1. Flank pain, acute  POCT urine dip    Transfer to other facility        ED for further evaluation.    Patient Instructions       Proceed to the ED for further evaluation.    If tests have been performed at [facility], our office will contact you with results if changes need to be made to the care plan discussed with you at the visit.  You can review your full results on [facility] MyChart.    Chief Complaint     Chief Complaint   Patient presents with    Flank Pain     R flank pain 8/10 currently, onset this morning at 9 am, increasing in severity throughout the day. Did yoga on Friday.  3 weeks ago, had similar pain after yoga, went away after a few days.         History of Present Illness       Patient is a 69 y/o/f c/o right flank pain.  Pain began this morning around 9am.  Patient did not wake with the pain this morning gradually.  Pain is located right flank region.  Patient denies any injury, no associated chest pain or shortness of breath.  Pt denies urinary issues.  Pain to this region about 2-3 weeks ago which resolved on it's own s/p 2-3 days.  Patient denies any hematuria.  Pain began both times about a day s/p performing yoga.  Pain is constant sharp 9/10 today.  Pt did take an aleve earlier today.     Flank Pain  This is a new problem. The current episode started today. The problem occurs constantly. The problem has been gradually improving since onset. Pain location: right flank pain. Pertinent negatives include no abdominal pain, chest pain, dysuria or fever.       Review of Systems   Review of Systems   Constitutional:  Negative for chills and fever.   HENT:  Negative for ear pain and sore throat.    Eyes:  Negative for pain and visual disturbance.   Respiratory:  Negative for cough and shortness of breath.     Cardiovascular:  Negative for chest pain and palpitations.   Gastrointestinal:  Negative for abdominal pain and vomiting.   Genitourinary:  Positive for flank pain. Negative for dysuria and hematuria.   Musculoskeletal:  Negative for arthralgias and back pain.   Skin:  Negative for color change and rash.   Neurological:  Negative for seizures and syncope.   All other systems reviewed and are negative.        Current Medications       Current Outpatient Medications:     Cholecalciferol (VITAMIN D3) 50 MCG (2000 UT) TABS, Take 2,000 Units by mouth daily, Disp: , Rfl:     Coenzyme Q10-Fish Oil-Vit E (CO-Q 10 Omega-3 Fish Oil) CAPS, Take by mouth, Disp: , Rfl:     levothyroxine 88 mcg tablet, take 1 tablet by mouth once daily, Disp: 90 tablet, Rfl: 1    losartan (COZAAR) 100 MG tablet, take 1 tablet by mouth once daily, Disp: 90 tablet, Rfl: 0    magnesium 30 MG tablet, Take 30 mg by mouth in the morning, Disp: , Rfl:     metFORMIN (GLUCOPHAGE) 500 mg tablet, take 1 tablet by mouth EVERY MORNING WITH BREAKFAST, Disp: 90 tablet, Rfl: 0    Multiple Vitamin (MULTIVITAMIN) tablet, Take 1 tablet by mouth daily, Disp: , Rfl:     pravastatin (PRAVACHOL) 20 mg tablet, take 1 tablet by mouth once daily, Disp: 90 tablet, Rfl: 1    Current Allergies     Allergies as of 10/05/2024 - Reviewed 10/05/2024   Allergen Reaction Noted    Penicillins  03/29/2019            The following portions of the patient's history were reviewed and updated as appropriate: allergies, current medications, past family history, past medical history, past social history, past surgical history and problem list.     Past Medical History:   Diagnosis Date    Allergic     Arthritis     Disease of thyroid gland     Hypertension     Obesity        Past Surgical History:   Procedure Laterality Date    ENDOSCOPIC RELEASE TRANSVERSE CARPAL LIGAMENT OF HAND Right     TUBAL LIGATION         Family History   Problem Relation Age of Onset    Thyroid disease Mother   "   Anxiety disorder Mother     Cancer Father     No Known Problems Daughter     No Known Problems Maternal Grandmother     No Known Problems Paternal Grandmother     Breast cancer Paternal Aunt         in her 30's    No Known Problems Paternal Aunt     No Known Problems Paternal Aunt     No Known Problems Paternal Aunt          Medications have been verified.        Objective   /80 (BP Location: Left arm, Patient Position: Sitting, Cuff Size: Standard)   Pulse 61   Temp 98.1 °F (36.7 °C) (Temporal)   Resp 18   Ht 5' 4\" (1.626 m)   Wt 73.9 kg (163 lb)   SpO2 97%   BMI 27.98 kg/m²   No LMP recorded. Patient is postmenopausal.       Physical Exam     Physical Exam  Constitutional:       Appearance: Normal appearance.   HENT:      Head: Normocephalic and atraumatic.      Nose: Nose normal.      Mouth/Throat:      Mouth: Mucous membranes are moist.   Eyes:      Extraocular Movements: Extraocular movements intact.      Conjunctiva/sclera: Conjunctivae normal.      Pupils: Pupils are equal, round, and reactive to light.   Cardiovascular:      Rate and Rhythm: Normal rate.   Pulmonary:      Effort: Pulmonary effort is normal.   Abdominal:      Tenderness: There is right CVA tenderness.       Musculoskeletal:         General: Normal range of motion.      Cervical back: Normal range of motion and neck supple.   Skin:     General: Skin is warm and dry.      Capillary Refill: Capillary refill takes less than 2 seconds.   Neurological:      General: No focal deficit present.      Mental Status: She is alert and oriented to person, place, and time.   Psychiatric:         Mood and Affect: Mood normal.         Behavior: Behavior normal.                   "

## 2024-10-06 ENCOUNTER — ANESTHESIA EVENT (OUTPATIENT)
Dept: PERIOP | Facility: HOSPITAL | Age: 69
End: 2024-10-06
Payer: MEDICARE

## 2024-10-06 ENCOUNTER — ANESTHESIA (OUTPATIENT)
Dept: PERIOP | Facility: HOSPITAL | Age: 69
End: 2024-10-06
Payer: MEDICARE

## 2024-10-06 PROBLEM — K80.12 CALCULUS OF GALLBLADDER WITH ACUTE ON CHRONIC CHOLECYSTITIS WITHOUT OBSTRUCTION: Status: ACTIVE | Noted: 2024-10-06

## 2024-10-06 LAB
ALBUMIN SERPL BCG-MCNC: 3.8 G/DL (ref 3.5–5)
ALP SERPL-CCNC: 43 U/L (ref 34–104)
ALT SERPL W P-5'-P-CCNC: 10 U/L (ref 7–52)
ANION GAP SERPL CALCULATED.3IONS-SCNC: 5 MMOL/L (ref 4–13)
AST SERPL W P-5'-P-CCNC: 11 U/L (ref 13–39)
BASOPHILS # BLD AUTO: 0 THOUSANDS/ΜL (ref 0–0.1)
BASOPHILS NFR BLD AUTO: 0 % (ref 0–1)
BILIRUB SERPL-MCNC: 0.65 MG/DL (ref 0.2–1)
BUN SERPL-MCNC: 12 MG/DL (ref 5–25)
CALCIUM SERPL-MCNC: 8.6 MG/DL (ref 8.4–10.2)
CHLORIDE SERPL-SCNC: 105 MMOL/L (ref 96–108)
CO2 SERPL-SCNC: 26 MMOL/L (ref 21–32)
CREAT SERPL-MCNC: 0.56 MG/DL (ref 0.6–1.3)
EOSINOPHIL # BLD AUTO: 0.01 THOUSAND/ΜL (ref 0–0.61)
EOSINOPHIL NFR BLD AUTO: 0 % (ref 0–6)
ERYTHROCYTE [DISTWIDTH] IN BLOOD BY AUTOMATED COUNT: 12.4 % (ref 11.6–15.1)
GFR SERPL CREATININE-BSD FRML MDRD: 94 ML/MIN/1.73SQ M
GLUCOSE P FAST SERPL-MCNC: 111 MG/DL (ref 65–99)
GLUCOSE SERPL-MCNC: 105 MG/DL (ref 65–140)
GLUCOSE SERPL-MCNC: 111 MG/DL (ref 65–140)
GLUCOSE SERPL-MCNC: 115 MG/DL (ref 65–140)
GLUCOSE SERPL-MCNC: 140 MG/DL (ref 65–140)
GLUCOSE SERPL-MCNC: 228 MG/DL (ref 65–140)
HCT VFR BLD AUTO: 36.6 % (ref 34.8–46.1)
HGB BLD-MCNC: 11.9 G/DL (ref 11.5–15.4)
IMM GRANULOCYTES # BLD AUTO: 0.01 THOUSAND/UL (ref 0–0.2)
IMM GRANULOCYTES NFR BLD AUTO: 0 % (ref 0–2)
LYMPHOCYTES # BLD AUTO: 1.54 THOUSANDS/ΜL (ref 0.6–4.47)
LYMPHOCYTES NFR BLD AUTO: 25 % (ref 14–44)
MCH RBC QN AUTO: 31.5 PG (ref 26.8–34.3)
MCHC RBC AUTO-ENTMCNC: 32.5 G/DL (ref 31.4–37.4)
MCV RBC AUTO: 97 FL (ref 82–98)
MONOCYTES # BLD AUTO: 0.6 THOUSAND/ΜL (ref 0.17–1.22)
MONOCYTES NFR BLD AUTO: 10 % (ref 4–12)
NEUTROPHILS # BLD AUTO: 3.91 THOUSANDS/ΜL (ref 1.85–7.62)
NEUTS SEG NFR BLD AUTO: 65 % (ref 43–75)
NRBC BLD AUTO-RTO: 0 /100 WBCS
PLATELET # BLD AUTO: 209 THOUSANDS/UL (ref 149–390)
PLATELET # BLD AUTO: 211 THOUSANDS/UL (ref 149–390)
PMV BLD AUTO: 9.7 FL (ref 8.9–12.7)
PMV BLD AUTO: 9.7 FL (ref 8.9–12.7)
POTASSIUM SERPL-SCNC: 3.7 MMOL/L (ref 3.5–5.3)
PROT SERPL-MCNC: 5.6 G/DL (ref 6.4–8.4)
RBC # BLD AUTO: 3.78 MILLION/UL (ref 3.81–5.12)
SODIUM SERPL-SCNC: 136 MMOL/L (ref 135–147)
WBC # BLD AUTO: 6.07 THOUSAND/UL (ref 4.31–10.16)

## 2024-10-06 PROCEDURE — 88304 TISSUE EXAM BY PATHOLOGIST: CPT | Performed by: PATHOLOGY

## 2024-10-06 PROCEDURE — 80053 COMPREHEN METABOLIC PANEL: CPT | Performed by: SURGERY

## 2024-10-06 PROCEDURE — 85025 COMPLETE CBC W/AUTO DIFF WBC: CPT | Performed by: SURGERY

## 2024-10-06 PROCEDURE — 82948 REAGENT STRIP/BLOOD GLUCOSE: CPT

## 2024-10-06 PROCEDURE — 85049 AUTOMATED PLATELET COUNT: CPT | Performed by: SURGERY

## 2024-10-06 RX ORDER — SODIUM CHLORIDE, SODIUM LACTATE, POTASSIUM CHLORIDE, CALCIUM CHLORIDE 600; 310; 30; 20 MG/100ML; MG/100ML; MG/100ML; MG/100ML
100 INJECTION, SOLUTION INTRAVENOUS CONTINUOUS
Status: ACTIVE | OUTPATIENT
Start: 2024-10-06 | End: 2024-10-06

## 2024-10-06 RX ORDER — OXYCODONE HYDROCHLORIDE 5 MG/1
5 TABLET ORAL EVERY 4 HOURS PRN
Status: DISCONTINUED | OUTPATIENT
Start: 2024-10-06 | End: 2024-10-07 | Stop reason: HOSPADM

## 2024-10-06 RX ORDER — SODIUM CHLORIDE 9 MG/ML
100 INJECTION, SOLUTION INTRAVENOUS CONTINUOUS
Status: DISPENSED | OUTPATIENT
Start: 2024-10-06 | End: 2024-10-06

## 2024-10-06 RX ORDER — ONDANSETRON 2 MG/ML
INJECTION INTRAMUSCULAR; INTRAVENOUS AS NEEDED
Status: DISCONTINUED | OUTPATIENT
Start: 2024-10-06 | End: 2024-10-06

## 2024-10-06 RX ORDER — HYDROMORPHONE HCL/PF 1 MG/ML
0.5 SYRINGE (ML) INJECTION EVERY 4 HOURS PRN
Status: DISCONTINUED | OUTPATIENT
Start: 2024-10-06 | End: 2024-10-07 | Stop reason: HOSPADM

## 2024-10-06 RX ORDER — OXYCODONE HYDROCHLORIDE 5 MG/1
5 TABLET ORAL EVERY 6 HOURS PRN
Qty: 15 TABLET | Refills: 0 | Status: SHIPPED | OUTPATIENT
Start: 2024-10-06 | End: 2024-10-16

## 2024-10-06 RX ORDER — ROCURONIUM BROMIDE 10 MG/ML
INJECTION, SOLUTION INTRAVENOUS AS NEEDED
Status: DISCONTINUED | OUTPATIENT
Start: 2024-10-06 | End: 2024-10-06

## 2024-10-06 RX ORDER — FENTANYL CITRATE 50 UG/ML
INJECTION, SOLUTION INTRAMUSCULAR; INTRAVENOUS AS NEEDED
Status: DISCONTINUED | OUTPATIENT
Start: 2024-10-06 | End: 2024-10-06

## 2024-10-06 RX ORDER — OXYCODONE HYDROCHLORIDE 10 MG/1
10 TABLET ORAL EVERY 4 HOURS PRN
Status: DISCONTINUED | OUTPATIENT
Start: 2024-10-06 | End: 2024-10-07 | Stop reason: HOSPADM

## 2024-10-06 RX ORDER — MAGNESIUM HYDROXIDE 1200 MG/15ML
LIQUID ORAL AS NEEDED
Status: DISCONTINUED | OUTPATIENT
Start: 2024-10-06 | End: 2024-10-06 | Stop reason: HOSPADM

## 2024-10-06 RX ORDER — LIDOCAINE HYDROCHLORIDE 20 MG/ML
INJECTION, SOLUTION EPIDURAL; INFILTRATION; INTRACAUDAL; PERINEURAL AS NEEDED
Status: DISCONTINUED | OUTPATIENT
Start: 2024-10-06 | End: 2024-10-06

## 2024-10-06 RX ORDER — LIDOCAINE HYDROCHLORIDE AND EPINEPHRINE 10; 10 MG/ML; UG/ML
INJECTION, SOLUTION INFILTRATION; PERINEURAL AS NEEDED
Status: DISCONTINUED | OUTPATIENT
Start: 2024-10-06 | End: 2024-10-06 | Stop reason: HOSPADM

## 2024-10-06 RX ORDER — CIPROFLOXACIN 2 MG/ML
400 INJECTION, SOLUTION INTRAVENOUS EVERY 12 HOURS
Status: DISCONTINUED | OUTPATIENT
Start: 2024-10-06 | End: 2024-10-07 | Stop reason: HOSPADM

## 2024-10-06 RX ORDER — METRONIDAZOLE 500 MG/100ML
500 INJECTION, SOLUTION INTRAVENOUS EVERY 8 HOURS
Status: COMPLETED | OUTPATIENT
Start: 2024-10-06 | End: 2024-10-07

## 2024-10-06 RX ORDER — METRONIDAZOLE 500 MG/100ML
500 INJECTION, SOLUTION INTRAVENOUS ONCE
Status: COMPLETED | OUTPATIENT
Start: 2024-10-06 | End: 2024-10-06

## 2024-10-06 RX ORDER — FENTANYL CITRATE/PF 50 MCG/ML
25 SYRINGE (ML) INJECTION
Status: DISCONTINUED | OUTPATIENT
Start: 2024-10-06 | End: 2024-10-06 | Stop reason: HOSPADM

## 2024-10-06 RX ORDER — ONDANSETRON 2 MG/ML
4 INJECTION INTRAMUSCULAR; INTRAVENOUS ONCE AS NEEDED
Status: DISCONTINUED | OUTPATIENT
Start: 2024-10-06 | End: 2024-10-06 | Stop reason: HOSPADM

## 2024-10-06 RX ORDER — SODIUM CHLORIDE, SODIUM LACTATE, POTASSIUM CHLORIDE, CALCIUM CHLORIDE 600; 310; 30; 20 MG/100ML; MG/100ML; MG/100ML; MG/100ML
INJECTION, SOLUTION INTRAVENOUS CONTINUOUS PRN
Status: DISCONTINUED | OUTPATIENT
Start: 2024-10-06 | End: 2024-10-06

## 2024-10-06 RX ORDER — PROPOFOL 10 MG/ML
INJECTION, EMULSION INTRAVENOUS AS NEEDED
Status: DISCONTINUED | OUTPATIENT
Start: 2024-10-06 | End: 2024-10-06

## 2024-10-06 RX ORDER — GLYCOPYRROLATE 0.2 MG/ML
INJECTION INTRAMUSCULAR; INTRAVENOUS AS NEEDED
Status: DISCONTINUED | OUTPATIENT
Start: 2024-10-06 | End: 2024-10-06

## 2024-10-06 RX ORDER — CIPROFLOXACIN 2 MG/ML
400 INJECTION, SOLUTION INTRAVENOUS ONCE
Status: COMPLETED | OUTPATIENT
Start: 2024-10-06 | End: 2024-10-06

## 2024-10-06 RX ORDER — LIDOCAINE HYDROCHLORIDE 10 MG/ML
INJECTION, SOLUTION EPIDURAL; INFILTRATION; INTRACAUDAL; PERINEURAL AS NEEDED
Status: DISCONTINUED | OUTPATIENT
Start: 2024-10-06 | End: 2024-10-06

## 2024-10-06 RX ORDER — DEXAMETHASONE SODIUM PHOSPHATE 10 MG/ML
INJECTION, SOLUTION INTRAMUSCULAR; INTRAVENOUS AS NEEDED
Status: DISCONTINUED | OUTPATIENT
Start: 2024-10-06 | End: 2024-10-06

## 2024-10-06 RX ORDER — LABETALOL HYDROCHLORIDE 5 MG/ML
INJECTION, SOLUTION INTRAVENOUS AS NEEDED
Status: DISCONTINUED | OUTPATIENT
Start: 2024-10-06 | End: 2024-10-06

## 2024-10-06 RX ADMIN — CIPROFLOXACIN 400 MG: 2 INJECTION, SOLUTION INTRAVENOUS at 11:07

## 2024-10-06 RX ADMIN — LOSARTAN POTASSIUM 100 MG: 50 TABLET, FILM COATED ORAL at 22:35

## 2024-10-06 RX ADMIN — SUGAMMADEX 200 MG: 100 INJECTION, SOLUTION INTRAVENOUS at 12:47

## 2024-10-06 RX ADMIN — SODIUM CHLORIDE, SODIUM LACTATE, POTASSIUM CHLORIDE, AND CALCIUM CHLORIDE: .6; .31; .03; .02 INJECTION, SOLUTION INTRAVENOUS at 11:36

## 2024-10-06 RX ADMIN — ACETAMINOPHEN 1000 MG: 10 INJECTION INTRAVENOUS at 07:05

## 2024-10-06 RX ADMIN — METRONIDAZOLE 500 MG: 500 INJECTION, SOLUTION INTRAVENOUS at 19:34

## 2024-10-06 RX ADMIN — ONDANSETRON 4 MG: 2 INJECTION INTRAMUSCULAR; INTRAVENOUS at 12:40

## 2024-10-06 RX ADMIN — GLYCOPYRROLATE 0.2 MG: 0.2 INJECTION, SOLUTION INTRAMUSCULAR; INTRAVENOUS at 11:59

## 2024-10-06 RX ADMIN — ACETAMINOPHEN 1000 MG: 10 INJECTION INTRAVENOUS at 13:25

## 2024-10-06 RX ADMIN — ROCURONIUM BROMIDE 50 MG: 10 INJECTION, SOLUTION INTRAVENOUS at 11:47

## 2024-10-06 RX ADMIN — FENTANYL CITRATE 50 MCG: 50 INJECTION, SOLUTION INTRAMUSCULAR; INTRAVENOUS at 11:47

## 2024-10-06 RX ADMIN — LABETALOL HYDROCHLORIDE 5 MG: 5 INJECTION, SOLUTION INTRAVENOUS at 12:11

## 2024-10-06 RX ADMIN — FENTANYL CITRATE 50 MCG: 50 INJECTION, SOLUTION INTRAMUSCULAR; INTRAVENOUS at 12:08

## 2024-10-06 RX ADMIN — SODIUM CHLORIDE 100 ML/HR: 0.9 INJECTION, SOLUTION INTRAVENOUS at 13:57

## 2024-10-06 RX ADMIN — CIPROFLOXACIN 400 MG: 2 INJECTION, SOLUTION INTRAVENOUS at 22:35

## 2024-10-06 RX ADMIN — HYDROMORPHONE HYDROCHLORIDE 0.5 MG: 1 INJECTION, SOLUTION INTRAMUSCULAR; INTRAVENOUS; SUBCUTANEOUS at 04:07

## 2024-10-06 RX ADMIN — SODIUM CHLORIDE 100 ML/HR: 0.9 INJECTION, SOLUTION INTRAVENOUS at 07:00

## 2024-10-06 RX ADMIN — PROPOFOL 100 MCG/KG/MIN: 10 INJECTION, EMULSION INTRAVENOUS at 11:50

## 2024-10-06 RX ADMIN — METRONIDAZOLE 500 MG: 500 INJECTION, SOLUTION INTRAVENOUS at 11:03

## 2024-10-06 RX ADMIN — HEPARIN SODIUM 5000 UNITS: 5000 INJECTION, SOLUTION INTRAVENOUS; SUBCUTANEOUS at 15:55

## 2024-10-06 RX ADMIN — LIDOCAINE HYDROCHLORIDE 100 MG: 20 INJECTION, SOLUTION EPIDURAL; INFILTRATION; INTRACAUDAL; PERINEURAL at 11:47

## 2024-10-06 RX ADMIN — OXYCODONE HYDROCHLORIDE 5 MG: 5 TABLET ORAL at 22:41

## 2024-10-06 RX ADMIN — LEVOTHYROXINE SODIUM 88 MCG: 88 TABLET ORAL at 05:40

## 2024-10-06 RX ADMIN — PROPOFOL 150 MG: 10 INJECTION, EMULSION INTRAVENOUS at 11:47

## 2024-10-06 RX ADMIN — DEXAMETHASONE SODIUM PHOSPHATE 8 MG: 10 INJECTION, SOLUTION INTRAMUSCULAR; INTRAVENOUS at 12:07

## 2024-10-06 RX ADMIN — HEPARIN SODIUM 5000 UNITS: 5000 INJECTION, SOLUTION INTRAVENOUS; SUBCUTANEOUS at 22:35

## 2024-10-06 NOTE — ANESTHESIA POSTPROCEDURE EVALUATION
Post-Op Assessment Note    CV Status:  Stable  Pain Score: 0    Pain management: adequate       Mental Status:  Arousable   Hydration Status:  Stable   PONV Controlled:  None   Airway Patency:  Patent     Post Op Vitals Reviewed: Yes    No anethesia notable event occurred.    Staff: MADONNA           BP   121/56   Temp   97.9   Pulse  56   Resp   16   SpO2   99%

## 2024-10-06 NOTE — H&P
H&P - General Surgery   Asha Navarrete 68 y.o. female MRN: [id number]  Unit/Bed#: -01 Encounter: [id number]  Assessment:  Calculus cholecystitis with acute biliary colic  Plan:  I gave the patient options regarding management.  We can conservatively manage her biliary colic and schedule her as an outpatient or take her to the operating room today to undergo a laparoscopic cholecystectomy, possible open cholecystectomy.  The patient states that she took a lot of pain medicine this morning and would prefer to have the surgery done today.  I thoroughly discussed the procedure and risks of a laparoscopic cholecystectomy, possible open cholecystectomy with the patient.  Risks include, but are not limited to, bleeding, infection, duct injury, duct leakage, bowel injury, poor wound healing, and/or hernias.  She understands and wishes to undergo the surgery today.  All of her questions and concerns were addressed.  Consent was obtained.    History of Present Illness   Chief Complaint: 1 day history of right upper back and right upper quadrant pain with nausea, no vomiting.    HPI: The patient is a 68-year-old female who states yesterday morning she developed right upper flank pain.  She did have nausea but no vomiting.  She was unable to tolerate p.o. intake.  She states she did have a bout of this in the past.  She cannot relate the symptoms to any food.  She denies any fevers or chills.  She has been having normal bowel movements.  She denies any urinary symptomatology.    Review of Systems 12 point review of systems is negative except what was discussed in HPI    Historical Information   Past Medical History:   Diagnosis Date    Allergic     Arthritis     Disease of thyroid gland     Hypertension     Obesity      Past Surgical History:   Procedure Laterality Date    ENDOSCOPIC RELEASE TRANSVERSE CARPAL LIGAMENT OF HAND Right     TUBAL LIGATION       Social History   Social History     Substance and Sexual Activity  "  Alcohol Use Yes    Comment: Maybe one or 2 beer or wine per mo th, social only     Social History     Substance and Sexual Activity   Drug Use Never     Social History     Tobacco Use   Smoking Status Former    Current packs/day: 0.25    Average packs/day: 0.3 packs/day for 15.0 years (3.8 ttl pk-yrs)    Types: Cigarettes    Passive exposure: Past   Smokeless Tobacco Never     E-Cigarette/Vaping    E-Cigarette Use Never User      E-Cigarette/Vaping Substances    Nicotine No     THC No     CBD No     Flavoring No     Other No     Unknown No      Family History: Family history non-contributory    Meds/Allergies   all medications and allergies reviewed  Allergies   Allergen Reactions    Penicillins        Objective   First Vitals:   Blood Pressure: (!) 140/105 (10/05/24 1648)  Pulse: 65 (10/05/24 1648)  Temperature: (!) 97 °F (36.1 °C) (10/05/24 1648)  Temp Source: Temporal (10/05/24 1648)  Respirations: 16 (10/05/24 1648)  Height: 5' 4\" (162.6 cm) (10/05/24 2108)  Weight - Scale: 73.5 kg (162 lb 0.6 oz) (10/05/24 2104)  SpO2: 99 % (10/05/24 1648)    Current Vitals:   Blood Pressure: 118/61 (10/06/24 0657)  Pulse: 57 (10/06/24 0657)  Temperature: 98 °F (36.7 °C) (10/06/24 0657)  Temp Source: Oral (10/05/24 2108)  Respirations: 16 (10/06/24 0657)  Height: 5' 4\" (162.6 cm) (10/05/24 2108)  Weight - Scale: 73.5 kg (162 lb 0.6 oz) (10/05/24 2104)  SpO2: 96 % (10/06/24 0657)      Intake/Output Summary (Last 24 hours) at 10/6/2024 1033  Last data filed at 10/6/2024 0949  Gross per 24 hour   Intake --   Output 100 ml   Net -100 ml       Invasive Devices       Peripheral Intravenous Line  Duration             Peripheral IV 10/05/24 Right Antecubital <1 day                    Physical Exam  General-awake and alert, nontoxic-appearing, no distress  HEENT-nonicteric  Skin-no jaundice  Heart-regular rate and rhythm  Lungs-clear to auscultation bilaterally  Abdomen-soft, nontender, nondistended, good bowel sounds, no Rangel's, no " rebound, no guarding, no hernias  Extremity-no Homans, no edema    Lab Results: I have personally reviewed pertinent lab results.    Imaging: Results Review Statement: I personally reviewed the following image studies/reports in PACS and discussed pertinent findings with Radiology: CT and Ultrasound(s). My interpretation of the radiology images/reports is: Biliary colic and cholelithiasis.  EKG, Pathology, and Other Studies:     Code Status: Level 1 - Full Code  Advance Directive and Living Will:      Power of :    POLST:      Counseling / Coordination of Care  Total floor / unit time spent today 30 minutes.  Greater than 50% of total time was spent with the patient and / or family counseling and / or coordination of care.  A description of the counseling / coordination of care: .

## 2024-10-06 NOTE — OP NOTE
OPERATIVE REPORT  PATIENT NAME: Asha Navarrete    :  1955  MRN: [id number]  Pt Location: [facility] OR ROOM 01    SURGERY DATE: 10/6/2024    Surgeons and Role:     * Lissy Lee DO - Primary     * Russell Crenshaw PA-C - Assisting    Preop Diagnosis:  Biliary colic K80.50  Calculus of gallbladder with chronic cholecystitis without obstruction K80.10      Postop diagnosis: Acute on chronic calculus cholecystitis  Fatty liver    Procedure(s):  CHOLECYSTECTOMY LAPAROSCOPIC    Specimen(s):  ID Type Source Tests Collected by Time Destination   1 : GALLBLADDER Tissue Gallbladder TISSUE EXAM Lissy Lee DO 10/6/2024 12:15 PM        Estimated Blood Loss:   10 mL    Drains:  REMOVED NG/OG/Enteral Tube Orogastric 18 Fr Center mouth (Removed)   Number of days: 0       Anesthesia Type:   General    Operative Indications:  Biliary colic K80.50  Calculus of gallbladder with chronic cholecystitis without obstruction K80.10      Operative Findings:  Enlarged gallbladder with pericholecystic fluid and omental adhesions to the fundus.  Large gallstone within gallbladder.      Complications:   None    Procedure and Technique:  After obtaining form consent, the patient was brought in the operating room and placed in supine position.  SCDs were placed and she was placed under general anesthesia.  She was then prepped and draped in usual sterile fashion.  A timeout was then performed.  Appropriate antibiotics had been administered.  Using a #15 blade, a transverse infraumbilical incision was fashioned through the skin and subcutaneous tissue.  Dissection was continued to the fascia which was then incised.  Stay sutures of 0 Vicryl placed on either side and the dissection was continued to the peritoneum which is then carefully opened.  Blunt digital inspection was utilized to make sure there are no adhesions to the anterior abdominal wall, which there were not.  A blunt 11 mm trocar was placed and a  pneumoperitoneum to 15 mmHg was then carefully established.  The patient was then placed in the reverse Trendelenburg position and banked towards the left.  Under direct laparoscopic visualization, utilizing transillumination, an 11 mm subxiphoid trocar and 2 right 5 mm paramedian trocars were placed.  The gallbladder was grasped at its fundus and retracted cephalad.  Omental adhesions were carefully dissected free from the fundus of the gallbladder.  A second grasper was used to grasp near the infundibulum and retracted laterally.  Dissection was performed near the infundibulum and the cystic duct was identified and fully skeletonized.  The cystic artery was also identified and fully skeletonized.  Fibrofatty tissue was dissected free.  There were no other structures within the triangle.  The critical view of safety was fully seen.  Endoclips were then placed proximally and distally on the cystic duct and it was cut.  Endoclips were placed proximally and distally on the cystic artery and it was cut.  There was a small posterior branch of the cystic artery which was also clipped and cut.  The gallbladder was carefully dissected free from the subhepatic space using EndoShears and cautery.  The liver bed was somewhat friable but controlled with electrocautery.  Once the gallbladder was fully removed from the subhepatic space it was placed into an Endo Catch bag and removed from the subxiphoid trocar site.  The liver bed was reinspected and there was a little bile cannulicula of the liver bed that was oozing bile.  An Endo Clip was placed on this cannulicula successfully.  There was no further oozing of bile.  Hemostasis of the liver bed was maintained with cautery.  The area was then copiously irrigated with saline solution.  All sponge, needle, and instrument counts were correct.  The fascia of the subxiphoid trocar site was closed under direct laparoscopic visualization using the Eriberto Moran device with a 0  Vicryl suture.  An 0 Vicryl suture was also used to reapproximate the fascia in this area.  The other trocars removed under direct visualization and there was no active hemorrhaging from any of the trocar sites.  The pneumoperitoneum was fully released from the abdominal cavity.  The fascia of the infraumbilical incision was closed with a figure-of-eight 0 Vicryl suture.  The incisions were then injected with local anesthetic.  Skin incisions were closed with running subcuticular stitches of 4-0 Monocryl suture.  The incisions were cleansed well and benzoin, Steri-Strips, and sterile dressings were applied.  The patient was extubated in the operating room without difficulty.  The patient tolerated the procedure well was transferred to recovery in stable and satisfactory condition.   I was present for the entire procedure.    Patient Disposition:  PACU              SIGNATURE: Lissy Lee DO  DATE: October 6, 2024  TIME: 12:48 PM

## 2024-10-06 NOTE — NURSING NOTE
Pre op checklist completed,  at bedside. Pt NPO since midnight. VSS, BG Stable. Jewelry with . Attending at bedside signing consent.

## 2024-10-06 NOTE — PLAN OF CARE
Problem: PAIN - ADULT  Goal: Verbalizes/displays adequate comfort level or baseline comfort level  Description: Interventions:  - Encourage patient to monitor pain and request assistance  - Assess pain using appropriate pain scale  - Administer analgesics based on type and severity of pain and evaluate response  - Implement non-pharmacological measures as appropriate and evaluate response  - Consider cultural and social influences on pain and pain management  - Notify physician/advanced practitioner if interventions unsuccessful or patient reports new pain  Outcome: Progressing     Problem: INFECTION - ADULT  Goal: Absence or prevention of progression during hospitalization  Description: INTERVENTIONS:  - Assess and monitor for signs and symptoms of infection  - Monitor lab/diagnostic results  - Monitor all insertion sites, i.e. indwelling lines, tubes, and drains  - Monitor endotracheal if appropriate and nasal secretions for changes in amount and color  - [place] appropriate cooling/warming therapies per order  - Administer medications as ordered  - Instruct and encourage patient and family to use good hand hygiene technique  - Identify and instruct in appropriate isolation precautions for identified infection/condition  Outcome: Progressing     Problem: INFECTION - ADULT  Goal: Absence of fever/infection during neutropenic period  Description: INTERVENTIONS:  - Monitor WBC    Outcome: Progressing

## 2024-10-06 NOTE — ANESTHESIA PREPROCEDURE EVALUATION
Procedure:  CHOLECYSTECTOMY LAPAROSCOPIC (Abdomen)    Relevant Problems   CARDIO   (+) Essential hypertension   (+) Hyperlipidemia      ENDO   (+) Acquired hypothyroidism   (+) Type 2 diabetes mellitus with hyperglycemia, without long-term current use of insulin (HCC)      MUSCULOSKELETAL   (+) Atrophy of muscle of right hand   (+) Chronic bilateral low back pain without sciatica   (+) Lumbar degenerative disc disease      NEURO/PSYCH   (+) Chronic bilateral low back pain without sciatica   (+) Chronic pain syndrome      Orthopedic/Musculoskeletal   (+) Lateral knee pain, left   (+) Right wrist pain   (+) Spinal stenosis of lumbar region without neurogenic claudication      Lab Results   Component Value Date    SODIUM 136 10/06/2024    K 3.7 10/06/2024     10/06/2024    CO2 26 10/06/2024    AGAP 5 10/06/2024    BUN 12 10/06/2024    CREATININE 0.56 (L) 10/06/2024    GLUC 111 10/06/2024    GLUF 111 (H) 10/06/2024    CALCIUM 8.6 10/06/2024    AST 11 (L) 10/06/2024    ALT 10 10/06/2024    ALKPHOS 43 10/06/2024    TP 5.6 (L) 10/06/2024    TBILI 0.65 10/06/2024    EGFR 94 10/06/2024     Lab Results   Component Value Date    WBC 6.07 10/06/2024    HGB 11.9 10/06/2024    HCT 36.6 10/06/2024    MCV 97 10/06/2024     10/06/2024     10/06/2024       Physical Exam    Airway    Mallampati score: II  TM Distance: >3 FB  Neck ROM: full     Dental        Cardiovascular      Pulmonary      Other Findings  post-pubertal.      Anesthesia Plan  ASA Score- 2     Anesthesia Type- general with ASA Monitors.         Additional Monitors:     Airway Plan: ETT.           Plan Factors-Exercise tolerance (METS): >4 METS.    Chart reviewed. EKG reviewed. Imaging results reviewed. Existing labs reviewed. Patient summary reviewed.                  Induction- intravenous.    Postoperative Plan-     Perioperative Resuscitation Plan - Level 1 - Full Code.       Informed Consent- Anesthetic plan and risks discussed with patient.  I  personally reviewed this patient with the CRNA. Discussed and agreed on the Anesthesia Plan with the CRNA..

## 2024-10-06 NOTE — DISCHARGE INSTR - AVS FIRST PAGE
[facility]    Discharge Instructions  Light activity for 2 weeks.  No heavy lifting for 2 weeks.  Max 10 lbs for 2 weeks.  No driving for 3-7 days or until pain is well controlled.  Remove dressings tomorrow.  Leave steri strips on incisions until they fall off.  You may shower starting tomorrow.  Take discharge medications as prescribed.  Notify our office for nausea, vomiting, fever, diarrhea, chest pain, trouble breathing.  Follow up in our office in 2 weeks or sooner if needed.  Call with additional questions or concerns [phone].    For pain you may take ibuprofen 600 mg every 6 hours scheduled for 3 days then as needed.  You can also take acetaminophen 650 mg every 6 hours scheduled for 3 days then as needed.  For ongoing pain you can alternate ibuprofen and acetaminophen every 3 hours.  If pain not controlled with this regimen you can use Oxycodone as prescribed.  Ice packs may be helpful, 20 min on, 20 min off alternating and monitoring skin.

## 2024-10-06 NOTE — NURSING NOTE
AWAKE AND RESTING IN BED. R/A STATUS AND 02  HR 57/MIN. IVF CONTINUE AS ORDERED. ABDOMEN IS SOFT AND TENDER 1/10 AND DRESSINGS ARE DRY AND INTACT TO LAP SITES. CALL BELL NEAR

## 2024-10-06 NOTE — PLAN OF CARE
Problem: PAIN - ADULT  Goal: Verbalizes/displays adequate comfort level or baseline comfort level  Description: Interventions:  - Encourage patient to monitor pain and request assistance  - Assess pain using appropriate pain scale  - Administer analgesics based on type and severity of pain and evaluate response  - Implement non-pharmacological measures as appropriate and evaluate response  - Consider cultural and social influences on pain and pain management  - Notify physician/advanced practitioner if interventions unsuccessful or patient reports new pain  Outcome: Progressing     Problem: INFECTION - ADULT  Goal: Absence or prevention of progression during hospitalization  Description: INTERVENTIONS:  - Assess and monitor for signs and symptoms of infection  - Monitor lab/diagnostic results  - Monitor all insertion sites, i.e. indwelling lines, tubes, and drains  - Monitor endotracheal if appropriate and nasal secretions for changes in amount and color  - [place] appropriate cooling/warming therapies per order  - Administer medications as ordered  - Instruct and encourage patient and family to use good hand hygiene technique  - Identify and instruct in appropriate isolation precautions for identified infection/condition  Outcome: Progressing  Goal: Absence of fever/infection during neutropenic period  Description: INTERVENTIONS:  - Monitor WBC    Outcome: Progressing     Problem: SAFETY ADULT  Goal: Patient will remain free of falls  Description: INTERVENTIONS:  - Educate patient/family on patient safety including physical limitations  - Instruct patient to call for assistance with activity   - Consult OT/PT to assist with strengthening/mobility   - Keep Call bell within reach  - Keep bed low and locked with side rails adjusted as appropriate  - Keep care items and personal belongings within reach  - Initiate and maintain comfort rounds  - Make Fall Risk Sign visible to staff  - Offer Toileting every 2 Hours,  in advance of need  - Initiate/Maintain bed chair alarm  - Obtain necessary fall risk management equipment: non slip socks  - Apply yellow socks and bracelet for high fall risk patients  - Consider moving patient to room near nurses station  Outcome: Progressing  Goal: Maintain or return to baseline ADL function  Description: INTERVENTIONS:  -  Assess patient's ability to carry out ADLs; assess patient's baseline for ADL function and identify physical deficits which impact ability to perform ADLs (bathing, care of mouth/teeth, toileting, grooming, dressing, etc.)  - Assess/evaluate cause of self-care deficits   - Assess range of motion  - Assess patient's mobility; develop plan if impaired  - Assess patient's need for assistive devices and provide as appropriate  - Encourage maximum independence but intervene and supervise when necessary  - Involve family in performance of ADLs  - Assess for home care needs following discharge   - Consider OT consult to assist with ADL evaluation and planning for discharge  - Provide patient education as appropriate  Outcome: Progressing  Goal: Maintains/Returns to pre admission functional level  Description: INTERVENTIONS:  - Perform AM-PAC 6 Click Basic Mobility/ Daily Activity assessment daily.  - Set and communicate daily mobility goal to care team and patient/family/caregiver.   - Collaborate with rehabilitation services on mobility goals if consulted  - Perform Range of Motion 2 times a day.  - Reposition patient every 2 hours.  - Dangle patient 3 times a day  - Stand patient 3 times a day  - Ambulate patient 3 times a day  - Out of bed to chair 3 times a day   - Out of bed for meals 3 times a day  - Out of bed for toileting  - Record patient progress and toleration of activity level   Outcome: Progressing     Problem: DISCHARGE PLANNING  Goal: Discharge to home or other facility with appropriate resources  Description: INTERVENTIONS:  - Identify barriers to discharge  w/patient and caregiver  - Arrange for needed discharge resources and transportation as appropriate  - Identify discharge learning needs (meds, wound care, etc.)  - Arrange for interpretive services to assist at discharge as needed  - Refer to Case Management Department for coordinating discharge planning if the patient needs post-hospital services based on physician/advanced practitioner order or complex needs related to functional status, cognitive ability, or social support system  Outcome: Progressing     Problem: Knowledge Deficit  Goal: Patient/family/caregiver demonstrates understanding of disease process, treatment plan, medications, and discharge instructions  Description: Complete learning assessment and assess knowledge base.  Interventions:  - Provide teaching at level of understanding  - Provide teaching via preferred learning methods  Outcome: Progressing

## 2024-10-07 ENCOUNTER — HOSPITAL ENCOUNTER (OUTPATIENT)
Dept: BONE DENSITY | Facility: HOSPITAL | Age: 69
Discharge: HOME | End: 2024-10-07
Payer: MEDICARE

## 2024-10-07 ENCOUNTER — HOSPITAL ENCOUNTER (OUTPATIENT)
Dept: MAMMOGRAPHY | Facility: HOSPITAL | Age: 69
Discharge: HOME | End: 2024-10-07
Payer: MEDICARE

## 2024-10-07 VITALS
HEART RATE: 52 BPM | TEMPERATURE: 97.7 F | DIASTOLIC BLOOD PRESSURE: 77 MMHG | WEIGHT: 162.04 LBS | BODY MASS INDEX: 27.66 KG/M2 | OXYGEN SATURATION: 92 % | HEIGHT: 64 IN | RESPIRATION RATE: 16 BRPM | SYSTOLIC BLOOD PRESSURE: 155 MMHG

## 2024-10-07 VITALS — HEIGHT: 62 IN | WEIGHT: 163 LBS | BODY MASS INDEX: 30 KG/M2

## 2024-10-07 DIAGNOSIS — E28.39 PRIMARY OVARIAN FAILURE: ICD-10-CM

## 2024-10-07 DIAGNOSIS — Z12.31 ENCOUNTER FOR SCREENING MAMMOGRAM FOR BREAST CANCER: ICD-10-CM

## 2024-10-07 LAB
ALBUMIN SERPL BCG-MCNC: 3.9 G/DL (ref 3.5–5)
ALP SERPL-CCNC: 42 U/L (ref 34–104)
ALT SERPL W P-5'-P-CCNC: 90 U/L (ref 7–52)
ANION GAP SERPL CALCULATED.3IONS-SCNC: 5 MMOL/L (ref 4–13)
AST SERPL W P-5'-P-CCNC: 72 U/L (ref 13–39)
BASOPHILS # BLD AUTO: 0.01 THOUSANDS/ΜL (ref 0–0.1)
BASOPHILS NFR BLD AUTO: 0 % (ref 0–1)
BILIRUB SERPL-MCNC: 0.8 MG/DL (ref 0.2–1)
BUN SERPL-MCNC: 8 MG/DL (ref 5–25)
CALCIUM SERPL-MCNC: 8.8 MG/DL (ref 8.4–10.2)
CHLORIDE SERPL-SCNC: 105 MMOL/L (ref 96–108)
CO2 SERPL-SCNC: 27 MMOL/L (ref 21–32)
CREAT SERPL-MCNC: 0.59 MG/DL (ref 0.6–1.3)
EOSINOPHIL # BLD AUTO: 0 THOUSAND/ΜL (ref 0–0.61)
EOSINOPHIL NFR BLD AUTO: 0 % (ref 0–6)
ERYTHROCYTE [DISTWIDTH] IN BLOOD BY AUTOMATED COUNT: 12.5 % (ref 11.6–15.1)
GFR SERPL CREATININE-BSD FRML MDRD: 93 ML/MIN/1.73SQ M
GLUCOSE P FAST SERPL-MCNC: 105 MG/DL (ref 65–99)
GLUCOSE SERPL-MCNC: 102 MG/DL (ref 65–140)
GLUCOSE SERPL-MCNC: 105 MG/DL (ref 65–140)
GLUCOSE SERPL-MCNC: 145 MG/DL (ref 65–140)
HCT VFR BLD AUTO: 38.9 % (ref 34.8–46.1)
HGB BLD-MCNC: 12.2 G/DL (ref 11.5–15.4)
IMM GRANULOCYTES # BLD AUTO: 0.01 THOUSAND/UL (ref 0–0.2)
IMM GRANULOCYTES NFR BLD AUTO: 0 % (ref 0–2)
LYMPHOCYTES # BLD AUTO: 1.81 THOUSANDS/ΜL (ref 0.6–4.47)
LYMPHOCYTES NFR BLD AUTO: 23 % (ref 14–44)
MCH RBC QN AUTO: 31.2 PG (ref 26.8–34.3)
MCHC RBC AUTO-ENTMCNC: 31.4 G/DL (ref 31.4–37.4)
MCV RBC AUTO: 100 FL (ref 82–98)
MONOCYTES # BLD AUTO: 0.83 THOUSAND/ΜL (ref 0.17–1.22)
MONOCYTES NFR BLD AUTO: 11 % (ref 4–12)
NEUTROPHILS # BLD AUTO: 5.28 THOUSANDS/ΜL (ref 1.85–7.62)
NEUTS SEG NFR BLD AUTO: 66 % (ref 43–75)
NRBC BLD AUTO-RTO: 0 /100 WBCS
PLATELET # BLD AUTO: 193 THOUSANDS/UL (ref 149–390)
PMV BLD AUTO: 9.7 FL (ref 8.9–12.7)
POTASSIUM SERPL-SCNC: 4 MMOL/L (ref 3.5–5.3)
PROT SERPL-MCNC: 6 G/DL (ref 6.4–8.4)
RBC # BLD AUTO: 3.91 MILLION/UL (ref 3.81–5.12)
SODIUM SERPL-SCNC: 137 MMOL/L (ref 135–147)
WBC # BLD AUTO: 7.94 THOUSAND/UL (ref 4.31–10.16)

## 2024-10-07 PROCEDURE — 77067 SCR MAMMO BI INCL CAD: CPT

## 2024-10-07 PROCEDURE — 77063 BREAST TOMOSYNTHESIS BI: CPT

## 2024-10-07 PROCEDURE — 99238 HOSP IP/OBS DSCHRG MGMT 30/<: CPT

## 2024-10-07 PROCEDURE — 85025 COMPLETE CBC W/AUTO DIFF WBC: CPT

## 2024-10-07 PROCEDURE — 82948 REAGENT STRIP/BLOOD GLUCOSE: CPT

## 2024-10-07 PROCEDURE — 80053 COMPREHEN METABOLIC PANEL: CPT

## 2024-10-07 PROCEDURE — 77080 DXA BONE DENSITY AXIAL: CPT

## 2024-10-07 RX ORDER — ONDANSETRON 4 MG/1
4 TABLET, FILM COATED ORAL EVERY 8 HOURS PRN
Qty: 20 TABLET | Refills: 0 | Status: SHIPPED | OUTPATIENT
Start: 2024-10-07 | End: 2024-10-18 | Stop reason: ALTCHOICE

## 2024-10-07 RX ADMIN — LEVOTHYROXINE SODIUM 88 MCG: 88 TABLET ORAL at 05:11

## 2024-10-07 RX ADMIN — METRONIDAZOLE 500 MG: 500 INJECTION, SOLUTION INTRAVENOUS at 02:21

## 2024-10-07 RX ADMIN — METFORMIN HYDROCHLORIDE 500 MG: 500 TABLET ORAL at 07:34

## 2024-10-07 RX ADMIN — HEPARIN SODIUM 5000 UNITS: 5000 INJECTION, SOLUTION INTRAVENOUS; SUBCUTANEOUS at 05:11

## 2024-10-07 NOTE — PLAN OF CARE
Problem: PAIN - ADULT  Goal: Verbalizes/displays adequate comfort level or baseline comfort level  Description: Interventions:  - Encourage patient to monitor pain and request assistance  - Assess pain using appropriate pain scale  - Administer analgesics based on type and severity of pain and evaluate response  - Implement non-pharmacological measures as appropriate and evaluate response  - Consider cultural and social influences on pain and pain management  - Notify physician/advanced practitioner if interventions unsuccessful or patient reports new pain  Outcome: Progressing     Problem: INFECTION - ADULT  Goal: Absence or prevention of progression during hospitalization  Description: INTERVENTIONS:  - Assess and monitor for signs and symptoms of infection  - Monitor lab/diagnostic results  - Monitor all insertion sites, i.e. indwelling lines, tubes, and drains  - Monitor endotracheal if appropriate and nasal secretions for changes in amount and color  - [place] appropriate cooling/warming therapies per order  - Administer medications as ordered  - Instruct and encourage patient and family to use good hand hygiene technique  - Identify and instruct in appropriate isolation precautions for identified infection/condition  Outcome: Progressing     Problem: SAFETY ADULT  Goal: Maintain or return to baseline ADL function  Description: INTERVENTIONS:  -  Assess patient's ability to carry out ADLs; assess patient's baseline for ADL function and identify physical deficits which impact ability to perform ADLs (bathing, care of mouth/teeth, toileting, grooming, dressing, etc.)  - Assess/evaluate cause of self-care deficits   - Assess range of motion  - Assess patient's mobility; develop plan if impaired  - Assess patient's need for assistive devices and provide as appropriate  - Encourage maximum independence but intervene and supervise when necessary  - Involve family in performance of ADLs  - Assess for home care  needs following discharge   - Consider OT consult to assist with ADL evaluation and planning for discharge  - Provide patient education as appropriate  Outcome: Progressing     Problem: Knowledge Deficit  Goal: Patient/family/caregiver demonstrates understanding of disease process, treatment plan, medications, and discharge instructions  Description: Complete learning assessment and assess knowledge base.  Interventions:  - Provide teaching at level of understanding  - Provide teaching via preferred learning methods  Outcome: Progressing

## 2024-10-07 NOTE — DISCHARGE SUMMARY
"Discharge Summary - Surgery-General   Name: Asha Navarrete 68 y.o. female I MRN: [id number]  Unit/Bed#: -01 I Date of Admission: 10/5/2024   Date of Service: 10/7/2024 I Hospital Day: 0    Admission Date:   Admission Orders (From admission, onward)       Ordered        10/05/24 2025  Place in Observation  Once                           Discharge Date:  10/7/24    Admitting Diagnosis: Biliary colic [K80.50]  Flank pain [R10.9]  Discharge Diagnosis: Biliary colic [K80.50]  Flank pain [R10.9]      Procedures Performed: 10/6/24- laparoscopic cholecystectomy by Dr Lee    HPI: Per Dr Lissy Lee H&P 10/6: \"The patient is a 68-year-old female who states yesterday morning she developed right upper flank pain.  She did have nausea but no vomiting.  She was unable to tolerate p.o. intake.  She states she did have a bout of this in the past.  She cannot relate the symptoms to any food.  She denies any fevers or chills.  She has been having normal bowel movements.  She denies any urinary symptomatology.\"     Hospital Course: Patient presented to the [facility] ED 10/5 with right flank pain. RUQ US was consistent with gallstones. Patient was admitted to the general surgery service. Patient had a laparoscopic cholecystectomy 10/5. Patient tolerated the procedure well. This morning her diet was advanced. She had some nausea but that has subsided. She tolerated her breakfast. She feels well enough to go home. Pain medicine was sent to her pharmacy as well as zofran for nausea. Discharge instructions were reviewed with patient. She understands she will need to follow up with general surgery for a postop appointment. Patient is in agreement with the discharge and follow up plan.     Physical Exam  Constitutional:       Appearance: Normal appearance.   HENT:      Head: Normocephalic and atraumatic.      Nose: Nose normal.      Mouth/Throat:      Mouth: Mucous membranes are moist.      Pharynx: Oropharynx is clear.   Eyes: "      Conjunctiva/sclera: Conjunctivae normal.      Pupils: Pupils are equal, round, and reactive to light.   Cardiovascular:      Rate and Rhythm: Normal rate.   Pulmonary:      Effort: Pulmonary effort is normal.   Abdominal:      General: Abdomen is flat.      Palpations: Abdomen is soft.      Tenderness: There is abdominal tenderness.      Comments: Incisions c/d/i   Musculoskeletal:         General: Normal range of motion.   Skin:     General: Skin is warm and dry.   Neurological:      General: No focal deficit present.      Mental Status: She is alert.   Psychiatric:         Mood and Affect: Mood normal.          Condition at Discharge: good     Discharge instructions/Information to patient and family:   See after visit summary for information provided to patient and family.      Provisions for Follow-Up Care:  See after visit summary for information related to follow-up care and any pertinent home health orders.      Disposition: Home        Planned Readmission: No    Discharge Statement:  I have spent a total time of 20 minutes in caring for this patient on the day of the visit/encounter.     Discharge Medications:  See after visit summary for reconciled discharge medications provided to patient and family.      Amanda Jean PA-C

## 2024-10-07 NOTE — NURSING NOTE
IV site removed.  Patient with some mild nausea, stated she will feel better when she gets home.  Discharge instructions given to patient, verbalized understanding.  Patient discharged via wheelchair to car accompanied by RN and spouse

## 2024-10-08 NOTE — ANESTHESIA POSTPROCEDURE EVALUATION
Post-Op Assessment Note    CV Status:  Stable    Pain management: adequate       Mental Status:  Alert and awake   Hydration Status:  Euvolemic   PONV Controlled:  Controlled   Airway Patency:  Patent     Post Op Vitals Reviewed: Yes    No anethesia notable event occurred.    Staff: Anesthesiologist           Last Filed PACU Vitals:  Vitals Value Taken Time   Temp 98.4 °F (36.9 °C) 10/06/24 1304   Pulse 54 10/06/24 1338   /67 10/06/24 1335   Resp 25 10/06/24 1338   SpO2 93 % 10/06/24 1338   Vitals shown include unfiled device data.    Modified Dharmesh:  No data recorded

## 2024-10-09 PROCEDURE — 88304 TISSUE EXAM BY PATHOLOGIST: CPT | Performed by: PATHOLOGY

## 2024-10-10 NOTE — RESULT ENCOUNTER NOTE
Hudson Barry  I reviewed your mammogram and it was was normal. We recommend repeating in one year for maintenance. Have a great day.   Sushant

## 2024-10-18 NOTE — PROGRESS NOTES
Ambulatory Visit  Name: Asha Navarrete      : 1955      MRN: [id number]  Encounter Provider: Abdelrahman George MD  Encounter Date: 10/21/2024   Encounter department: [facility] GENERAL SURGERY [place]    Assessment & Plan  Calculus of gallbladder with acute on chronic cholecystitis without obstruction  Returns for routine scheduled follow-up status post laparoscopic cholecystectomy for the definitive treatment of her acute calculus cholecystitis.    Patient voiced no complaints referable to her abdomen.    On physical examination she is well-appearing.  Pleasant competent reliable as a historian.  Her abdomen is soft.  Her surgical wounds are clean dry and intact.  She is nontender to percussion and palpation in 4 quadrants.    The patient is clinically stable status post laparoscopic cholecystectomy for the treatment of her acute calculus cholecystitis.    The operative report and pathology reports reviewed with the patient and copies provided to her for her permanent record.    All questions answered to the satisfaction of the patient.    She has been encouraged to follow-up with our practice at any point in the future with questions or concerns.           History of Present Illness     Asha Navarrete is a 68 y.o. female who presents for her po visit after having a lap gerard 10/6/2024. Pt states the incisions are healed and denies any drainage, redness/irritation or any bulging. Pt developed a rash on the inc from the adhesives after surgery but after applying CERAV recommenced by Derm; it went away. Pt had some nausea with a bad taste after taking flagyl the day after surgery but that has subsided. Bowels are normal and she takes a stool softener daily as a prevention from getting constipation. Pt denies abd pain, issues with urination,  trouble eating/drinking/swallowing or fevers/chills. [email or web address]    History obtained from : patient  Review of Systems   Constitutional:  Negative for chills and  fever.   HENT:  Negative for ear pain and sore throat.    Eyes:  Negative for pain and visual disturbance.   Respiratory:  Negative for cough and shortness of breath.    Cardiovascular:  Negative for chest pain and palpitations.   Gastrointestinal:  Negative for abdominal pain and vomiting.   Genitourinary:  Negative for dysuria and hematuria.   Musculoskeletal:  Negative for arthralgias and back pain.   Skin:  Negative for color change and rash.   Neurological:  Negative for seizures and syncope.   All other systems reviewed and are negative.    Pertinent Medical History         Medical History Reviewed by provider this encounter:       Past Medical History   Past Medical History:   Diagnosis Date    Allergic     Arthritis     Disease of thyroid gland     Hypertension     Obesity      Past Surgical History:   Procedure Laterality Date    CHOLECYSTECTOMY LAPAROSCOPIC N/A 10/6/2024    Procedure: CHOLECYSTECTOMY LAPAROSCOPIC;  Surgeon: Lissy Lee DO;  Location: [facility];  Service: General    ENDOSCOPIC RELEASE TRANSVERSE CARPAL LIGAMENT OF HAND Right     TUBAL LIGATION       Family History   Problem Relation Age of Onset    Thyroid disease Mother     Anxiety disorder Mother     Cancer Father     No Known Problems Daughter     No Known Problems Maternal Grandmother     No Known Problems Paternal Grandmother     Breast cancer Paternal Aunt         in her 30's    No Known Problems Paternal Aunt     No Known Problems Paternal Aunt     No Known Problems Paternal Aunt      Current Outpatient Medications on File Prior to Visit   Medication Sig Dispense Refill    Cholecalciferol (VITAMIN D3) 50 MCG (2000 UT) TABS Take 2,000 Units by mouth daily      Coenzyme Q10-Fish Oil-Vit E (CO-Q 10 Omega-3 Fish Oil) CAPS Take by mouth      levothyroxine 88 mcg tablet take 1 tablet by mouth once daily 90 tablet 1    losartan (COZAAR) 100 MG tablet take 1 tablet by mouth once daily 90 tablet 0    magnesium 30 MG tablet Take 30 mg  by mouth in the morning      metFORMIN (GLUCOPHAGE) 500 mg tablet take 1 tablet by mouth EVERY MORNING WITH BREAKFAST 90 tablet 0    Multiple Vitamin (MULTIVITAMIN) tablet Take 1 tablet by mouth daily      pravastatin (PRAVACHOL) 20 mg tablet take 1 tablet by mouth once daily 90 tablet 1    Probiotic Product (PROBIOTIC DAILY PO) Take by mouth Product is called ISRA       No current facility-administered medications on file prior to visit.     Allergies   Allergen Reactions    Penicillins       Current Outpatient Medications on File Prior to Visit   Medication Sig Dispense Refill    Cholecalciferol (VITAMIN D3) 50 MCG (2000 UT) TABS Take 2,000 Units by mouth daily      Coenzyme Q10-Fish Oil-Vit E (CO-Q 10 Omega-3 Fish Oil) CAPS Take by mouth      levothyroxine 88 mcg tablet take 1 tablet by mouth once daily 90 tablet 1    losartan (COZAAR) 100 MG tablet take 1 tablet by mouth once daily 90 tablet 0    magnesium 30 MG tablet Take 30 mg by mouth in the morning      metFORMIN (GLUCOPHAGE) 500 mg tablet take 1 tablet by mouth EVERY MORNING WITH BREAKFAST 90 tablet 0    Multiple Vitamin (MULTIVITAMIN) tablet Take 1 tablet by mouth daily      pravastatin (PRAVACHOL) 20 mg tablet take 1 tablet by mouth once daily 90 tablet 1    Probiotic Product (PROBIOTIC DAILY PO) Take by mouth Product is called ISRA       No current facility-administered medications on file prior to visit.      Social History     Tobacco Use    Smoking status: Former     Current packs/day: 0.25     Average packs/day: 0.3 packs/day for 15.0 years (3.8 ttl pk-yrs)     Types: Cigarettes     Passive exposure: Past    Smokeless tobacco: Never   Vaping Use    Vaping status: Never Used   Substance and Sexual Activity    Alcohol use: Yes     Comment: Maybe one or 2 beer or wine per mo th, social only    Drug use: Never    Sexual activity: Not Currently     Birth control/protection: Post-menopausal         Objective     /80 (BP Location: Left arm, Patient  "Position: Sitting, Cuff Size: Standard)   Pulse 64   Temp 98.4 °F (36.9 °C) (Temporal)   Resp 14   Ht 5' 2\" (1.575 m)   Wt 73.9 kg (163 lb)   SpO2 99%   BMI 29.81 kg/m²     Physical Exam  Vitals and nursing note reviewed.   Constitutional:       General: She is not in acute distress.     Appearance: She is well-developed.   HENT:      Head: Normocephalic and atraumatic.   Eyes:      Conjunctiva/sclera: Conjunctivae normal.   Cardiovascular:      Rate and Rhythm: Normal rate and regular rhythm.      Heart sounds: No murmur heard.  Pulmonary:      Effort: Pulmonary effort is normal. No respiratory distress.      Breath sounds: Normal breath sounds.   Abdominal:      Palpations: Abdomen is soft.      Tenderness: There is no abdominal tenderness.   Musculoskeletal:         General: No swelling.      Cervical back: Neck supple.   Skin:     General: Skin is warm and dry.      Capillary Refill: Capillary refill takes less than 2 seconds.   Neurological:      Mental Status: She is alert.   Psychiatric:         Mood and Affect: Mood normal.       Administrative Statements   I have spent a total time of 15 minutes in caring for this patient on the day of the visit/encounter including Instructions for management.  "

## 2024-10-21 ENCOUNTER — OFFICE VISIT (OUTPATIENT)
Dept: SURGERY | Facility: CLINIC | Age: 69
End: 2024-10-21

## 2024-10-21 VITALS
SYSTOLIC BLOOD PRESSURE: 130 MMHG | HEIGHT: 62 IN | BODY MASS INDEX: 30 KG/M2 | WEIGHT: 163 LBS | TEMPERATURE: 98.4 F | OXYGEN SATURATION: 99 % | HEART RATE: 64 BPM | DIASTOLIC BLOOD PRESSURE: 80 MMHG | RESPIRATION RATE: 14 BRPM

## 2024-10-21 DIAGNOSIS — K80.12 CALCULUS OF GALLBLADDER WITH ACUTE ON CHRONIC CHOLECYSTITIS WITHOUT OBSTRUCTION: Primary | ICD-10-CM

## 2024-10-21 PROCEDURE — 99024 POSTOP FOLLOW-UP VISIT: CPT | Performed by: SURGERY

## 2024-10-21 NOTE — LETTER
2024     Lissy Lee, DO   [facility]  1st Floor  [facility] [address]    Patient: Asha Navarrete   YOB: 1955   Date of Visit: 10/21/2024       Dear Dr. Lee:    Thank you for referring Asha Navarrete to me for evaluation. Below are my notes for this consultation.    If you have questions, please do not hesitate to call me. I look forward to following your patient along with you.         Sincerely,        Abdelrahman George MD        CC: No Recipients    Abdelrahman George MD  10/21/2024  9:43 AM  Sign when Signing Visit  Ambulatory Visit  Name: Asha Navarrete      : 1955      MRN: [id number]  Encounter Provider: Abdelrahman George MD  Encounter Date: 10/21/2024   Encounter department: [facility] GENERAL SURGERY [place]    Assessment & Plan  Calculus of gallbladder with acute on chronic cholecystitis without obstruction  Returns for routine scheduled follow-up status post laparoscopic cholecystectomy for the definitive treatment of her acute calculus cholecystitis.    Patient voiced no complaints referable to her abdomen.    On physical examination she is well-appearing.  Pleasant competent reliable as a historian.  Her abdomen is soft.  Her surgical wounds are clean dry and intact.  She is nontender to percussion and palpation in 4 quadrants.    The patient is clinically stable status post laparoscopic cholecystectomy for the treatment of her acute calculus cholecystitis.    The operative report and pathology reports reviewed with the patient and copies provided to her for her permanent record.    All questions answered to the satisfaction of the patient.    She has been encouraged to follow-up with our practice at any point in the future with questions or concerns.           History of Present Illness    Asha Navarrete is a 68 y.o. female who presents for her po visit after having a lap gerard 10/6/2024. Pt states the incisions are healed and denies any  drainage, redness/irritation or any bulging. Pt developed a rash on the inc from the adhesives after surgery but after applying CERAV recommenced by Derm; it went away. Pt had some nausea with a bad taste after taking flagyl the day after surgery but that has subsided. Bowels are normal and she takes a stool softener daily as a prevention from getting constipation. Pt denies abd pain, issues with urination,  trouble eating/drinking/swallowing or fevers/chills. Amilia.O,MA    History obtained from : patient  Review of Systems   Constitutional:  Negative for chills and fever.   HENT:  Negative for ear pain and sore throat.    Eyes:  Negative for pain and visual disturbance.   Respiratory:  Negative for cough and shortness of breath.    Cardiovascular:  Negative for chest pain and palpitations.   Gastrointestinal:  Negative for abdominal pain and vomiting.   Genitourinary:  Negative for dysuria and hematuria.   Musculoskeletal:  Negative for arthralgias and back pain.   Skin:  Negative for color change and rash.   Neurological:  Negative for seizures and syncope.   All other systems reviewed and are negative.    Pertinent Medical History        Medical History Reviewed by provider this encounter:       Past Medical History  Past Medical History:   Diagnosis Date   • Allergic    • Arthritis    • Disease of thyroid gland    • Hypertension    • Obesity      Past Surgical History:   Procedure Laterality Date   • CHOLECYSTECTOMY LAPAROSCOPIC N/A 10/6/2024    Procedure: CHOLECYSTECTOMY LAPAROSCOPIC;  Surgeon: Lissy Lee DO;  Location: [facility];  Service: General   • ENDOSCOPIC RELEASE TRANSVERSE CARPAL LIGAMENT OF HAND Right    • TUBAL LIGATION       Family History   Problem Relation Age of Onset   • Thyroid disease Mother    • Anxiety disorder Mother    • Cancer Father    • No Known Problems Daughter    • No Known Problems Maternal Grandmother    • No Known Problems Paternal Grandmother    • Breast cancer  Paternal Aunt         in her 30's   • No Known Problems Paternal Aunt    • No Known Problems Paternal Aunt    • No Known Problems Paternal Aunt      Current Outpatient Medications on File Prior to Visit   Medication Sig Dispense Refill   • Cholecalciferol (VITAMIN D3) 50 MCG (2000 UT) TABS Take 2,000 Units by mouth daily     • Coenzyme Q10-Fish Oil-Vit E (CO-Q 10 Omega-3 Fish Oil) CAPS Take by mouth     • levothyroxine 88 mcg tablet take 1 tablet by mouth once daily 90 tablet 1   • losartan (COZAAR) 100 MG tablet take 1 tablet by mouth once daily 90 tablet 0   • magnesium 30 MG tablet Take 30 mg by mouth in the morning     • metFORMIN (GLUCOPHAGE) 500 mg tablet take 1 tablet by mouth EVERY MORNING WITH BREAKFAST 90 tablet 0   • Multiple Vitamin (MULTIVITAMIN) tablet Take 1 tablet by mouth daily     • pravastatin (PRAVACHOL) 20 mg tablet take 1 tablet by mouth once daily 90 tablet 1   • Probiotic Product (PROBIOTIC DAILY PO) Take by mouth Product is called ISRA       No current facility-administered medications on file prior to visit.     Allergies   Allergen Reactions   • Penicillins       Current Outpatient Medications on File Prior to Visit   Medication Sig Dispense Refill   • Cholecalciferol (VITAMIN D3) 50 MCG (2000 UT) TABS Take 2,000 Units by mouth daily     • Coenzyme Q10-Fish Oil-Vit E (CO-Q 10 Omega-3 Fish Oil) CAPS Take by mouth     • levothyroxine 88 mcg tablet take 1 tablet by mouth once daily 90 tablet 1   • losartan (COZAAR) 100 MG tablet take 1 tablet by mouth once daily 90 tablet 0   • magnesium 30 MG tablet Take 30 mg by mouth in the morning     • metFORMIN (GLUCOPHAGE) 500 mg tablet take 1 tablet by mouth EVERY MORNING WITH BREAKFAST 90 tablet 0   • Multiple Vitamin (MULTIVITAMIN) tablet Take 1 tablet by mouth daily     • pravastatin (PRAVACHOL) 20 mg tablet take 1 tablet by mouth once daily 90 tablet 1   • Probiotic Product (PROBIOTIC DAILY PO) Take by mouth Product is called ISRA       No current  "facility-administered medications on file prior to visit.      Social History     Tobacco Use   • Smoking status: Former     Current packs/day: 0.25     Average packs/day: 0.3 packs/day for 15.0 years (3.8 ttl pk-yrs)     Types: Cigarettes     Passive exposure: Past   • Smokeless tobacco: Never   Vaping Use   • Vaping status: Never Used   Substance and Sexual Activity   • Alcohol use: Yes     Comment: Maybe one or 2 beer or wine per mo th, social only   • Drug use: Never   • Sexual activity: Not Currently     Birth control/protection: Post-menopausal         Objective    /80 (BP Location: Left arm, Patient Position: Sitting, Cuff Size: Standard)   Pulse 64   Temp 98.4 °F (36.9 °C) (Temporal)   Resp 14   Ht 5' 2\" (1.575 m)   Wt 73.9 kg (163 lb)   SpO2 99%   BMI 29.81 kg/m²     Physical Exam  Vitals and nursing note reviewed.   Constitutional:       General: She is not in acute distress.     Appearance: She is well-developed.   HENT:      Head: Normocephalic and atraumatic.   Eyes:      Conjunctiva/sclera: Conjunctivae normal.   Cardiovascular:      Rate and Rhythm: Normal rate and regular rhythm.      Heart sounds: No murmur heard.  Pulmonary:      Effort: Pulmonary effort is normal. No respiratory distress.      Breath sounds: Normal breath sounds.   Abdominal:      Palpations: Abdomen is soft.      Tenderness: There is no abdominal tenderness.   Musculoskeletal:         General: No swelling.      Cervical back: Neck supple.   Skin:     General: Skin is warm and dry.      Capillary Refill: Capillary refill takes less than 2 seconds.   Neurological:      Mental Status: She is alert.   Psychiatric:         Mood and Affect: Mood normal.       Administrative Statements  I have spent a total time of 15 minutes in caring for this patient on the day of the visit/encounter including Instructions for management.  "

## 2024-10-21 NOTE — ASSESSMENT & PLAN NOTE
Returns for routine scheduled follow-up status post laparoscopic cholecystectomy for the definitive treatment of her acute calculus cholecystitis.    Patient voiced no complaints referable to her abdomen.    On physical examination she is well-appearing.  Pleasant competent reliable as a historian.  Her abdomen is soft.  Her surgical wounds are clean dry and intact.  She is nontender to percussion and palpation in 4 quadrants.    The patient is clinically stable status post laparoscopic cholecystectomy for the treatment of her acute calculus cholecystitis.    The operative report and pathology reports reviewed with the patient and copies provided to her for her permanent record.    All questions answered to the satisfaction of the patient.    She has been encouraged to follow-up with our practice at any point in the future with questions or concerns.

## 2024-12-04 DIAGNOSIS — E03.9 ACQUIRED HYPOTHYROIDISM: ICD-10-CM

## 2024-12-04 DIAGNOSIS — E78.5 HYPERLIPIDEMIA, UNSPECIFIED HYPERLIPIDEMIA TYPE: ICD-10-CM

## 2024-12-05 RX ORDER — PRAVASTATIN SODIUM 20 MG
20 TABLET ORAL DAILY
Qty: 30 TABLET | Refills: 0 | Status: SHIPPED | OUTPATIENT
Start: 2024-12-05

## 2024-12-05 RX ORDER — LEVOTHYROXINE SODIUM 88 UG/1
88 TABLET ORAL DAILY
Qty: 30 TABLET | Refills: 0 | Status: SHIPPED | OUTPATIENT
Start: 2024-12-05

## 2024-12-28 DIAGNOSIS — I10 ESSENTIAL HYPERTENSION: ICD-10-CM

## 2024-12-30 RX ORDER — LOSARTAN POTASSIUM 100 MG/1
100 TABLET ORAL DAILY
Qty: 90 TABLET | Refills: 1 | Status: SHIPPED | OUTPATIENT
Start: 2024-12-30

## 2025-01-04 DIAGNOSIS — E78.5 HYPERLIPIDEMIA, UNSPECIFIED HYPERLIPIDEMIA TYPE: ICD-10-CM

## 2025-01-04 DIAGNOSIS — E03.9 ACQUIRED HYPOTHYROIDISM: ICD-10-CM

## 2025-01-06 ENCOUNTER — RESULTS FOLLOW-UP (OUTPATIENT)
Dept: URGENT CARE | Facility: CLINIC | Age: 70
End: 2025-01-06

## 2025-01-06 ENCOUNTER — OFFICE VISIT (OUTPATIENT)
Dept: URGENT CARE | Facility: CLINIC | Age: 70
End: 2025-01-06
Payer: MEDICARE

## 2025-01-06 ENCOUNTER — APPOINTMENT (OUTPATIENT)
Dept: RADIOLOGY | Facility: CLINIC | Age: 70
End: 2025-01-06
Payer: MEDICARE

## 2025-01-06 VITALS
HEART RATE: 66 BPM | OXYGEN SATURATION: 99 % | SYSTOLIC BLOOD PRESSURE: 138 MMHG | TEMPERATURE: 97.6 F | RESPIRATION RATE: 18 BRPM | DIASTOLIC BLOOD PRESSURE: 71 MMHG

## 2025-01-06 DIAGNOSIS — M25.532 WRIST PAIN, ACUTE, LEFT: Primary | ICD-10-CM

## 2025-01-06 DIAGNOSIS — M25.532 WRIST PAIN, ACUTE, LEFT: ICD-10-CM

## 2025-01-06 PROCEDURE — G0463 HOSPITAL OUTPT CLINIC VISIT: HCPCS

## 2025-01-06 PROCEDURE — 73110 X-RAY EXAM OF WRIST: CPT

## 2025-01-06 PROCEDURE — 99214 OFFICE O/P EST MOD 30 MIN: CPT

## 2025-01-06 RX ORDER — LEVOTHYROXINE SODIUM 88 UG/1
88 TABLET ORAL DAILY
Qty: 30 TABLET | Refills: 0 | Status: SHIPPED | OUTPATIENT
Start: 2025-01-06

## 2025-01-06 RX ORDER — PRAVASTATIN SODIUM 20 MG
20 TABLET ORAL DAILY
Qty: 30 TABLET | Refills: 0 | Status: SHIPPED | OUTPATIENT
Start: 2025-01-06

## 2025-01-06 NOTE — PROGRESS NOTES
[facility]        NAME: Asha Navarrete is a 69 y.o. female  : 1955    MRN: [id number]  DATE: 2025  TIME: 10:37 AM    Assessment and Plan   Wrist pain, acute, left [M25.532]  1. Wrist pain, acute, left  XR wrist 3+ vw left        Xray left wrist obtained. Xray imaging reviewed and negative for any acute osseous abnormality. Pending final read from radiology.  Suspect chronic changes and pain with known arthritis.  Patient instructed to follow-up with PCP with no improvement or worsening of symptoms.    Patient Instructions     Patient Instructions   May take OTC Tylenol or Ibuprofen as needed for pain.   May apply OTC Lidoderm, Biofreeze, IcyHot, etc.     RICE is an acronym for a first aid treatment method that involves rest, ice, compression, and elevation. It is commonly used in the treatment of sprains, strains, contusions, dislocations, or uncomplicated fractures.  It can help to relieve pain and swelling, promote flexibility, and speed up healing.     Rest - avoid using the injured area and stop the activity that caused the injury  Ice - apply an ice pack or cold gel pack wrapped in a towel to the injured area for 20 minutes every 4 hours   Compression - wrap the injured area with an elastic bandage (ACE) to reduce swelling and provide support  Elevation - keep the injured area raised above the level of the heart to drain fluid and reduce swelling     If your symptoms do not improve with our current treatment plan or worsen, please schedule an appointment with your PCP or proceed to the ER.       If your symptoms do not improve with our current treatment plan or worsen, please schedule an appointment with your PCP or proceed to the ER.      Follow up with PCP in 3-5 days.  Proceed to  ER if symptoms worsen.    Chief Complaint     Chief Complaint   Patient presents with   • Wrist Pain     Left wrist pain started a week  ago has a bump          History of Present Illness        69-year-old female presents to the clinic for evaluation of left wrist pain x 6 days.  Patient reports she was moving boxes when a box fell down and she caught it and she noticed a little bump on her left wrist.  She does not remember the box falling directly onto her wrist however she is not sure if she hurt her wrist during this incident.  She denies any swelling, redness, or bruising to the area.  However she does state that she does have some tenderness to the bump on her wrist when touching it.  She states her pain is a 0 out of 10 at rest and a 3 out of 10 when touching.  She describes the pain as a dull aching pain.  She denies any OTC medications or treatments for symptoms.  She denies any fevers, chills, congestion, chest pain, shortness of breath, nausea, vomiting, diarrhea, or dizziness.  Of note she does have a history of arthritis.      Wrist Pain   Pertinent negatives include no fever.       Review of Systems   Review of Systems   Constitutional:  Negative for chills and fever.   HENT:  Negative for congestion and sore throat.    Respiratory:  Negative for cough, chest tightness and shortness of breath.    Cardiovascular:  Negative for chest pain and palpitations.   Gastrointestinal:  Negative for diarrhea, nausea and vomiting.   Musculoskeletal:  Positive for arthralgias. Negative for joint swelling.   Skin:  Negative for color change, rash and wound.   Neurological:  Negative for dizziness, light-headedness and headaches.         Current Medications       Current Outpatient Medications:   •  Cholecalciferol (VITAMIN D3) 50 MCG (2000 UT) TABS, Take 2,000 Units by mouth daily, Disp: , Rfl:   •  Coenzyme Q10-Fish Oil-Vit E (CO-Q 10 Omega-3 Fish Oil) CAPS, Take by mouth, Disp: , Rfl:   •  levothyroxine 88 mcg tablet, take 1 tablet by mouth once daily, Disp: 30 tablet, Rfl: 0  •  losartan (COZAAR) 100 MG tablet, take 1 tablet by mouth once daily, Disp: 90 tablet, Rfl: 1  •  magnesium 30 MG tablet,  Take 30 mg by mouth in the morning, Disp: , Rfl:   •  metFORMIN (GLUCOPHAGE) 500 mg tablet, take 1 tablet by mouth EVERY MORNING WITH BREAKFAST, Disp: 90 tablet, Rfl: 0  •  Multiple Vitamin (MULTIVITAMIN) tablet, Take 1 tablet by mouth daily, Disp: , Rfl:   •  pravastatin (PRAVACHOL) 20 mg tablet, take 1 tablet by mouth once daily, Disp: 30 tablet, Rfl: 0  •  Probiotic Product (PROBIOTIC DAILY PO), Take by mouth Product is called ISRA, Disp: , Rfl:     Current Allergies     Allergies as of 01/06/2025 - Reviewed 01/06/2025   Allergen Reaction Noted   • Penicillins  03/29/2019            The following portions of the patient's history were reviewed and updated as appropriate: allergies, current medications, past family history, past medical history, past social history, past surgical history and problem list.     Past Medical History:   Diagnosis Date   • Allergic    • Arthritis    • Disease of thyroid gland    • Hypertension    • Obesity        Past Surgical History:   Procedure Laterality Date   • CHOLECYSTECTOMY LAPAROSCOPIC N/A 10/6/2024    Procedure: CHOLECYSTECTOMY LAPAROSCOPIC;  Surgeon: Lissy Lee DO;  Location: [facility];  Service: General   • ENDOSCOPIC RELEASE TRANSVERSE CARPAL LIGAMENT OF HAND Right    • TUBAL LIGATION         Family History   Problem Relation Age of Onset   • Thyroid disease Mother    • Anxiety disorder Mother    • Cancer Father    • No Known Problems Daughter    • No Known Problems Maternal Grandmother    • No Known Problems Paternal Grandmother    • Breast cancer Paternal Aunt         in her 30's   • No Known Problems Paternal Aunt    • No Known Problems Paternal Aunt    • No Known Problems Paternal Aunt          Medications have been verified.        Objective   /71   Pulse 66   Temp 97.6 °F (36.4 °C)   Resp 18   SpO2 99%        Physical Exam     Physical Exam  Vitals and nursing note reviewed.   Constitutional:       Appearance: Normal appearance.   HENT:       Head: Normocephalic and atraumatic.   Cardiovascular:      Rate and Rhythm: Normal rate and regular rhythm.      Pulses: Normal pulses.      Heart sounds: Normal heart sounds.   Pulmonary:      Effort: Pulmonary effort is normal.      Breath sounds: Normal breath sounds.   Abdominal:      General: Bowel sounds are normal.      Palpations: Abdomen is soft.   Musculoskeletal:         General: No swelling. Normal range of motion.      Left wrist: Bony tenderness present. No swelling. Normal range of motion.        Arms:    Skin:     General: Skin is warm and dry.   Neurological:      General: No focal deficit present.      Mental Status: She is alert.   Psychiatric:         Mood and Affect: Mood normal.         Behavior: Behavior normal.

## 2025-01-06 NOTE — PATIENT INSTRUCTIONS
May take OTC Tylenol or Ibuprofen as needed for pain.   May apply OTC Lidoderm, Biofreeze, IcyHot, etc.     RICE is an acronym for a first aid treatment method that involves rest, ice, compression, and elevation. It is commonly used in the treatment of sprains, strains, contusions, dislocations, or uncomplicated fractures.  It can help to relieve pain and swelling, promote flexibility, and speed up healing.     Rest - avoid using the injured area and stop the activity that caused the injury  Ice - apply an ice pack or cold gel pack wrapped in a towel to the injured area for 20 minutes every 4 hours   Compression - wrap the injured area with an elastic bandage (ACE) to reduce swelling and provide support  Elevation - keep the injured area raised above the level of the heart to drain fluid and reduce swelling     If your symptoms do not improve with our current treatment plan or worsen, please schedule an appointment with your PCP or proceed to the ER.

## 2025-01-22 DIAGNOSIS — E11.65 TYPE 2 DIABETES MELLITUS WITH HYPERGLYCEMIA, WITHOUT LONG-TERM CURRENT USE OF INSULIN (HCC): ICD-10-CM

## 2025-02-04 DIAGNOSIS — E03.9 ACQUIRED HYPOTHYROIDISM: ICD-10-CM

## 2025-02-04 DIAGNOSIS — E78.5 HYPERLIPIDEMIA, UNSPECIFIED HYPERLIPIDEMIA TYPE: ICD-10-CM

## 2025-02-04 RX ORDER — PRAVASTATIN SODIUM 20 MG
20 TABLET ORAL DAILY
Qty: 30 TABLET | Refills: 0 | Status: SHIPPED | OUTPATIENT
Start: 2025-02-04

## 2025-02-04 RX ORDER — LEVOTHYROXINE SODIUM 88 UG/1
88 TABLET ORAL DAILY
Qty: 30 TABLET | Refills: 0 | Status: SHIPPED | OUTPATIENT
Start: 2025-02-04

## 2025-02-14 ENCOUNTER — APPOINTMENT (OUTPATIENT)
Dept: LAB | Facility: CLINIC | Age: 70
End: 2025-02-14
Payer: MEDICARE

## 2025-02-14 DIAGNOSIS — Z13.0 SCREENING FOR DEFICIENCY ANEMIA: ICD-10-CM

## 2025-02-14 DIAGNOSIS — E11.65 TYPE 2 DIABETES MELLITUS WITH HYPERGLYCEMIA, WITHOUT LONG-TERM CURRENT USE OF INSULIN (HCC): ICD-10-CM

## 2025-02-14 DIAGNOSIS — E55.9 VITAMIN D DEFICIENCY: ICD-10-CM

## 2025-02-14 DIAGNOSIS — E78.5 HYPERLIPIDEMIA, UNSPECIFIED HYPERLIPIDEMIA TYPE: ICD-10-CM

## 2025-02-14 DIAGNOSIS — E03.9 ACQUIRED HYPOTHYROIDISM: ICD-10-CM

## 2025-02-14 LAB
25(OH)D3 SERPL-MCNC: 45.4 NG/ML (ref 30–100)
ALBUMIN SERPL BCG-MCNC: 4.5 G/DL (ref 3.5–5)
ALP SERPL-CCNC: 65 U/L (ref 34–104)
ALT SERPL W P-5'-P-CCNC: 14 U/L (ref 7–52)
ANION GAP SERPL CALCULATED.3IONS-SCNC: 10 MMOL/L (ref 4–13)
AST SERPL W P-5'-P-CCNC: 14 U/L (ref 13–39)
BASOPHILS # BLD AUTO: 0.02 THOUSANDS/ÂΜL (ref 0–0.1)
BASOPHILS NFR BLD AUTO: 1 % (ref 0–1)
BILIRUB SERPL-MCNC: 0.84 MG/DL (ref 0.2–1)
BUN SERPL-MCNC: 9 MG/DL (ref 5–25)
CALCIUM SERPL-MCNC: 9.8 MG/DL (ref 8.4–10.2)
CHLORIDE SERPL-SCNC: 101 MMOL/L (ref 96–108)
CHOLEST SERPL-MCNC: 179 MG/DL (ref ?–200)
CO2 SERPL-SCNC: 31 MMOL/L (ref 21–32)
CREAT SERPL-MCNC: 0.55 MG/DL (ref 0.6–1.3)
CREAT UR-MCNC: 46.3 MG/DL
EOSINOPHIL # BLD AUTO: 0.05 THOUSAND/ÂΜL (ref 0–0.61)
EOSINOPHIL NFR BLD AUTO: 1 % (ref 0–6)
ERYTHROCYTE [DISTWIDTH] IN BLOOD BY AUTOMATED COUNT: 12.6 % (ref 11.6–15.1)
EST. AVERAGE GLUCOSE BLD GHB EST-MCNC: 120 MG/DL
GFR SERPL CREATININE-BSD FRML MDRD: 94 ML/MIN/1.73SQ M
GLUCOSE P FAST SERPL-MCNC: 100 MG/DL (ref 65–99)
HBA1C MFR BLD: 5.8 %
HCT VFR BLD AUTO: 43.3 % (ref 34.8–46.1)
HDLC SERPL-MCNC: 72 MG/DL
HGB BLD-MCNC: 14.3 G/DL (ref 11.5–15.4)
IMM GRANULOCYTES # BLD AUTO: 0.01 THOUSAND/UL (ref 0–0.2)
IMM GRANULOCYTES NFR BLD AUTO: 0 % (ref 0–2)
LDLC SERPL CALC-MCNC: 87 MG/DL (ref 0–100)
LYMPHOCYTES # BLD AUTO: 1.53 THOUSANDS/ÂΜL (ref 0.6–4.47)
LYMPHOCYTES NFR BLD AUTO: 35 % (ref 14–44)
MCH RBC QN AUTO: 31.6 PG (ref 26.8–34.3)
MCHC RBC AUTO-ENTMCNC: 33 G/DL (ref 31.4–37.4)
MCV RBC AUTO: 96 FL (ref 82–98)
MICROALBUMIN UR-MCNC: <7 MG/L
MONOCYTES # BLD AUTO: 0.42 THOUSAND/ÂΜL (ref 0.17–1.22)
MONOCYTES NFR BLD AUTO: 10 % (ref 4–12)
NEUTROPHILS # BLD AUTO: 2.35 THOUSANDS/ÂΜL (ref 1.85–7.62)
NEUTS SEG NFR BLD AUTO: 53 % (ref 43–75)
NONHDLC SERPL-MCNC: 107 MG/DL
NRBC BLD AUTO-RTO: 0 /100 WBCS
PLATELET # BLD AUTO: 205 THOUSANDS/UL (ref 149–390)
PMV BLD AUTO: 10.6 FL (ref 8.9–12.7)
POTASSIUM SERPL-SCNC: 3.8 MMOL/L (ref 3.5–5.3)
PROT SERPL-MCNC: 6.8 G/DL (ref 6.4–8.4)
RBC # BLD AUTO: 4.52 MILLION/UL (ref 3.81–5.12)
SODIUM SERPL-SCNC: 142 MMOL/L (ref 135–147)
TRIGL SERPL-MCNC: 100 MG/DL (ref ?–150)
TSH SERPL DL<=0.05 MIU/L-ACNC: 0.53 UIU/ML (ref 0.45–4.5)
WBC # BLD AUTO: 4.38 THOUSAND/UL (ref 4.31–10.16)

## 2025-02-14 PROCEDURE — 82043 UR ALBUMIN QUANTITATIVE: CPT

## 2025-02-14 PROCEDURE — 85025 COMPLETE CBC W/AUTO DIFF WBC: CPT

## 2025-02-14 PROCEDURE — 82306 VITAMIN D 25 HYDROXY: CPT

## 2025-02-14 PROCEDURE — 83036 HEMOGLOBIN GLYCOSYLATED A1C: CPT

## 2025-02-14 PROCEDURE — 80061 LIPID PANEL: CPT

## 2025-02-14 PROCEDURE — 36415 COLL VENOUS BLD VENIPUNCTURE: CPT

## 2025-02-14 PROCEDURE — 80053 COMPREHEN METABOLIC PANEL: CPT

## 2025-02-14 PROCEDURE — 84443 ASSAY THYROID STIM HORMONE: CPT

## 2025-02-14 PROCEDURE — 82570 ASSAY OF URINE CREATININE: CPT

## 2025-02-20 ENCOUNTER — RESULTS FOLLOW-UP (OUTPATIENT)
Dept: INTERNAL MEDICINE CLINIC | Facility: CLINIC | Age: 70
End: 2025-02-20

## 2025-02-20 NOTE — RESULT ENCOUNTER NOTE
Hudson Barry  I received and reviewed your recent labs and everything looked good. Please reach out with any specific questions or concerns. Have a great day  -Benita

## 2025-03-05 DIAGNOSIS — E03.9 ACQUIRED HYPOTHYROIDISM: ICD-10-CM

## 2025-03-05 DIAGNOSIS — E78.5 HYPERLIPIDEMIA, UNSPECIFIED HYPERLIPIDEMIA TYPE: ICD-10-CM

## 2025-03-06 RX ORDER — PRAVASTATIN SODIUM 20 MG
20 TABLET ORAL DAILY
Qty: 30 TABLET | Refills: 5 | Status: SHIPPED | OUTPATIENT
Start: 2025-03-06

## 2025-03-06 RX ORDER — LEVOTHYROXINE SODIUM 88 UG/1
88 TABLET ORAL DAILY
Qty: 30 TABLET | Refills: 5 | Status: SHIPPED | OUTPATIENT
Start: 2025-03-06

## 2025-03-11 ENCOUNTER — APPOINTMENT (OUTPATIENT)
Dept: RADIOLOGY | Facility: CLINIC | Age: 70
End: 2025-03-11
Payer: MEDICARE

## 2025-03-11 ENCOUNTER — OFFICE VISIT (OUTPATIENT)
Dept: INTERNAL MEDICINE CLINIC | Facility: CLINIC | Age: 70
End: 2025-03-11
Payer: MEDICARE

## 2025-03-11 VITALS
OXYGEN SATURATION: 99 % | HEIGHT: 62 IN | HEART RATE: 73 BPM | SYSTOLIC BLOOD PRESSURE: 130 MMHG | BODY MASS INDEX: 30.18 KG/M2 | WEIGHT: 164 LBS | TEMPERATURE: 97.8 F | DIASTOLIC BLOOD PRESSURE: 72 MMHG

## 2025-03-11 DIAGNOSIS — M25.512 ACUTE PAIN OF LEFT SHOULDER: ICD-10-CM

## 2025-03-11 DIAGNOSIS — Z23 ENCOUNTER FOR IMMUNIZATION: ICD-10-CM

## 2025-03-11 DIAGNOSIS — M25.512 ACUTE PAIN OF LEFT SHOULDER: Primary | ICD-10-CM

## 2025-03-11 DIAGNOSIS — E11.65 TYPE 2 DIABETES MELLITUS WITH HYPERGLYCEMIA, WITHOUT LONG-TERM CURRENT USE OF INSULIN (HCC): ICD-10-CM

## 2025-03-11 DIAGNOSIS — Z12.11 SCREENING FOR COLORECTAL CANCER: ICD-10-CM

## 2025-03-11 DIAGNOSIS — Z12.12 SCREENING FOR COLORECTAL CANCER: ICD-10-CM

## 2025-03-11 DIAGNOSIS — E66.01 OBESITY, MORBID (HCC): ICD-10-CM

## 2025-03-11 PROCEDURE — 99213 OFFICE O/P EST LOW 20 MIN: CPT | Performed by: PHYSICIAN ASSISTANT

## 2025-03-11 PROCEDURE — 73030 X-RAY EXAM OF SHOULDER: CPT

## 2025-03-11 PROCEDURE — G2211 COMPLEX E/M VISIT ADD ON: HCPCS | Performed by: PHYSICIAN ASSISTANT

## 2025-03-13 PROBLEM — M25.512 ACUTE PAIN OF LEFT SHOULDER: Status: ACTIVE | Noted: 2025-03-13

## 2025-03-13 NOTE — ASSESSMENT & PLAN NOTE
Will get xray. Orthopedic referral provided.   Orders:  •  XR shoulder 2+ vw left; Future  •  Ambulatory Referral to Orthopedic Surgery; Future

## 2025-03-13 NOTE — PROGRESS NOTES
Name: Asha Navarrete      : 1955      MRN: [id number]  Encounter Provider: Benita Santos PA-C  Encounter Date: 3/11/2025   Encounter department: [facility]  :  Assessment & Plan  Encounter for immunization    Orders:  •  Pneumococcal Conjugate Vaccine 20-valent (Pcv20)    Screening for colorectal cancer    Orders:  •  Cologuard    Obesity, morbid (HCC)           Type 2 diabetes mellitus with hyperglycemia, without long-term current use of insulin (HCC)    Lab Results   Component Value Date    HGBA1C 5.8 (H) 2025          Acute pain of left shoulder  Will get xray. Orthopedic referral provided.   Orders:  •  XR shoulder 2+ vw left; Future  •  Ambulatory Referral to Orthopedic Surgery; Future          Depression Screening and Follow-up Plan: Patient was screened for depression during today's encounter. They screened negative with a PHQ-2 score of 0.        History of Present Illness   Pt presents for routine visit She is up to fate with mammogram, labs, AWV, diabetic foot and diabetic eye exams and CRC screening. She feels well overall. She is noting issues with chronic left shoulder pain. ROM intact. She has not had any imaging. She is interested in seeing [facility] for an opinion or possible injection as she has an established relationship with them. Pain is very localized to left AC joint. She has a remote hx of clavicle fx on that side.       Review of Systems   Constitutional:  Negative for chills and fever.   HENT:  Negative for congestion, ear pain, hearing loss, postnasal drip, rhinorrhea, sinus pressure, sinus pain, sore throat and trouble swallowing.    Eyes:  Negative for pain and visual disturbance.   Respiratory:  Negative for cough, chest tightness, shortness of breath and wheezing.    Cardiovascular: Negative.  Negative for chest pain, palpitations and leg swelling.   Gastrointestinal:  Negative for abdominal pain, blood in stool, constipation, diarrhea, nausea and  "vomiting.   Endocrine: Negative for cold intolerance, heat intolerance, polydipsia, polyphagia and polyuria.   Genitourinary:  Negative for difficulty urinating, dysuria, flank pain and urgency.   Musculoskeletal:  Positive for arthralgias. Negative for back pain, gait problem and myalgias.   Skin:  Negative for rash.   Allergic/Immunologic: Negative.    Neurological:  Negative for dizziness, weakness, light-headedness and headaches.   Hematological: Negative.    Psychiatric/Behavioral:  Negative for behavioral problems, dysphoric mood and sleep disturbance. The patient is not nervous/anxious.        Objective   /72 (Patient Position: Sitting, Cuff Size: Large)   Pulse 73   Temp 97.8 °F (36.6 °C) (Tympanic)   Ht 5' 2\" (1.575 m)   Wt 74.4 kg (164 lb)   SpO2 99%   BMI 30.00 kg/m²      Physical Exam  Vitals and nursing note reviewed.   Constitutional:       Appearance: She is well-developed.   HENT:      Head: Normocephalic and atraumatic.      Right Ear: External ear normal.      Left Ear: External ear normal.      Nose: Nose normal.   Eyes:      Conjunctiva/sclera: Conjunctivae normal.   Cardiovascular:      Rate and Rhythm: Normal rate and regular rhythm.      Heart sounds: Normal heart sounds.   Pulmonary:      Effort: Pulmonary effort is normal.      Breath sounds: Normal breath sounds.   Abdominal:      General: Bowel sounds are normal.      Palpations: Abdomen is soft.   Musculoskeletal:         General: Normal range of motion.      Left shoulder: Tenderness present.      Cervical back: Normal range of motion and neck supple.   Skin:     General: Skin is warm and dry.   Neurological:      Mental Status: She is alert and oriented to person, place, and time.   Psychiatric:         Behavior: Behavior normal.         Thought Content: Thought content normal.         Judgment: Judgment normal.         "

## 2025-03-17 ENCOUNTER — APPOINTMENT (OUTPATIENT)
Dept: LAB | Facility: CLINIC | Age: 70
End: 2025-03-17

## 2025-03-17 DIAGNOSIS — Z11.1 TUBERCULOSIS SCREENING: ICD-10-CM

## 2025-03-17 PROCEDURE — 36415 COLL VENOUS BLD VENIPUNCTURE: CPT

## 2025-03-17 PROCEDURE — 86480 TB TEST CELL IMMUN MEASURE: CPT

## 2025-03-19 LAB
GAMMA INTERFERON BACKGROUND BLD IA-ACNC: 0.01 IU/ML
M TB IFN-G BLD-IMP: NEGATIVE
M TB IFN-G CD4+ BCKGRND COR BLD-ACNC: 0.01 IU/ML
M TB IFN-G CD4+ BCKGRND COR BLD-ACNC: 0.02 IU/ML
MITOGEN IGNF BCKGRD COR BLD-ACNC: 6.31 IU/ML

## 2025-04-22 DIAGNOSIS — E11.65 TYPE 2 DIABETES MELLITUS WITH HYPERGLYCEMIA, WITHOUT LONG-TERM CURRENT USE OF INSULIN (HCC): ICD-10-CM

## 2025-05-02 LAB — COLOGUARD RESULT REPORTABLE: NEGATIVE

## 2025-05-05 ENCOUNTER — RESULTS FOLLOW-UP (OUTPATIENT)
Dept: INTERNAL MEDICINE CLINIC | Facility: CLINIC | Age: 70
End: 2025-05-05

## 2025-05-05 NOTE — RESULT ENCOUNTER NOTE
Hudson Barry  Your cologuard was negative, we recommend repeat screening in 3 years. Have a good week  -Benita

## 2025-07-02 ENCOUNTER — TELEPHONE (OUTPATIENT)
Dept: INTERNAL MEDICINE CLINIC | Facility: CLINIC | Age: 70
End: 2025-07-02

## 2025-07-02 NOTE — TELEPHONE ENCOUNTER
Left message paper work is completed and ready for      Copied and placed in the reception area file drawer

## 2025-07-09 DIAGNOSIS — E03.9 ACQUIRED HYPOTHYROIDISM: ICD-10-CM

## 2025-07-09 DIAGNOSIS — E78.5 HYPERLIPIDEMIA, UNSPECIFIED HYPERLIPIDEMIA TYPE: ICD-10-CM

## 2025-07-10 RX ORDER — LEVOTHYROXINE SODIUM 88 UG/1
88 TABLET ORAL DAILY
Qty: 30 TABLET | Refills: 5 | Status: SHIPPED | OUTPATIENT
Start: 2025-07-10

## 2025-07-10 RX ORDER — PRAVASTATIN SODIUM 20 MG
20 TABLET ORAL DAILY
Qty: 30 TABLET | Refills: 5 | Status: SHIPPED | OUTPATIENT
Start: 2025-07-10 | End: 2025-07-14 | Stop reason: SDUPTHER

## 2025-07-14 DIAGNOSIS — E78.5 HYPERLIPIDEMIA, UNSPECIFIED HYPERLIPIDEMIA TYPE: ICD-10-CM

## 2025-07-14 RX ORDER — PRAVASTATIN SODIUM 20 MG
20 TABLET ORAL DAILY
Qty: 90 TABLET | Refills: 1 | Status: SHIPPED | OUTPATIENT
Start: 2025-07-14

## 2025-07-15 ENCOUNTER — NURSE TRIAGE (OUTPATIENT)
Age: 70
End: 2025-07-15

## 2025-07-15 DIAGNOSIS — R39.9 UTI SYMPTOMS: Primary | ICD-10-CM

## 2025-07-17 ENCOUNTER — APPOINTMENT (OUTPATIENT)
Dept: LAB | Facility: CLINIC | Age: 70
End: 2025-07-17
Attending: PHYSICIAN ASSISTANT
Payer: MEDICARE

## 2025-07-17 LAB
BACTERIA UR QL AUTO: ABNORMAL /HPF
BILIRUB UR QL STRIP: NEGATIVE
CLARITY UR: CLEAR
COLOR UR: ABNORMAL
GLUCOSE UR STRIP-MCNC: NEGATIVE MG/DL
HGB UR QL STRIP.AUTO: NEGATIVE
KETONES UR STRIP-MCNC: NEGATIVE MG/DL
LEUKOCYTE ESTERASE UR QL STRIP: ABNORMAL
NITRITE UR QL STRIP: NEGATIVE
NON-SQ EPI CELLS URNS QL MICRO: ABNORMAL /HPF
PH UR STRIP.AUTO: 7 [PH]
PROT UR STRIP-MCNC: NEGATIVE MG/DL
RBC #/AREA URNS AUTO: ABNORMAL /HPF
SP GR UR STRIP.AUTO: 1.01 (ref 1–1.03)
UROBILINOGEN UR STRIP-ACNC: <2 MG/DL
WBC #/AREA URNS AUTO: ABNORMAL /HPF

## 2025-07-17 PROCEDURE — 87086 URINE CULTURE/COLONY COUNT: CPT | Performed by: PHYSICIAN ASSISTANT

## 2025-07-17 PROCEDURE — 87186 SC STD MICRODIL/AGAR DIL: CPT | Performed by: PHYSICIAN ASSISTANT

## 2025-07-17 PROCEDURE — 81001 URINALYSIS AUTO W/SCOPE: CPT | Performed by: PHYSICIAN ASSISTANT

## 2025-07-17 PROCEDURE — 87077 CULTURE AEROBIC IDENTIFY: CPT | Performed by: PHYSICIAN ASSISTANT

## 2025-07-19 LAB
BACTERIA UR CULT: ABNORMAL
BACTERIA UR CULT: ABNORMAL

## 2025-08-04 DIAGNOSIS — I10 ESSENTIAL HYPERTENSION: ICD-10-CM

## 2025-08-06 RX ORDER — LOSARTAN POTASSIUM 100 MG/1
100 TABLET ORAL DAILY
Qty: 90 TABLET | Refills: 1 | Status: SHIPPED | OUTPATIENT
Start: 2025-08-06

## (undated) DEVICE — 2, DISPOSABLE SUCTION/IRRIGATOR WITHOUT DISPOSABLE TIP: Brand: STRYKEFLOW

## (undated) DEVICE — TROCAR: Brand: KII FIOS FIRST ENTRY

## (undated) DEVICE — LIGAMAX 5 MM ENDOSCOPIC MULTIPLE CLIP APPLIER: Brand: LIGAMAX

## (undated) DEVICE — 3M™ STERI-STRIP™ REINFORCED ADHESIVE SKIN CLOSURES, R1547, 1/2 IN X 4 IN (12 MM X 100 MM), 6 STRIPS/ENVELOPE: Brand: 3M™ STERI-STRIP™

## (undated) DEVICE — ENDOPOUCH RETRIEVER SPECIMEN RETRIEVAL BAGS: Brand: ENDOPOUCH RETRIEVER

## (undated) DEVICE — SCD SEQUENTIAL COMPRESSION COMFORT SLEEVE MEDIUM KNEE LENGTH: Brand: KENDALL SCD

## (undated) DEVICE — TOWEL SURG XR DETECT GREEN STRL RFD

## (undated) DEVICE — SPECIMEN CONTAINER STERILE PEEL PACK

## (undated) DEVICE — TROCAR SITE CLOSURE DEVICE: Brand: ENDO CLOSE

## (undated) DEVICE — NEEDLE HYPO 23G X 1-1/2 IN

## (undated) DEVICE — SUT VICRYL 0 UR-6 27 IN J603H

## (undated) DEVICE — GAUZE SPONGES,16 PLY: Brand: CURITY

## (undated) DEVICE — CHLORAPREP HI-LITE 26ML ORANGE

## (undated) DEVICE — DISPOSABLE OR TOWEL: Brand: CARDINAL HEALTH

## (undated) DEVICE — THIS PRODUCT IS SINGLE USE AND INTENDED TO BE USED FOR BLUNT DISSECTION OF TISSUE.: Brand: ASPEN® ENDOSCOPIC KITTNER, SINGLE TIP

## (undated) DEVICE — SYRINGE 10ML LL

## (undated) DEVICE — TROCAR: Brand: KII SLEEVE

## (undated) DEVICE — 3M™ STERI-STRIP™ COMPOUND BENZOIN TINCTURE 40 BAGS/CARTON 4 CARTONS/CASE C1544: Brand: 3M™ STERI-STRIP™

## (undated) DEVICE — SUT MONOCRYL 4-0 PS-2 18 IN Y496G

## (undated) DEVICE — INTENDED FOR TISSUE SEPARATION, AND OTHER PROCEDURES THAT REQUIRE A SHARP SURGICAL BLADE TO PUNCTURE OR CUT.: Brand: BARD-PARKER SAFETY BLADES SIZE 15, STERILE

## (undated) DEVICE — GLOVE PI ULTRA TOUCH SZ 6

## (undated) DEVICE — PENCIL ELECTROSURG E-Z CLEAN -0035H